# Patient Record
Sex: MALE | Race: WHITE | NOT HISPANIC OR LATINO | Employment: FULL TIME | URBAN - METROPOLITAN AREA
[De-identification: names, ages, dates, MRNs, and addresses within clinical notes are randomized per-mention and may not be internally consistent; named-entity substitution may affect disease eponyms.]

---

## 2017-03-08 ENCOUNTER — ALLSCRIPTS OFFICE VISIT (OUTPATIENT)
Dept: OTHER | Facility: OTHER | Age: 57
End: 2017-03-08

## 2017-03-08 DIAGNOSIS — E78.5 HYPERLIPIDEMIA: ICD-10-CM

## 2017-07-18 LAB
A/G RATIO (HISTORICAL): 2.1 (CALC) (ref 1–2.5)
ALBUMIN SERPL BCP-MCNC: 4.4 G/DL (ref 3.6–5.1)
ALP SERPL-CCNC: 85 U/L (ref 40–115)
ALT SERPL W P-5'-P-CCNC: 17 U/L (ref 9–46)
AST SERPL W P-5'-P-CCNC: 20 U/L (ref 10–35)
BILIRUB SERPL-MCNC: 0.7 MG/DL (ref 0.2–1.2)
BILIRUBIN DIRECT (HISTORICAL): 0.1 MG/DL
CHOLEST SERPL-MCNC: 143 MG/DL (ref 125–200)
CHOLEST/HDLC SERPL: 4.5 (CALC)
GAMMA GLOBULIN (HISTORICAL): 2.1 G/DL (CALC) (ref 1.9–3.7)
HDLC SERPL-MCNC: 32 MG/DL
INDIRECT BILIRUBIN (HISTORICAL): 0.6 MG/DL (CALC) (ref 0.2–1.2)
LDL CHOLESTEROL (HISTORICAL): 69 MG/DL (CALC)
NON-HDL-CHOL (CHOL-HDL) (HISTORICAL): 111 MG/DL (CALC)
TOTAL PROTEIN (HISTORICAL): 6.5 G/DL (ref 6.1–8.1)
TRIGL SERPL-MCNC: 212 MG/DL

## 2017-09-06 ENCOUNTER — ALLSCRIPTS OFFICE VISIT (OUTPATIENT)
Dept: OTHER | Facility: OTHER | Age: 57
End: 2017-09-06

## 2018-01-14 VITALS
HEART RATE: 56 BPM | BODY MASS INDEX: 24.91 KG/M2 | HEIGHT: 75 IN | SYSTOLIC BLOOD PRESSURE: 130 MMHG | DIASTOLIC BLOOD PRESSURE: 82 MMHG | WEIGHT: 200.38 LBS

## 2018-01-14 VITALS
HEART RATE: 52 BPM | SYSTOLIC BLOOD PRESSURE: 110 MMHG | BODY MASS INDEX: 24.62 KG/M2 | HEIGHT: 75 IN | WEIGHT: 198 LBS | RESPIRATION RATE: 16 BRPM | DIASTOLIC BLOOD PRESSURE: 68 MMHG

## 2018-03-21 ENCOUNTER — OFFICE VISIT (OUTPATIENT)
Dept: CARDIOLOGY CLINIC | Facility: CLINIC | Age: 58
End: 2018-03-21
Payer: COMMERCIAL

## 2018-03-21 VITALS
BODY MASS INDEX: 24.69 KG/M2 | HEART RATE: 56 BPM | WEIGHT: 198.6 LBS | DIASTOLIC BLOOD PRESSURE: 78 MMHG | SYSTOLIC BLOOD PRESSURE: 124 MMHG | RESPIRATION RATE: 16 BRPM | HEIGHT: 75 IN

## 2018-03-21 DIAGNOSIS — I25.10 CORONARY ARTERY DISEASE INVOLVING NATIVE CORONARY ARTERY OF NATIVE HEART WITHOUT ANGINA PECTORIS: ICD-10-CM

## 2018-03-21 DIAGNOSIS — I25.10 CORONARY ARTERY DISEASE INVOLVING NATIVE HEART WITHOUT ANGINA PECTORIS, UNSPECIFIED VESSEL OR LESION TYPE: Primary | ICD-10-CM

## 2018-03-21 DIAGNOSIS — I10 ESSENTIAL HYPERTENSION: ICD-10-CM

## 2018-03-21 DIAGNOSIS — E78.2 MIXED HYPERLIPIDEMIA: ICD-10-CM

## 2018-03-21 PROCEDURE — 99214 OFFICE O/P EST MOD 30 MIN: CPT | Performed by: INTERNAL MEDICINE

## 2018-03-21 PROCEDURE — 93000 ELECTROCARDIOGRAM COMPLETE: CPT | Performed by: INTERNAL MEDICINE

## 2018-03-21 RX ORDER — ASPIRIN 81 MG/1
81 TABLET ORAL
COMMUNITY

## 2018-03-21 NOTE — PROGRESS NOTES
Cardiology Follow Jona España  1960  [id number]  [facility]    1  Coronary artery disease involving native heart without angina pectoris, unspecified vessel or lesion type  POCT ECG    NM myocardial perfusion spect (rx stress and/or rest)   2  Coronary artery disease involving native coronary artery of native heart without angina pectoris     3  Mixed hyperlipidemia     4  Essential hypertension         Interval History: An episode on [place] Day during which she felt briefly lightheaded associated with anxiety cleared quickly  Since then he has had no issues  He denies chest pain shortness of breath orthopnea paroxysmal nocturnal dyspnea or syncope  There is no problem list on file for this patient  History reviewed  No pertinent past medical history  Social History     Social History    Marital status: /Civil Union     Spouse name: N/A    Number of children: N/A    Years of education: N/A     Occupational History    Not on file  Social History Main Topics    Smoking status: Former Smoker     Quit date: 2012    Smokeless tobacco: Never Used    Alcohol use Not on file    Drug use: Unknown    Sexual activity: Not on file     Other Topics Concern    Not on file     Social History Narrative    No narrative on file      History reviewed  No pertinent family history  History reviewed  No pertinent surgical history      Current Outpatient Prescriptions:     aspirin (ECOTRIN LOW STRENGTH) 81 mg EC tablet, Take 81 mg by mouth, Disp: , Rfl:     atorvastatin (LIPITOR) 40 mg tablet, Take 1 tablet by mouth, Disp: , Rfl:     famotidine (PEPCID) 20 mg tablet, Take 1 tablet by mouth daily, Disp: , Rfl:     metoprolol tartrate (LOPRESSOR) 25 mg tablet, 25 mg every 12 (twelve) hours  , Disp: , Rfl: 0    clindamycin (CLEOCIN) 300 MG capsule, take 1 capsule four times a day until finished, Disp: , Rfl: 0    clopidogrel (PLAVIX) 75 mg tablet, Take 1 tablet by mouth daily, Disp: , Rfl:     diclofenac sodium (VOLTAREN) 1 %, Place on the skin, Disp: , Rfl:     HYDROcodone-acetaminophen (NORCO) 5-325 mg per tablet, take 1 tablet every 4 hours if needed, Disp: , Rfl: 0  Allergies   Allergen Reactions    Penicillins Rash       Labs:  No visits with results within 6 Month(s) from this visit  Latest known visit with results is:   [employer] Office Visit on 03/08/2017   Component Date Value    Total Protein 07/18/2017 6 5     Albumin 07/18/2017 4 4     GAMMA GLOBULIN 07/18/2017 2 1     A/G RATIO 07/18/2017 2 1     Total Bilirubin 07/18/2017 0 7     BILIRUBIN DIRECT 07/18/2017 0 1     INDIRECT BILIRUBIN 07/18/2017 0 6     Alkaline Phosphatase 07/18/2017 85     AST 07/18/2017 20     ALT 07/18/2017 17     Cholesterol 07/18/2017 143     HDL 07/18/2017 32*    Triglycerides 07/18/2017 212*    LDL CHOLESTEROL 07/18/2017 69     Chol/HDL Ratio 07/18/2017 4 5     NON-HDL-CHOL (CHOL-HDL) 07/18/2017 111      Imaging: No results found  Review of Systems:  Review of Systems   Constitutional: Negative for fatigue  HENT: Negative for nosebleeds  Eyes: Negative for redness  Respiratory: Negative for chest tightness and shortness of breath  Cardiovascular: Negative for chest pain, palpitations and leg swelling  Gastrointestinal: Negative for abdominal pain  Endocrine: Negative for polyuria  Genitourinary: Negative for urgency  Musculoskeletal: Negative for arthralgias  Skin: Negative for rash  Neurological: Negative for dizziness and syncope  Psychiatric/Behavioral: Negative for confusion and sleep disturbance  The patient is not nervous/anxious  Physical Exam:  Physical Exam   Constitutional: He is oriented to person, place, and time  He appears well-developed and well-nourished  HENT:   Head: Normocephalic and atraumatic     Nose: Nose normal  Mouth/Throat: Oropharynx is clear and moist    Eyes: Pupils are equal, round, and reactive to light  Neck: Neck supple  Cardiovascular: Normal rate, regular rhythm, normal heart sounds and intact distal pulses  Pulmonary/Chest: Effort normal and breath sounds normal    Abdominal: Soft  Bowel sounds are normal    Musculoskeletal: Normal range of motion  Neurological: He is alert and oriented to person, place, and time  Skin: Skin is warm and dry  Psychiatric: He has a normal mood and affect  His behavior is normal  Judgment and thought content normal        Discussion/Summary:  Episode from [place] Day sounds like he may have had a bit of a panic attack which he has had the past   No further symptoms  We will do nuclear stress test to screen him for ischemia has he has had not had 1 since his coronary stent  Continues on high-dose statin with an LDL of less than 70  He does have a low HDL  Continues on anti-platelet medications  Blood pressure is controlled    I will call with the results of his stress test and if the stress test is not abnormal will see him again in 6 month

## 2018-04-23 ENCOUNTER — HOSPITAL ENCOUNTER (OUTPATIENT)
Dept: NON INVASIVE DIAGNOSTICS | Facility: HOSPITAL | Age: 58
Discharge: HOME | End: 2018-04-23
Attending: INTERNAL MEDICINE

## 2018-04-23 ENCOUNTER — HOSPITAL ENCOUNTER (OUTPATIENT)
Dept: NUCLEAR MEDICINE | Facility: HOSPITAL | Age: 58
Discharge: HOME | End: 2018-04-23
Attending: INTERNAL MEDICINE
Payer: COMMERCIAL

## 2018-04-23 DIAGNOSIS — I25.10 CORONARY ARTERY DISEASE INVOLVING NATIVE HEART WITHOUT ANGINA PECTORIS, UNSPECIFIED VESSEL OR LESION TYPE: ICD-10-CM

## 2018-04-23 PROCEDURE — 93017 CV STRESS TEST TRACING ONLY: CPT

## 2018-04-23 PROCEDURE — A9502 TC99M TETROFOSMIN: HCPCS

## 2018-04-23 PROCEDURE — 78452 HT MUSCLE IMAGE SPECT MULT: CPT

## 2018-04-24 LAB
CHEST PAIN STATEMENT: NORMAL
MAX DIASTOLIC BP: 135 MMHG
MAX HEART RATE: 139 BPM
MAX PREDICTED HEART RATE: 162 BPM
MAX. SYSTOLIC BP: 221 MMHG
PROTOCOL NAME: NORMAL
TARGET HR FORMULA: NORMAL
TIME IN EXERCISE PHASE: NORMAL

## 2018-06-21 ENCOUNTER — OFFICE VISIT (OUTPATIENT)
Dept: INTERNAL MEDICINE CLINIC | Facility: CLINIC | Age: 58
End: 2018-06-21
Payer: COMMERCIAL

## 2018-06-21 VITALS
TEMPERATURE: 98.7 F | BODY MASS INDEX: 23.13 KG/M2 | HEART RATE: 56 BPM | DIASTOLIC BLOOD PRESSURE: 80 MMHG | HEIGHT: 75 IN | RESPIRATION RATE: 12 BRPM | WEIGHT: 186 LBS | SYSTOLIC BLOOD PRESSURE: 122 MMHG

## 2018-06-21 DIAGNOSIS — K21.9 GASTROESOPHAGEAL REFLUX DISEASE WITHOUT ESOPHAGITIS: ICD-10-CM

## 2018-06-21 DIAGNOSIS — I10 ESSENTIAL HYPERTENSION: Primary | ICD-10-CM

## 2018-06-21 DIAGNOSIS — E78.5 HYPERLIPIDEMIA, UNSPECIFIED HYPERLIPIDEMIA TYPE: ICD-10-CM

## 2018-06-21 DIAGNOSIS — Z11.59 NEED FOR HEPATITIS C SCREENING TEST: ICD-10-CM

## 2018-06-21 DIAGNOSIS — I25.10 CORONARY ARTERY DISEASE INVOLVING NATIVE CORONARY ARTERY OF NATIVE HEART WITHOUT ANGINA PECTORIS: ICD-10-CM

## 2018-06-21 DIAGNOSIS — J30.9 ALLERGIC RHINITIS, UNSPECIFIED SEASONALITY, UNSPECIFIED TRIGGER: ICD-10-CM

## 2018-06-21 PROCEDURE — 99204 OFFICE O/P NEW MOD 45 MIN: CPT | Performed by: INTERNAL MEDICINE

## 2018-06-21 PROCEDURE — 1036F TOBACCO NON-USER: CPT | Performed by: INTERNAL MEDICINE

## 2018-06-21 PROCEDURE — 3079F DIAST BP 80-89 MM HG: CPT | Performed by: INTERNAL MEDICINE

## 2018-06-21 PROCEDURE — 3008F BODY MASS INDEX DOCD: CPT | Performed by: INTERNAL MEDICINE

## 2018-06-21 PROCEDURE — 3074F SYST BP LT 130 MM HG: CPT | Performed by: INTERNAL MEDICINE

## 2018-06-22 NOTE — PROGRESS NOTES
[facility] [place]      NAME: Coral Calix  AGE: 61 y o  SEX: male  : 1960   MRN: [id number]    DATE: 2018  TIME: 10:59 AM    Assessment and Plan   1  Essential hypertension  Well controlled  - CBC  - Comprehensive metabolic panel    2  Coronary artery disease involving native coronary artery of native heart without angina pectoris  No angina  On antiplatelet therapy and appropriate risk factor modification  3  Hyperlipidemia, unspecified hyperlipidemia type  On atorvastatin  - Lipid panel    4  Allergic rhinitis, unspecified seasonality, unspecified trigger  Continue over-the-counter antihistamines  5  Gastroesophageal reflux disease without esophagitis  Stable on famotidine  6  Need for hepatitis C screening test  - Hepatitis C Qualitative by PCR    The patient appears to be doing pretty well  He is tolerating his medications  He is maintaining a healthy lifestyle  He sees Dr Elia Cardoso every 6 months for follow-up of his coronary disease  In light of this, I suggested that he return here yearly or on an as-needed basis  Updated lab work was requested  The patient will be screened for hepatitis-C  He had a colonoscopy recently  Return to office in:  One year  Chief Complaint     Chief Complaint   Patient presents with   [address]     colonoscopy 2014       History of Present Illness     The patient is a 53-year-old man who came to the office to establish a new primary care relationship  Of late, he has been feeling generally well  Over the past several weeks he has had a lot of head congestion related to allergies  He has been using some over-the-counter antihistamines with some improvement  Otherwise, he has been feeling well with no chest pain, shortness of breath, palpitations, or dizziness  The patient's past medical history is positive for hypertension  He has a history of coronary artery disease and had a stent placed in     Things have been going well since then  He has no history of congestive heart failure, cardiac arrhythmia, stroke, COPD, type 2 diabetes, etc     The patient's only surgery was an appendectomy  Family history is positive for heart disease in both his father and mother  Social history reveals the patient is  and lives with his wife  He does not smoke nor has he ever smoked  He drinks an occasional glass of beer  He uses no illicit drugs  Review of Systems   Review of Systems   Constitutional: Negative  HENT: Positive for rhinorrhea and sinus pressure  Negative for congestion, ear pain, postnasal drip, sore throat and trouble swallowing  Eyes: Negative for pain, discharge, redness and visual disturbance  Respiratory: Negative for cough, shortness of breath and wheezing  Cardiovascular: Negative  Gastrointestinal: Negative  Endocrine: Negative  Genitourinary: Negative for difficulty urinating, dysuria, frequency, hematuria and urgency  Musculoskeletal: Negative for arthralgias, gait problem, joint swelling and myalgias  Skin: Negative for rash  Neurological: Negative for dizziness, speech difficulty, weakness, light-headedness, numbness and headaches  Hematological: Negative  Psychiatric/Behavioral: Negative for confusion, decreased concentration, dysphoric mood and sleep disturbance  The patient is not nervous/anxious  Active Problem List     Patient Active Problem List   Diagnosis    CAD (coronary artery disease), native coronary artery    Hyperlipidemia    Essential hypertension    Esophageal reflux    Osteoarthritis    Allergic rhinitis       Objective   /80 (BP Location: Left arm, Patient Position: Sitting, Cuff Size: Standard)   Pulse 56   Temp 98 7 °F (37 1 °C) (Tympanic)   Resp 12   Ht 6' 3" (1 905 m)   Wt 84 4 kg (186 lb)   BMI 23 25 kg/m²     Physical Exam   Constitutional: He is oriented to person, place, and time   He appears well-developed and well-nourished  No distress  HENT:   Head: Normocephalic and atraumatic  Right Ear: External ear normal    Left Ear: External ear normal    Mouth/Throat: Oropharynx is clear and moist    The nasal septum was deviated toward the right  Neck: Neck supple  No JVD present  No tracheal deviation present  No thyromegaly present  Cardiovascular: Normal rate, regular rhythm, normal heart sounds and intact distal pulses  Exam reveals no gallop and no friction rub  No murmur heard  Pulmonary/Chest: Effort normal and breath sounds normal  He has no wheezes  He has no rales  He exhibits no tenderness  Abdominal: Soft  Bowel sounds are normal  He exhibits no distension and no mass  There is no tenderness  There is no rebound  Musculoskeletal: Normal range of motion  He exhibits no edema or tenderness  Lymphadenopathy:     He has no cervical adenopathy  Neurological: He is alert and oriented to person, place, and time  He displays normal reflexes  No cranial nerve deficit  He exhibits normal muscle tone  Coordination normal    Skin: Skin is warm and dry  Psychiatric: He has a normal mood and affect  His behavior is normal  Judgment and thought content normal        Pertinent Laboratory/Diagnostic Studies:  Hospital Outpatient Visit on 04/23/2018   Component Date Value Ref Range Status    Protocol Name 04/23/2018 [facility]   Final    Time In Exercise Phase 04/23/2018 00:12:00   Final    MAX   SYSTOLIC BP 06/38/4475 415  mmHg Final    Max Diastolic Bp 24/82/4880 271  mmHg Final    Max Heart Rate 04/23/2018 139  BPM Final    Max Predicted Heart Rate 04/23/2018 162  BPM Final    Reason for Termination 04/23/2018    Final                    Value:Fatigue  Dyspnea  Target Heart Rate Achieved      Test Indication 04/23/2018    Final                    Value:Lightheadedness  Screening for CAD      Target Hr Formular 04/23/2018 (220 - Age)*85%   Final    Arrhy During Ex 04/23/2018    Final Value:atrial premature beats  ventricular premature beats-isolated      Chest Pain Statement 04/23/2018 none   Final           Current Medications     Current Outpatient Prescriptions:     aspirin (ECOTRIN LOW STRENGTH) 81 mg EC tablet, Take 81 mg by mouth, Disp: , Rfl:     atorvastatin (LIPITOR) 40 mg tablet, Take 1 tablet by mouth, Disp: , Rfl:     metoprolol tartrate (LOPRESSOR) 25 mg tablet, 25 mg every 12 (twelve) hours  , Disp: , Rfl: 0    famotidine (PEPCID) 20 mg tablet, Take 1 tablet by mouth daily, Disp: , Rfl:       Marcello Garay MD

## 2018-09-29 DIAGNOSIS — E78.00 HYPERCHOLESTEREMIA: Primary | ICD-10-CM

## 2018-10-01 RX ORDER — ATORVASTATIN CALCIUM 40 MG/1
TABLET, FILM COATED ORAL
Qty: 90 TABLET | Refills: 3 | Status: SHIPPED | OUTPATIENT
Start: 2018-10-01 | End: 2019-12-18 | Stop reason: SDUPTHER

## 2018-12-29 DIAGNOSIS — I10 HTN (HYPERTENSION), BENIGN: Primary | ICD-10-CM

## 2018-12-29 DIAGNOSIS — I25.10 CORONARY ARTERY DISEASE INVOLVING NATIVE CORONARY ARTERY OF NATIVE HEART WITHOUT ANGINA PECTORIS: ICD-10-CM

## 2018-12-29 NOTE — PROGRESS NOTES
Patient's wife called, requesting refill for metoprolol  Patient's wife states patient has not been taking for 1-2 weeks as he ran out of refills and has not been able to get in touch with anyone in the office  Patient feeling well without symptoms  She is wondering if he needs this long-term  I have advised she discuss this with their primary cardiologist   She is agreeable

## 2018-12-31 NOTE — PROGRESS NOTES
Phone call to patient Monday morning asking patient what phone number he was calling to get a refill on his Metoprolol  He states he never called our office, that Jose sheldon was supposed to, and didn't  He states" it was [employer] at fault " I explained we have a prescription line that gets checked multiple times daily, and that he should use that line for any refills he needs, instead of depending on the drug store to take care of everything  I think they probably used the [phone] new fax and it got lost in the big black hole that everything else does, and we never received the fax yet  Patient agreed to use our prescription line in the future

## 2019-04-11 DIAGNOSIS — I10 HTN (HYPERTENSION), BENIGN: ICD-10-CM

## 2019-04-11 DIAGNOSIS — I25.10 CORONARY ARTERY DISEASE INVOLVING NATIVE CORONARY ARTERY OF NATIVE HEART WITHOUT ANGINA PECTORIS: ICD-10-CM

## 2019-10-28 DIAGNOSIS — E78.2 MIXED HYPERLIPIDEMIA: ICD-10-CM

## 2019-10-28 DIAGNOSIS — I25.10 CORONARY ARTERY DISEASE INVOLVING NATIVE HEART WITHOUT ANGINA PECTORIS, UNSPECIFIED VESSEL OR LESION TYPE: Primary | ICD-10-CM

## 2019-12-18 ENCOUNTER — OFFICE VISIT (OUTPATIENT)
Dept: CARDIOLOGY CLINIC | Facility: CLINIC | Age: 59
End: 2019-12-18
Payer: COMMERCIAL

## 2019-12-18 VITALS
SYSTOLIC BLOOD PRESSURE: 124 MMHG | DIASTOLIC BLOOD PRESSURE: 72 MMHG | HEART RATE: 50 BPM | HEIGHT: 75 IN | WEIGHT: 190.4 LBS | BODY MASS INDEX: 23.67 KG/M2

## 2019-12-18 DIAGNOSIS — I10 HTN (HYPERTENSION), BENIGN: ICD-10-CM

## 2019-12-18 DIAGNOSIS — I25.10 CORONARY ARTERY DISEASE INVOLVING NATIVE HEART, ANGINA PRESENCE UNSPECIFIED, UNSPECIFIED VESSEL OR LESION TYPE: Primary | ICD-10-CM

## 2019-12-18 DIAGNOSIS — I10 ESSENTIAL HYPERTENSION: ICD-10-CM

## 2019-12-18 DIAGNOSIS — E78.2 MIXED HYPERLIPIDEMIA: ICD-10-CM

## 2019-12-18 DIAGNOSIS — I25.10 CORONARY ARTERY DISEASE INVOLVING NATIVE CORONARY ARTERY OF NATIVE HEART WITHOUT ANGINA PECTORIS: ICD-10-CM

## 2019-12-18 DIAGNOSIS — E78.00 HYPERCHOLESTEREMIA: ICD-10-CM

## 2019-12-18 PROCEDURE — 99214 OFFICE O/P EST MOD 30 MIN: CPT | Performed by: INTERNAL MEDICINE

## 2019-12-18 PROCEDURE — 3078F DIAST BP <80 MM HG: CPT | Performed by: INTERNAL MEDICINE

## 2019-12-18 PROCEDURE — 3074F SYST BP LT 130 MM HG: CPT | Performed by: INTERNAL MEDICINE

## 2019-12-18 PROCEDURE — 93000 ELECTROCARDIOGRAM COMPLETE: CPT | Performed by: INTERNAL MEDICINE

## 2019-12-18 RX ORDER — ATORVASTATIN CALCIUM 40 MG/1
40 TABLET, FILM COATED ORAL
Qty: 90 TABLET | Refills: 3 | Status: SHIPPED | OUTPATIENT
Start: 2019-12-18 | End: 2021-06-17 | Stop reason: SDUPTHER

## 2019-12-18 NOTE — PROGRESS NOTES
Cardiology Follow Up    Zina Mullins  1960  [id number]  [address] [facility] [id number] [phone]    1  Coronary artery disease involving native heart, angina presence unspecified, unspecified vessel or lesion type  POCT ECG    Basic metabolic panel    Hepatic function panel    Lipid panel    CBC and differential   2  Coronary artery disease involving native coronary artery of native heart without angina pectoris     3  Essential hypertension     4  Mixed hyperlipidemia         Interval History:  No complaints  Tolerates all activity  Denies chest pain shortness of breath orthopnea paroxysmal nocturnal dyspnea or syncope  Patient Active Problem List   Diagnosis    CAD (coronary artery disease), native coronary artery    Hyperlipidemia    Essential hypertension    Esophageal reflux    Osteoarthritis    Allergic rhinitis     History reviewed  No pertinent past medical history    Social History     Socioeconomic History    Marital status: /Civil Union     Spouse name: Not on file    Number of children: Not on file    Years of education: Not on file    Highest education level: Not on file   Occupational History    Not on file   Social Needs    Financial resource strain: Not on file    Food insecurity:     Worry: Not on file     Inability: Not on file    Transportation needs:     Medical: Not on file     Non-medical: Not on file   Tobacco Use    Smoking status: Former Smoker     Last attempt to quit: 2012     Years since quittin 9    Smokeless tobacco: Never Used   Substance and Sexual Activity    Alcohol use: Not on file    Drug use: Not on file    Sexual activity: Not on file   Lifestyle    Physical activity:     Days per week: Not on file     Minutes per session: Not on file    Stress: Not on file   Relationships    Social connections:     Talks on phone: Not on file     Gets together: Not on file     Attends [employer] service: Not on file     Active member of club or organization: Not on file     Attends meetings of clubs or organizations: Not on file     Relationship status: Not on file    Intimate partner violence:     Fear of current or ex partner: Not on file     Emotionally abused: Not on file     Physically abused: Not on file     Forced sexual activity: Not on file   Other Topics Concern    Not on file   Social History Narrative    Not on file      Family History   Problem Relation Age of Onset    Heart disease Mother     Heart disease Father      Past Surgical History:   Procedure Laterality Date    APPENDECTOMY      CORONARY ANGIOPLASTY WITH STENT PLACEMENT      HERNIA REPAIR         Current Outpatient Medications:     aspirin (ECOTRIN LOW STRENGTH) 81 mg EC tablet, Take 81 mg by mouth, Disp: , Rfl:     atorvastatin (LIPITOR) 40 mg tablet, TAKE 1 TABLET AT BEDTIME , Disp: 90 tablet, Rfl: 3    metoprolol tartrate (LOPRESSOR) 25 mg tablet, Take 1 tablet (25 mg total) by mouth every 12 (twelve) hours, Disp: 60 tablet, Rfl: 5    famotidine (PEPCID) 20 mg tablet, Take 1 tablet by mouth daily, Disp: , Rfl:   Allergies   Allergen Reactions    Penicillins Rash       Labs:  No visits with results within 2 Month(s) from this visit  Latest known visit with results is:   Hospital Outpatient Visit on 04/23/2018   Component Date Value    Protocol Name 04/23/2018 Trini Diego Time In Exercise Phase 04/23/2018 00:12:00     MAX   SYSTOLIC BP 63/58/3466 004     Max Diastolic Bp 49/39/3922 743     Max Heart Rate 04/23/2018 139     Max Predicted Heart Rate 04/23/2018 162     Reason for Termination 04/23/2018                      Value:Fatigue  Dyspnea  Target Heart Rate Achieved      Test Indication 04/23/2018                      Value:Lightheadedness  Screening for CAD      Target Hr Formular 04/23/2018 (220 - Age)*85%     Arrhy During Ex 04/23/2018 Value:atrial premature beats  ventricular premature beats-isolated      Chest Pain Statement 04/23/2018 none      Imaging: No results found  Review of Systems:  Review of Systems   Constitutional: Negative for fatigue  HENT: Negative for nosebleeds  Eyes: Negative for redness  Respiratory: Negative for chest tightness and shortness of breath  Cardiovascular: Negative for chest pain, palpitations and leg swelling  Gastrointestinal: Negative for abdominal pain  Endocrine: Negative for polyuria  Genitourinary: Negative for urgency  Musculoskeletal: Negative for arthralgias  Skin: Negative for rash  Neurological: Negative for dizziness and syncope  Psychiatric/Behavioral: Negative for confusion and sleep disturbance  The patient is not nervous/anxious  Physical Exam:  Physical Exam   Constitutional: He is oriented to person, place, and time  He appears well-developed and well-nourished  HENT:   Head: Normocephalic and atraumatic  Nose: Nose normal    Mouth/Throat: Oropharynx is clear and moist    Eyes: Pupils are equal, round, and reactive to light  Neck: Neck supple  Cardiovascular: Normal rate, regular rhythm, normal heart sounds and intact distal pulses  Pulmonary/Chest: Effort normal and breath sounds normal    Abdominal: Soft  Bowel sounds are normal    Musculoskeletal: Normal range of motion  Neurological: He is alert and oriented to person, place, and time  Skin: Skin is warm and dry  Psychiatric: He has a normal mood and affect  His behavior is normal  Judgment and thought content normal        Discussion/Summary:  History of a coronary stent in 2012  He quit smoking at that time  No cardiovascular issues since  I have not seen him since March of 2018  In April of 2018 he had a negative stress test   He is currently asymptomatic in functional class 1  EKG shows sinus bradycardia 50 otherwise unremarkable    Blood pressure is normal   He has not had any recent lab work and I will order that specifically to assess his cholesterol  I will renew his medications  I will see him again in 6 months

## 2020-01-13 PROCEDURE — 88305 TISSUE EXAM BY PATHOLOGIST: CPT | Performed by: PATHOLOGY

## 2020-01-14 ENCOUNTER — LAB REQUISITION (OUTPATIENT)
Dept: LAB | Facility: HOSPITAL | Age: 60
End: 2020-01-14
Payer: COMMERCIAL

## 2020-01-14 DIAGNOSIS — K63.5 POLYP OF COLON: ICD-10-CM

## 2020-01-14 DIAGNOSIS — Z12.11 ENCOUNTER FOR SCREENING FOR MALIGNANT NEOPLASM OF COLON: ICD-10-CM

## 2020-05-21 ENCOUNTER — APPOINTMENT (OUTPATIENT)
Dept: LAB | Facility: CLINIC | Age: 60
End: 2020-05-21
Payer: COMMERCIAL

## 2020-05-21 LAB
ALBUMIN SERPL BCP-MCNC: 3.9 G/DL (ref 3.5–5)
ALP SERPL-CCNC: 73 U/L (ref 46–116)
ALT SERPL W P-5'-P-CCNC: 32 U/L (ref 12–78)
ANION GAP SERPL CALCULATED.3IONS-SCNC: 4 MMOL/L (ref 4–13)
AST SERPL W P-5'-P-CCNC: 18 U/L (ref 5–45)
BASOPHILS # BLD AUTO: 0.04 THOUSANDS/ΜL (ref 0–0.1)
BASOPHILS NFR BLD AUTO: 1 % (ref 0–1)
BILIRUB DIRECT SERPL-MCNC: 0.12 MG/DL (ref 0–0.2)
BILIRUB SERPL-MCNC: 0.44 MG/DL (ref 0.2–1)
BUN SERPL-MCNC: 13 MG/DL (ref 5–25)
CALCIUM SERPL-MCNC: 9.6 MG/DL (ref 8.3–10.1)
CHLORIDE SERPL-SCNC: 109 MMOL/L (ref 100–108)
CHOLEST SERPL-MCNC: 119 MG/DL (ref 50–200)
CO2 SERPL-SCNC: 28 MMOL/L (ref 21–32)
CREAT SERPL-MCNC: 1.02 MG/DL (ref 0.6–1.3)
EOSINOPHIL # BLD AUTO: 0.15 THOUSAND/ΜL (ref 0–0.61)
EOSINOPHIL NFR BLD AUTO: 3 % (ref 0–6)
ERYTHROCYTE [DISTWIDTH] IN BLOOD BY AUTOMATED COUNT: 12.4 % (ref 11.6–15.1)
GFR SERPL CREATININE-BSD FRML MDRD: 80 ML/MIN/1.73SQ M
GLUCOSE P FAST SERPL-MCNC: 84 MG/DL (ref 65–99)
HCT VFR BLD AUTO: 43.1 % (ref 36.5–49.3)
HDLC SERPL-MCNC: 33 MG/DL
HGB BLD-MCNC: 15 G/DL (ref 12–17)
IMM GRANULOCYTES # BLD AUTO: 0.02 THOUSAND/UL (ref 0–0.2)
IMM GRANULOCYTES NFR BLD AUTO: 0 % (ref 0–2)
LDLC SERPL CALC-MCNC: 65 MG/DL (ref 0–100)
LYMPHOCYTES # BLD AUTO: 1.91 THOUSANDS/ΜL (ref 0.6–4.47)
LYMPHOCYTES NFR BLD AUTO: 31 % (ref 14–44)
MCH RBC QN AUTO: 32.3 PG (ref 26.8–34.3)
MCHC RBC AUTO-ENTMCNC: 34.8 G/DL (ref 31.4–37.4)
MCV RBC AUTO: 93 FL (ref 82–98)
MONOCYTES # BLD AUTO: 0.61 THOUSAND/ΜL (ref 0.17–1.22)
MONOCYTES NFR BLD AUTO: 10 % (ref 4–12)
NEUTROPHILS # BLD AUTO: 3.37 THOUSANDS/ΜL (ref 1.85–7.62)
NEUTS SEG NFR BLD AUTO: 55 % (ref 43–75)
NONHDLC SERPL-MCNC: 86 MG/DL
NRBC BLD AUTO-RTO: 0 /100 WBCS
PLATELET # BLD AUTO: 261 THOUSANDS/UL (ref 149–390)
PMV BLD AUTO: 10.5 FL (ref 8.9–12.7)
POTASSIUM SERPL-SCNC: 4 MMOL/L (ref 3.5–5.3)
PROT SERPL-MCNC: 7.1 G/DL (ref 6.4–8.2)
RBC # BLD AUTO: 4.65 MILLION/UL (ref 3.88–5.62)
SODIUM SERPL-SCNC: 141 MMOL/L (ref 136–145)
TRIGL SERPL-MCNC: 105 MG/DL
WBC # BLD AUTO: 6.1 THOUSAND/UL (ref 4.31–10.16)

## 2020-05-21 PROCEDURE — 36415 COLL VENOUS BLD VENIPUNCTURE: CPT | Performed by: INTERNAL MEDICINE

## 2020-05-21 PROCEDURE — 80053 COMPREHEN METABOLIC PANEL: CPT | Performed by: INTERNAL MEDICINE

## 2020-05-21 PROCEDURE — 85025 COMPLETE CBC W/AUTO DIFF WBC: CPT

## 2020-05-21 PROCEDURE — 80061 LIPID PANEL: CPT | Performed by: INTERNAL MEDICINE

## 2020-05-21 PROCEDURE — 82248 BILIRUBIN DIRECT: CPT | Performed by: INTERNAL MEDICINE

## 2021-03-30 DIAGNOSIS — Z23 ENCOUNTER FOR IMMUNIZATION: ICD-10-CM

## 2021-04-15 ENCOUNTER — IMMUNIZATIONS (OUTPATIENT)
Dept: FAMILY MEDICINE CLINIC | Facility: HOSPITAL | Age: 61
End: 2021-04-15

## 2021-04-15 DIAGNOSIS — Z23 ENCOUNTER FOR IMMUNIZATION: Primary | ICD-10-CM

## 2021-04-15 PROCEDURE — 91301 SARS-COV-2 / COVID-19 MRNA VACCINE (MODERNA) 100 MCG: CPT

## 2021-04-15 PROCEDURE — 0011A SARS-COV-2 / COVID-19 MRNA VACCINE (MODERNA) 100 MCG: CPT

## 2021-05-19 ENCOUNTER — IMMUNIZATIONS (OUTPATIENT)
Dept: FAMILY MEDICINE CLINIC | Facility: HOSPITAL | Age: 61
End: 2021-05-19

## 2021-05-19 DIAGNOSIS — Z23 ENCOUNTER FOR IMMUNIZATION: Primary | ICD-10-CM

## 2021-05-19 PROCEDURE — 0012A SARS-COV-2 / COVID-19 MRNA VACCINE (MODERNA) 100 MCG: CPT

## 2021-05-19 PROCEDURE — 91301 SARS-COV-2 / COVID-19 MRNA VACCINE (MODERNA) 100 MCG: CPT

## 2021-06-02 ENCOUNTER — TELEPHONE (OUTPATIENT)
Dept: CARDIOLOGY CLINIC | Facility: CLINIC | Age: 61
End: 2021-06-02

## 2021-06-02 NOTE — TELEPHONE ENCOUNTER
Wife called upset found out that patient has been out of medication since December called for refills and were refused because has not been here since 12/2019 / his appt has been rescheduled due to MD schedule  delaying the rx refill, pt was last seen 12/2019  pt is seeing Dr Feliz Cedeno sooner than Dr Angela Gray now rescheduled to July with Dr Angela Gray  Advised wife that if Dr Feliz Cedeno examines and feels patient should be on medications he can prescribe the same  Will put message into Dr Angela Gray so he is aware of situation

## 2021-06-17 ENCOUNTER — OFFICE VISIT (OUTPATIENT)
Dept: INTERNAL MEDICINE CLINIC | Facility: CLINIC | Age: 61
End: 2021-06-17
Payer: COMMERCIAL

## 2021-06-17 VITALS
RESPIRATION RATE: 14 BRPM | SYSTOLIC BLOOD PRESSURE: 130 MMHG | WEIGHT: 173.8 LBS | TEMPERATURE: 97.4 F | DIASTOLIC BLOOD PRESSURE: 80 MMHG | HEIGHT: 75 IN | BODY MASS INDEX: 21.61 KG/M2 | HEART RATE: 70 BPM

## 2021-06-17 DIAGNOSIS — Z12.5 SCREENING FOR PROSTATE CANCER: ICD-10-CM

## 2021-06-17 DIAGNOSIS — E78.00 HYPERCHOLESTEREMIA: ICD-10-CM

## 2021-06-17 DIAGNOSIS — E78.2 MIXED HYPERLIPIDEMIA: ICD-10-CM

## 2021-06-17 DIAGNOSIS — K21.9 GASTROESOPHAGEAL REFLUX DISEASE WITHOUT ESOPHAGITIS: ICD-10-CM

## 2021-06-17 DIAGNOSIS — I10 ESSENTIAL HYPERTENSION: ICD-10-CM

## 2021-06-17 DIAGNOSIS — I25.10 CORONARY ARTERY DISEASE INVOLVING NATIVE CORONARY ARTERY OF NATIVE HEART WITHOUT ANGINA PECTORIS: Primary | ICD-10-CM

## 2021-06-17 DIAGNOSIS — Z11.59 NEED FOR HEPATITIS C SCREENING TEST: ICD-10-CM

## 2021-06-17 DIAGNOSIS — Z11.4 SCREENING FOR HIV (HUMAN IMMUNODEFICIENCY VIRUS): ICD-10-CM

## 2021-06-17 PROCEDURE — 3725F SCREEN DEPRESSION PERFORMED: CPT | Performed by: INTERNAL MEDICINE

## 2021-06-17 PROCEDURE — 99396 PREV VISIT EST AGE 40-64: CPT | Performed by: INTERNAL MEDICINE

## 2021-06-17 PROCEDURE — 1036F TOBACCO NON-USER: CPT | Performed by: INTERNAL MEDICINE

## 2021-06-17 PROCEDURE — 3008F BODY MASS INDEX DOCD: CPT | Performed by: INTERNAL MEDICINE

## 2021-06-17 RX ORDER — ATORVASTATIN CALCIUM 40 MG/1
40 TABLET, FILM COATED ORAL
Qty: 90 TABLET | Refills: 3 | Status: SHIPPED | OUTPATIENT
Start: 2021-06-17

## 2021-06-17 NOTE — PROGRESS NOTES
[facility] Internal Medicine [place]      NAME: Adina Fields  AGE: 63 y o  SEX: male  : 1960   MRN: [id number]    DATE: 2021  TIME: 5:04 PM    Assessment and Plan   1  Coronary artery disease involving native coronary artery of native heart without angina pectoris    No angina  On aspirin  I asked the patient to resume atorvastatin  2  Essential hypertension    Adequately controlled without medication   - Comprehensive metabolic panel  - CBC and differential    3  Gastroesophageal reflux disease without esophagitis    This has responded to dietary manipulation  The patient is not on medication at present  4  Hypercholesteremia    Resume  atorvastatin  - atorvastatin (LIPITOR) 40 mg tablet; Take 1 tablet (40 mg total) by mouth daily at bedtime  Dispense: 90 tablet; Refill: 3  - Lipid panel    5  Need for hepatitis C screening test  - Hepatitis C antibody; Future    6  Screening for HIV (human immunodeficiency virus)  - HIV 1/2 Antigen/Antibody (4th Generation) w Reflex [facility]; Future    7  Screening for prostate cancer  - PSA, Total Screen; Future     the patient is doing generally well  He is maintaining a healthy lifestyle  He does not smoke, drink, use illicit drugs  He exercises regularly  He watches his diet and he is at his ideal body weight  He is up-to-date with influenza and COVID vaccination  I discussed tetanus booster and herpes zoster vaccination with him  He is up-to-date with his colonoscopy  I have ordered studies to screen for hepatitis-C, HIV, and prostate cancer  The patient will be re-evaluated by Dr Humberto Saini of Cardiology in the near future        Return to office in:   One year    Chief Complaint     Chief Complaint   Patient presents with    Annual Exam       History of Present Illness      the patient returned to the office for yearly re-evaluation and for reassessment of his medical problems which include coronary artery disease, hypertension, and hypercholesterolemia  Since his last visit he has been feeling quite well  He has had no chest pain, shortness of breath, palpitations, or dizziness  He says that he has been working from home  He has an office on the 2nd floor on lab in his basement  As result he says he walks up and down steps many times per day  This causes him no untoward symptoms  The patient is currently on aspirin but has run out of his other medications  He says that when he was on metoprolol he felt quite sluggish after he took his morning dose and usually would not take it in the afternoon because of the severity of this side effect  The following portions of the patient's history were reviewed and updated as appropriate: allergies, current medications, past family history, past medical history, past social history, past surgical history and problem list     Review of Systems   Review of Systems   Constitutional: Negative  HENT: Negative for congestion, ear pain, postnasal drip, rhinorrhea, sore throat and trouble swallowing  Eyes: Negative for pain, discharge, redness and visual disturbance  Respiratory: Negative for cough, shortness of breath and wheezing  Cardiovascular: Negative  Gastrointestinal: Negative  Endocrine: Negative  Genitourinary: Negative for difficulty urinating, dysuria, frequency, hematuria and urgency  Musculoskeletal: Negative for arthralgias, gait problem, joint swelling and myalgias  Skin: Negative for rash  Neurological: Negative for dizziness, speech difficulty, weakness, light-headedness, numbness and headaches  Hematological: Negative  Psychiatric/Behavioral: Negative for confusion, decreased concentration, dysphoric mood and sleep disturbance  The patient is not nervous/anxious          Active Problem List     Patient Active Problem List   Diagnosis    CAD (coronary artery disease), native coronary artery    Hyperlipidemia    Essential hypertension    Esophageal reflux    Osteoarthritis    Allergic rhinitis       Objective   /80 (BP Location: Left arm, Patient Position: Sitting, Cuff Size: Standard)   Pulse 70   Temp (!) 97 4 °F (36 3 °C)   Resp 14   Ht 6' 3" (1 905 m)   Wt 78 8 kg (173 lb 12 8 oz)   BMI 21 72 kg/m²     Physical Exam  Constitutional:       General: He is not in acute distress  Appearance: Normal appearance  He is well-developed  HENT:      Head: Normocephalic and atraumatic  Right Ear: Tympanic membrane, ear canal and external ear normal       Left Ear: Tympanic membrane, ear canal and external ear normal       Nose: Nose normal       Mouth/Throat:      Mouth: Mucous membranes are moist       Pharynx: Oropharynx is clear  Eyes:      Extraocular Movements: Extraocular movements intact  Conjunctiva/sclera: Conjunctivae normal       Pupils: Pupils are equal, round, and reactive to light  Neck:      Thyroid: No thyromegaly  Vascular: No JVD  Trachea: No tracheal deviation  Cardiovascular:      Rate and Rhythm: Normal rate and regular rhythm  Heart sounds: Normal heart sounds  No murmur heard  No friction rub  No gallop  Pulmonary:      Effort: Pulmonary effort is normal       Breath sounds: Normal breath sounds  No wheezing or rales  Chest:      Chest wall: No tenderness  Abdominal:      General: Bowel sounds are normal  There is no distension  Palpations: Abdomen is soft  There is no mass  Tenderness: There is no abdominal tenderness  There is no rebound  Musculoskeletal:         General: No tenderness  Normal range of motion  Cervical back: Neck supple  Lymphadenopathy:      Cervical: No cervical adenopathy  Skin:     General: Skin is warm and dry  Neurological:      Mental Status: He is alert and oriented to person, place, and time  Psychiatric:         Mood and Affect: Mood normal          Behavior: Behavior normal          Thought Content:  Thought content normal  Judgment: Judgment normal                Current Medications     Current Outpatient Medications:     aspirin (ECOTRIN LOW STRENGTH) 81 mg EC tablet, Take 81 mg by mouth, Disp: , Rfl:     atorvastatin (LIPITOR) 40 mg tablet, Take 1 tablet (40 mg total) by mouth daily at bedtime, Disp: 90 tablet, Rfl: 3    metoprolol tartrate (LOPRESSOR) 25 mg tablet, Take 1 tablet (25 mg total) by mouth every 12 (twelve) hours (Patient not taking: Reported on 6/17/2021), Disp: 60 tablet, Rfl: 5      Humberto Delvalle MD

## 2021-07-28 ENCOUNTER — OFFICE VISIT (OUTPATIENT)
Dept: CARDIOLOGY CLINIC | Facility: CLINIC | Age: 61
End: 2021-07-28
Payer: COMMERCIAL

## 2021-07-28 VITALS
WEIGHT: 179.2 LBS | BODY MASS INDEX: 22.4 KG/M2 | DIASTOLIC BLOOD PRESSURE: 80 MMHG | SYSTOLIC BLOOD PRESSURE: 162 MMHG | HEART RATE: 56 BPM

## 2021-07-28 DIAGNOSIS — I10 ESSENTIAL HYPERTENSION: ICD-10-CM

## 2021-07-28 DIAGNOSIS — I10 HYPERTENSION, UNSPECIFIED TYPE: Primary | ICD-10-CM

## 2021-07-28 DIAGNOSIS — I25.10 CORONARY ARTERY DISEASE INVOLVING NATIVE CORONARY ARTERY OF NATIVE HEART WITHOUT ANGINA PECTORIS: ICD-10-CM

## 2021-07-28 DIAGNOSIS — E78.2 MIXED HYPERLIPIDEMIA: ICD-10-CM

## 2021-07-28 PROCEDURE — 3077F SYST BP >= 140 MM HG: CPT | Performed by: INTERNAL MEDICINE

## 2021-07-28 PROCEDURE — 1036F TOBACCO NON-USER: CPT | Performed by: INTERNAL MEDICINE

## 2021-07-28 PROCEDURE — 3079F DIAST BP 80-89 MM HG: CPT | Performed by: INTERNAL MEDICINE

## 2021-07-28 PROCEDURE — 93000 ELECTROCARDIOGRAM COMPLETE: CPT | Performed by: INTERNAL MEDICINE

## 2021-07-28 PROCEDURE — 99214 OFFICE O/P EST MOD 30 MIN: CPT | Performed by: INTERNAL MEDICINE

## 2021-07-28 RX ORDER — METOPROLOL SUCCINATE 25 MG/1
25 TABLET, EXTENDED RELEASE ORAL DAILY
Qty: 90 TABLET | Refills: 3 | Status: SHIPPED | OUTPATIENT
Start: 2021-07-28

## 2021-07-28 NOTE — PROGRESS NOTES
Cardiology Follow Up    Becka Gore  1960  [id number]  [address] [facility] [address] [phone]    1  Hypertension, unspecified type  POCT ECG    metoprolol succinate (TOPROL-XL) 25 mg 24 hr tablet   2  Essential hypertension     3  Coronary artery disease involving native coronary artery of native heart without angina pectoris     4  Mixed hyperlipidemia         Interval History:  No complaints  Denies chest pain or shortness of breath with activity  Denies orthopnea paroxysmal nocturnal dyspnea palpitations or syncope    Patient Active Problem List   Diagnosis    CAD (coronary artery disease), native coronary artery    Hyperlipidemia    Essential hypertension    Esophageal reflux    Osteoarthritis    Allergic rhinitis     No past medical history on file  Social History     Socioeconomic History    Marital status: /Civil Union     Spouse name: Not on file    Number of children: Not on file    Years of education: Not on file    Highest education level: Not on file   Occupational History    Not on file   Tobacco Use    Smoking status: Former Smoker     Quit date:      Years since quittin 5    Smokeless tobacco: Never Used   Substance and Sexual Activity    Alcohol use: Not on file    Drug use: Not on file    Sexual activity: Not on file   Other Topics Concern    Not on file   Social History Narrative    Not on file     Social Determinants of Health     Financial Resource Strain:     Difficulty of Paying Living Expenses:    Food Insecurity:     Worried About [facility] in the Last Year:     [facility] in the Last Year:    Transportation Needs:     Lack of Transportation (Medical):      Lack of Transportation (Non-Medical):    Physical Activity:     Days of Exercise per Week:     Minutes of Exercise per Session:    Stress:     Feeling of Stress : Social Connections:     Frequency of Communication with Friends and Family:     Frequency of Social Gatherings with Friends and Family:     Attends [employer] Services:     Active Member of Clubs or Organizations:     Attends Club or Organization Meetings:     Marital Status:    Intimate Partner Violence:     Fear of Current or Ex-Partner:     Emotionally Abused:     Physically Abused:     Sexually Abused:       Family History   Problem Relation Age of Onset    Heart disease Mother     Heart disease Father      Past Surgical History:   Procedure Laterality Date    APPENDECTOMY      CORONARY ANGIOPLASTY WITH STENT PLACEMENT      HERNIA REPAIR         Current Outpatient Medications:     aspirin (ECOTRIN LOW STRENGTH) 81 mg EC tablet, Take 81 mg by mouth, Disp: , Rfl:     atorvastatin (LIPITOR) 40 mg tablet, Take 1 tablet (40 mg total) by mouth daily at bedtime, Disp: 90 tablet, Rfl: 3    metoprolol succinate (TOPROL-XL) 25 mg 24 hr tablet, Take 1 tablet (25 mg total) by mouth daily, Disp: 90 tablet, Rfl: 3  Allergies   Allergen Reactions    Penicillins Rash       Labs:  No visits with results within 2 Month(s) from this visit     Latest known visit with results is:   Lab Requisition on 01/13/2020   Component Date Value    Case Report 01/13/2020                      Value:Surgical Pathology Report                         Case: [id number]                                   Authorizing Provider:  Yasmin Tan MD        Collected:           01/13/2020                   Ordering Location:     [address] [facility]      Received:            01/14/2020 [address] [facility]                                                                                  Laboratory                                                                   Pathologist:           Georgi Colindres MD                                                            Specimen:    Colon, sigmoid  Final Diagnosis 01/13/2020                      Value: This result contains rich text formatting which cannot be displayed here   Additional Information 01/13/2020                      Value: This result contains rich text formatting which cannot be displayed here  [employer] Description 01/13/2020                      Value: This result contains rich text formatting which cannot be displayed here  Imaging: No results found  Review of Systems:  Review of Systems   Constitutional: Negative for fatigue  HENT: Negative for nosebleeds  Eyes: Negative for redness  Respiratory: Negative for chest tightness and shortness of breath  Cardiovascular: Negative for chest pain, palpitations and leg swelling  Gastrointestinal: Negative for abdominal pain  Endocrine: Negative for polyuria  Genitourinary: Negative for urgency  Musculoskeletal: Negative for arthralgias  Skin: Negative for rash  Neurological: Negative for dizziness and syncope  Psychiatric/Behavioral: Negative for confusion and sleep disturbance  The patient is not nervous/anxious  Physical Exam:  Physical Exam  Constitutional:       Appearance: He is well-developed  HENT:      Head: Normocephalic and atraumatic  Nose: Nose normal    Eyes:      Pupils: Pupils are equal, round, and reactive to light  Cardiovascular:      Rate and Rhythm: Normal rate and regular rhythm  Heart sounds: Normal heart sounds  Pulmonary:      Effort: Pulmonary effort is normal       Breath sounds: Normal breath sounds  Abdominal:      General: Bowel sounds are normal       Palpations: Abdomen is soft  Musculoskeletal:         General: Normal range of motion  Cervical back: Neck supple  Skin:     General: Skin is warm and dry  Neurological:      Mental Status: He is alert and oriented to person, place, and time     Psychiatric:         Behavior: Behavior normal  Thought Content: Thought content normal          Judgment: Judgment normal          Discussion/Summary:  Asymptomatic in functional class 1  Remote history of stent placement  He is on aspirin and high-dose statin  Most recent LDL was 65  He has a slip to have a fasting lipid profile done  A blood pressure elevated in the office today  He told me it typically runs normal at home  He had been maintained on metoprolol 25 mg twice daily although he was only taking it once daily  I have given him a 25 mg tablet to take and we will reassess his blood pressure  He will notify it is it is over 140/90  I will see him again in 6 months

## 2021-09-17 DIAGNOSIS — Z20.822 CLOSE EXPOSURE TO COVID-19 VIRUS: Primary | ICD-10-CM

## 2021-09-17 PROCEDURE — U0003 INFECTIOUS AGENT DETECTION BY NUCLEIC ACID (DNA OR RNA); SEVERE ACUTE RESPIRATORY SYNDROME CORONAVIRUS 2 (SARS-COV-2) (CORONAVIRUS DISEASE [COVID-19]), AMPLIFIED PROBE TECHNIQUE, MAKING USE OF HIGH THROUGHPUT TECHNOLOGIES AS DESCRIBED BY CMS-2020-01-R: HCPCS | Performed by: INTERNAL MEDICINE

## 2021-09-17 PROCEDURE — U0005 INFEC AGEN DETEC AMPLI PROBE: HCPCS | Performed by: INTERNAL MEDICINE

## 2022-05-20 ENCOUNTER — APPOINTMENT (OUTPATIENT)
Dept: LAB | Facility: CLINIC | Age: 62
End: 2022-05-20
Payer: COMMERCIAL

## 2022-05-20 DIAGNOSIS — Z12.5 SCREENING FOR PROSTATE CANCER: ICD-10-CM

## 2022-05-20 DIAGNOSIS — Z11.4 SCREENING FOR HIV (HUMAN IMMUNODEFICIENCY VIRUS): ICD-10-CM

## 2022-05-20 DIAGNOSIS — Z11.59 NEED FOR HEPATITIS C SCREENING TEST: ICD-10-CM

## 2022-05-20 LAB
ALBUMIN SERPL BCP-MCNC: 4.1 G/DL (ref 3.5–5)
ALP SERPL-CCNC: 87 U/L (ref 46–116)
ALT SERPL W P-5'-P-CCNC: 27 U/L (ref 12–78)
ANION GAP SERPL CALCULATED.3IONS-SCNC: 4 MMOL/L (ref 4–13)
AST SERPL W P-5'-P-CCNC: 23 U/L (ref 5–45)
BASOPHILS # BLD AUTO: 0.03 THOUSANDS/ΜL (ref 0–0.1)
BASOPHILS NFR BLD AUTO: 0 % (ref 0–1)
BILIRUB SERPL-MCNC: 0.71 MG/DL (ref 0.2–1)
BUN SERPL-MCNC: 17 MG/DL (ref 5–25)
CALCIUM SERPL-MCNC: 10 MG/DL (ref 8.3–10.1)
CHLORIDE SERPL-SCNC: 109 MMOL/L (ref 100–108)
CHOLEST SERPL-MCNC: 165 MG/DL
CO2 SERPL-SCNC: 29 MMOL/L (ref 21–32)
CREAT SERPL-MCNC: 1.03 MG/DL (ref 0.6–1.3)
EOSINOPHIL # BLD AUTO: 0.12 THOUSAND/ΜL (ref 0–0.61)
EOSINOPHIL NFR BLD AUTO: 2 % (ref 0–6)
ERYTHROCYTE [DISTWIDTH] IN BLOOD BY AUTOMATED COUNT: 12.3 % (ref 11.6–15.1)
GFR SERPL CREATININE-BSD FRML MDRD: 77 ML/MIN/1.73SQ M
GLUCOSE P FAST SERPL-MCNC: 91 MG/DL (ref 65–99)
HCT VFR BLD AUTO: 46.5 % (ref 36.5–49.3)
HCV AB SER QL: NORMAL
HDLC SERPL-MCNC: 41 MG/DL
HGB BLD-MCNC: 15.5 G/DL (ref 12–17)
IMM GRANULOCYTES # BLD AUTO: 0.02 THOUSAND/UL (ref 0–0.2)
IMM GRANULOCYTES NFR BLD AUTO: 0 % (ref 0–2)
LDLC SERPL CALC-MCNC: 100 MG/DL (ref 0–100)
LYMPHOCYTES # BLD AUTO: 2.03 THOUSANDS/ΜL (ref 0.6–4.47)
LYMPHOCYTES NFR BLD AUTO: 30 % (ref 14–44)
MCH RBC QN AUTO: 31.8 PG (ref 26.8–34.3)
MCHC RBC AUTO-ENTMCNC: 33.3 G/DL (ref 31.4–37.4)
MCV RBC AUTO: 96 FL (ref 82–98)
MONOCYTES # BLD AUTO: 0.5 THOUSAND/ΜL (ref 0.17–1.22)
MONOCYTES NFR BLD AUTO: 7 % (ref 4–12)
NEUTROPHILS # BLD AUTO: 4.04 THOUSANDS/ΜL (ref 1.85–7.62)
NEUTS SEG NFR BLD AUTO: 61 % (ref 43–75)
NONHDLC SERPL-MCNC: 124 MG/DL
NRBC BLD AUTO-RTO: 0 /100 WBCS
PLATELET # BLD AUTO: 293 THOUSANDS/UL (ref 149–390)
PMV BLD AUTO: 9.8 FL (ref 8.9–12.7)
POTASSIUM SERPL-SCNC: 4.5 MMOL/L (ref 3.5–5.3)
PROT SERPL-MCNC: 7.3 G/DL (ref 6.4–8.2)
PSA SERPL-MCNC: 1.2 NG/ML (ref 0–4)
RBC # BLD AUTO: 4.87 MILLION/UL (ref 3.88–5.62)
SODIUM SERPL-SCNC: 142 MMOL/L (ref 136–145)
TRIGL SERPL-MCNC: 119 MG/DL
WBC # BLD AUTO: 6.74 THOUSAND/UL (ref 4.31–10.16)

## 2022-05-20 PROCEDURE — 85025 COMPLETE CBC W/AUTO DIFF WBC: CPT | Performed by: INTERNAL MEDICINE

## 2022-05-20 PROCEDURE — G0103 PSA SCREENING: HCPCS

## 2022-05-20 PROCEDURE — 36415 COLL VENOUS BLD VENIPUNCTURE: CPT | Performed by: INTERNAL MEDICINE

## 2022-05-20 PROCEDURE — 86803 HEPATITIS C AB TEST: CPT

## 2022-05-20 PROCEDURE — 80061 LIPID PANEL: CPT | Performed by: INTERNAL MEDICINE

## 2022-05-20 PROCEDURE — 80053 COMPREHEN METABOLIC PANEL: CPT | Performed by: INTERNAL MEDICINE

## 2022-05-20 PROCEDURE — 87389 HIV-1 AG W/HIV-1&-2 AB AG IA: CPT

## 2022-05-22 LAB — HIV 1+2 AB+HIV1 P24 AG SERPL QL IA: NORMAL

## 2023-05-08 ENCOUNTER — OFFICE VISIT (OUTPATIENT)
Dept: INTERNAL MEDICINE CLINIC | Facility: CLINIC | Age: 63
End: 2023-05-08

## 2023-05-08 VITALS
BODY MASS INDEX: 24.12 KG/M2 | WEIGHT: 194 LBS | TEMPERATURE: 97.6 F | HEART RATE: 80 BPM | RESPIRATION RATE: 13 BRPM | SYSTOLIC BLOOD PRESSURE: 144 MMHG | HEIGHT: 75 IN | DIASTOLIC BLOOD PRESSURE: 92 MMHG

## 2023-05-08 DIAGNOSIS — I10 HYPERTENSION, UNSPECIFIED TYPE: ICD-10-CM

## 2023-05-08 DIAGNOSIS — M19.071 ARTHRITIS OF RIGHT ANKLE: ICD-10-CM

## 2023-05-08 DIAGNOSIS — E78.00 PURE HYPERCHOLESTEROLEMIA: ICD-10-CM

## 2023-05-08 DIAGNOSIS — I10 ESSENTIAL HYPERTENSION: Primary | ICD-10-CM

## 2023-05-08 DIAGNOSIS — E78.00 HYPERCHOLESTEREMIA: ICD-10-CM

## 2023-05-08 DIAGNOSIS — I25.10 CORONARY ARTERY DISEASE INVOLVING NATIVE CORONARY ARTERY OF NATIVE HEART WITHOUT ANGINA PECTORIS: ICD-10-CM

## 2023-05-08 DIAGNOSIS — Z12.5 SCREENING FOR PROSTATE CANCER: ICD-10-CM

## 2023-05-08 PROBLEM — Z86.010 HISTORY OF COLON POLYPS: Status: ACTIVE | Noted: 2023-05-08

## 2023-05-08 PROBLEM — Z86.0100 HISTORY OF COLON POLYPS: Status: ACTIVE | Noted: 2023-05-08

## 2023-05-08 RX ORDER — METOPROLOL SUCCINATE 25 MG/1
25 TABLET, EXTENDED RELEASE ORAL DAILY
Qty: 90 TABLET | Refills: 3 | Status: SHIPPED | OUTPATIENT
Start: 2023-05-08

## 2023-05-08 RX ORDER — ATORVASTATIN CALCIUM 40 MG/1
40 TABLET, FILM COATED ORAL
Qty: 90 TABLET | Refills: 3 | Status: SHIPPED | OUTPATIENT
Start: 2023-05-08

## 2023-05-08 NOTE — PROGRESS NOTES
INTERNAL MEDICINE OFFICE VISIT  [facility] Internal Medicine- [place]    NAME: Merari Sawant  AGE: 60 y o  SEX: male    DATE OF ENCOUNTER: 5/8/2023    Assessment and Plan/History of Present Illness     Here today for follow-up  Medical history of CAD status post PCI, arthritis of the right ankle    No significant concerns today from a health standpoint  No recent angina or exercise intolerance  Has some bothersome arthritis particularly affecting the right ankle      1  Essential hypertension  Assessment & Plan:  Elevated in the office, he checks periodically at home and reports BP is better controlled with systolic readings in the 656W to 130s  Continue Toprol-XL    Orders:  -     CBC and differential; Future  -     Comprehensive metabolic panel; Future    2  Coronary artery disease involving native coronary artery of native heart without angina pectoris  Assessment & Plan:  History of coronary stent placement in 2012  Continue aspirin, high intensity statin, Toprol-XL  Goal LDL less than 70  Most recent LDL of 100 above goal last year  Recheck lipid panel  Goal BP less than 130/80      3  Pure hypercholesterolemia  Assessment & Plan:  See plan for CAD       4  Hypercholesteremia  -     atorvastatin (LIPITOR) 40 mg tablet; Take 1 tablet (40 mg total) by mouth daily at bedtime  -     Lipid panel; Future    5  Hypertension, unspecified type  -     metoprolol succinate (TOPROL-XL) 25 mg 24 hr tablet; Take 1 tablet (25 mg total) by mouth daily    6  Screening for prostate cancer  -     PSA, Total Screen; Future    7  Arthritis of right ankle  Assessment & Plan:  Patient reports longstanding history of arthritis of the right ankle  He injured the ankle a number of years ago    Saw orthopedics 20+ years ago and was told that fusion of the ankle is an option  -He has been managing conservatively with topical medications, heat, etc  If his discomfort is significantly affecting his quality of life we will refer him "back to orthopedics                 Orders Placed This Encounter   Procedures   • CBC and differential   • Comprehensive metabolic panel   • Lipid panel   • PSA, Total Screen       Chief Complaint     Chief Complaint   Patient presents with   • Follow-up     Overdue - last seen 6 17 2021  • Ankle Pain     Right ankle pain from previous arthritis       Review of Systems     10 point ROS negative except per HPI    The following portions of the patient's history were reviewed and updated as appropriate: allergies, current medications, past family history, past medical history, past social history, past surgical history and problem list     Active Problem List     Patient Active Problem List   Diagnosis   • Coronary artery disease involving native coronary artery of native heart without angina pectoris   • Pure hypercholesterolemia   • Essential hypertension   • Esophageal reflux   • Allergic rhinitis   • History of colon polyps   • Arthritis of right ankle       Objective     /92 (BP Location: Left arm, Patient Position: Sitting, Cuff Size: Standard)   Pulse 80   Temp 97 6 °F (36 4 °C)   Resp 13   Ht 6' 3\" (1 905 m)   Wt 88 kg (194 lb)   BMI 24 25 kg/m²     Physical Exam  Constitutional:       Appearance: Normal appearance  He is not ill-appearing  HENT:      Head: Normocephalic and atraumatic  Eyes:      General: No scleral icterus  Right eye: No discharge  Left eye: No discharge  Cardiovascular:      Rate and Rhythm: Normal rate and regular rhythm  Heart sounds: No murmur heard  No friction rub  Pulmonary:      Effort: Pulmonary effort is normal       Breath sounds: Normal breath sounds  No wheezing or rales  Musculoskeletal:         General: No swelling or tenderness  Skin:     Findings: No erythema or rash  Neurological:      Mental Status: He is alert  Mental status is at baseline        Gait: Gait normal    Psychiatric:         Mood and Affect: Mood normal         " Behavior: Behavior normal          Pertinent Laboratory/Diagnostic Studies:  No results found  Images and diagnostics reviewed     Current Medications     Current Outpatient Medications:   •  atorvastatin (LIPITOR) 40 mg tablet, Take 1 tablet (40 mg total) by mouth daily at bedtime, Disp: 90 tablet, Rfl: 3  •  metoprolol succinate (TOPROL-XL) 25 mg 24 hr tablet, Take 1 tablet (25 mg total) by mouth daily, Disp: 90 tablet, Rfl: 3  •  aspirin (ECOTRIN LOW STRENGTH) 81 mg EC tablet, Take 81 mg by mouth, Disp: , Rfl:     Health Maintenance     Health Maintenance   Topic Date Due   • DTaP,Tdap,and Td Vaccines (1 - Tdap) Never done   • COVID-19 Vaccine (3 - Booster for Moderna series) 07/14/2021   • Annual Physical  06/17/2022   • Influenza Vaccine (Season Ended) 09/01/2023   • Depression Screening  05/08/2024   • BMI: Adult  05/08/2024   • Colorectal Cancer Screening  01/13/2025   • HIV Screening  Completed   • Hepatitis C Screening  Completed   • Pneumococcal Vaccine: Pediatrics (0 to 5 Years) and At-Risk Patients (6 to 59 Years)  Aged Out   • HIB Vaccine  Aged Out   • IPV Vaccine  Aged Out   • Hepatitis A Vaccine  Aged Out   • Meningococcal ACWY Vaccine  Aged Out   • HPV Vaccine  Aged [employer] History   Administered Date(s) Administered   • COVID-19 MODERNA VACC 0 5 ML IM 04/15/2021, 05/19/2021       DULCE Scott  Internal Medicine [address], [facility] [address]  [facility], [address] [facility]  Office: [phone]  Fax: [phone]

## 2023-05-08 NOTE — ASSESSMENT & PLAN NOTE
History of coronary stent placement in 2012  Continue aspirin, high intensity statin, Toprol-XL  Goal LDL less than 70  Most recent LDL of 100 above goal last year    Recheck lipid panel  Goal BP less than 130/80

## 2023-05-08 NOTE — ASSESSMENT & PLAN NOTE
Patient reports longstanding history of arthritis of the right ankle  He injured the ankle a number of years ago    Saw orthopedics 20+ years ago and was told that fusion of the ankle is an option  -He has been managing conservatively with topical medications, heat, etc  If his discomfort is significantly affecting his quality of life we will refer him back to orthopedics

## 2023-05-08 NOTE — ASSESSMENT & PLAN NOTE
Elevated in the office, he checks periodically at home and reports BP is better controlled with systolic readings in the 017R to 130s  Continue Toprol-XL

## 2023-05-08 NOTE — ASSESSMENT & PLAN NOTE
Colonoscopy completed January 2020   3 mm polyp removed from the sigmoid colon, pathology consistent with hyperplastic polyp

## 2023-06-26 ENCOUNTER — APPOINTMENT (OUTPATIENT)
Dept: LAB | Facility: CLINIC | Age: 63
End: 2023-06-26
Payer: COMMERCIAL

## 2023-06-26 DIAGNOSIS — E78.00 HYPERCHOLESTEREMIA: ICD-10-CM

## 2023-06-26 DIAGNOSIS — Z12.5 SCREENING FOR PROSTATE CANCER: ICD-10-CM

## 2023-06-26 DIAGNOSIS — I10 ESSENTIAL HYPERTENSION: ICD-10-CM

## 2023-06-26 LAB
ALBUMIN SERPL BCP-MCNC: 3.8 G/DL (ref 3.5–5)
ALP SERPL-CCNC: 94 U/L (ref 46–116)
ALT SERPL W P-5'-P-CCNC: 22 U/L (ref 12–78)
ANION GAP SERPL CALCULATED.3IONS-SCNC: 2 MMOL/L
AST SERPL W P-5'-P-CCNC: 18 U/L (ref 5–45)
BASOPHILS # BLD AUTO: 0.03 THOUSANDS/ÂΜL (ref 0–0.1)
BASOPHILS NFR BLD AUTO: 1 % (ref 0–1)
BILIRUB SERPL-MCNC: 0.5 MG/DL (ref 0.2–1)
BUN SERPL-MCNC: 13 MG/DL (ref 5–25)
CALCIUM SERPL-MCNC: 9.6 MG/DL (ref 8.3–10.1)
CHLORIDE SERPL-SCNC: 108 MMOL/L (ref 96–108)
CHOLEST SERPL-MCNC: 194 MG/DL
CO2 SERPL-SCNC: 27 MMOL/L (ref 21–32)
CREAT SERPL-MCNC: 0.98 MG/DL (ref 0.6–1.3)
EOSINOPHIL # BLD AUTO: 0.11 THOUSAND/ÂΜL (ref 0–0.61)
EOSINOPHIL NFR BLD AUTO: 2 % (ref 0–6)
ERYTHROCYTE [DISTWIDTH] IN BLOOD BY AUTOMATED COUNT: 12.8 % (ref 11.6–15.1)
GFR SERPL CREATININE-BSD FRML MDRD: 81 ML/MIN/1.73SQ M
GLUCOSE P FAST SERPL-MCNC: 102 MG/DL (ref 65–99)
HCT VFR BLD AUTO: 47.7 % (ref 36.5–49.3)
HDLC SERPL-MCNC: 34 MG/DL
HGB BLD-MCNC: 15.5 G/DL (ref 12–17)
IMM GRANULOCYTES # BLD AUTO: 0.02 THOUSAND/UL (ref 0–0.2)
IMM GRANULOCYTES NFR BLD AUTO: 0 % (ref 0–2)
LDLC SERPL CALC-MCNC: 114 MG/DL (ref 0–100)
LYMPHOCYTES # BLD AUTO: 1.87 THOUSANDS/ÂΜL (ref 0.6–4.47)
LYMPHOCYTES NFR BLD AUTO: 32 % (ref 14–44)
MCH RBC QN AUTO: 31.1 PG (ref 26.8–34.3)
MCHC RBC AUTO-ENTMCNC: 32.5 G/DL (ref 31.4–37.4)
MCV RBC AUTO: 96 FL (ref 82–98)
MONOCYTES # BLD AUTO: 0.51 THOUSAND/ÂΜL (ref 0.17–1.22)
MONOCYTES NFR BLD AUTO: 9 % (ref 4–12)
NEUTROPHILS # BLD AUTO: 3.38 THOUSANDS/ÂΜL (ref 1.85–7.62)
NEUTS SEG NFR BLD AUTO: 56 % (ref 43–75)
NONHDLC SERPL-MCNC: 160 MG/DL
NRBC BLD AUTO-RTO: 0 /100 WBCS
PLATELET # BLD AUTO: 291 THOUSANDS/UL (ref 149–390)
PMV BLD AUTO: 9.7 FL (ref 8.9–12.7)
POTASSIUM SERPL-SCNC: 4.8 MMOL/L (ref 3.5–5.3)
PROT SERPL-MCNC: 7.5 G/DL (ref 6.4–8.4)
PSA SERPL-MCNC: 1.57 NG/ML (ref 0–4)
RBC # BLD AUTO: 4.99 MILLION/UL (ref 3.88–5.62)
SODIUM SERPL-SCNC: 137 MMOL/L (ref 135–147)
TRIGL SERPL-MCNC: 232 MG/DL
WBC # BLD AUTO: 5.92 THOUSAND/UL (ref 4.31–10.16)

## 2023-06-26 PROCEDURE — 80053 COMPREHEN METABOLIC PANEL: CPT

## 2023-06-26 PROCEDURE — 36415 COLL VENOUS BLD VENIPUNCTURE: CPT

## 2023-06-26 PROCEDURE — 80061 LIPID PANEL: CPT

## 2023-06-26 PROCEDURE — G0103 PSA SCREENING: HCPCS

## 2023-06-26 PROCEDURE — 85025 COMPLETE CBC W/AUTO DIFF WBC: CPT

## 2023-07-07 ENCOUNTER — TELEPHONE (OUTPATIENT)
Dept: INTERNAL MEDICINE CLINIC | Facility: CLINIC | Age: 63
End: 2023-07-07

## 2023-07-07 NOTE — TELEPHONE ENCOUNTER
----- Message from [address] , DO sent at 6/30/2023 12:56 PM EDT -----  Reviewed labs. Overall look fine. LDL (bad cholesterol) is a bit high at 114. Recommend heart healthy Mediterranean diet, regular exercise. I would also suggest increase in atorvastatin dose to 80 mg daily to reduce cholesterol and risk of cardiovascular disease. If Mat Leggett agreeable, I can send updated prescription to pharmacy.   Thanks

## 2023-08-02 ENCOUNTER — OFFICE VISIT (OUTPATIENT)
Dept: CARDIOLOGY CLINIC | Facility: CLINIC | Age: 63
End: 2023-08-02
Payer: COMMERCIAL

## 2023-08-02 VITALS
SYSTOLIC BLOOD PRESSURE: 132 MMHG | DIASTOLIC BLOOD PRESSURE: 78 MMHG | HEART RATE: 59 BPM | BODY MASS INDEX: 23 KG/M2 | WEIGHT: 185 LBS | HEIGHT: 75 IN | OXYGEN SATURATION: 96 %

## 2023-08-02 DIAGNOSIS — E78.00 PURE HYPERCHOLESTEROLEMIA: ICD-10-CM

## 2023-08-02 DIAGNOSIS — I10 ESSENTIAL HYPERTENSION: ICD-10-CM

## 2023-08-02 DIAGNOSIS — I25.10 CORONARY ARTERY DISEASE INVOLVING NATIVE CORONARY ARTERY OF NATIVE HEART WITHOUT ANGINA PECTORIS: Primary | ICD-10-CM

## 2023-08-02 PROCEDURE — 99214 OFFICE O/P EST MOD 30 MIN: CPT | Performed by: INTERNAL MEDICINE

## 2023-08-02 NOTE — PROGRESS NOTES
Cardiology Follow Up    Lacie Az  1960  [id number]  [address] [facility] [id number] [phone]    1. Coronary artery disease involving native coronary artery of native heart without angina pectoris        2. Essential hypertension        3. Pure hypercholesterolemia            Interval History: No complaints. Denies chest pain shortness of breath orthopnea paroxysmal nocturnal dyspnea or syncope. Patient Active Problem List   Diagnosis   • Coronary artery disease involving native coronary artery of native heart without angina pectoris   • Pure hypercholesterolemia   • Essential hypertension   • Esophageal reflux   • Allergic rhinitis   • History of colon polyps   • Arthritis of right ankle     History reviewed. No pertinent past medical history.   Social History     Socioeconomic History   • Marital status: /Civil Union     Spouse name: Not on file   • Number of children: Not on file   • Years of education: Not on file   • Highest education level: Not on file   Occupational History   • Not on file   Tobacco Use   • Smoking status: Former     Types: Cigarettes     Quit date:      Years since quittin.5   • Smokeless tobacco: Never   Substance and Sexual Activity   • Alcohol use: Not on file   • Drug use: Not on file   • Sexual activity: Not on file   Other Topics Concern   • Not on file   Social History Narrative   • Not on file     Social Determinants of Health     Financial Resource Strain: Not on file   Food Insecurity: Not on file   Transportation Needs: Not on file   Physical Activity: Not on file   Stress: Not on file   Social Connections: Not on file   Intimate Partner Violence: Not on file   Housing Stability: Not on file      Family History   Problem Relation Age of Onset   • Heart disease Mother    • Heart disease Father      Past Surgical History:   Procedure Laterality Date   • APPENDECTOMY     • CORONARY ANGIOPLASTY WITH STENT PLACEMENT     • HERNIA REPAIR         Current Outpatient Medications:   •  aspirin (ECOTRIN LOW STRENGTH) 81 mg EC tablet, Take 81 mg by mouth, Disp: , Rfl:   •  atorvastatin (LIPITOR) 40 mg tablet, Take 1 tablet (40 mg total) by mouth daily at bedtime, Disp: 90 tablet, Rfl: 3  •  metoprolol succinate (TOPROL-XL) 25 mg 24 hr tablet, Take 1 tablet (25 mg total) by mouth daily, Disp: 90 tablet, Rfl: 3  Allergies   Allergen Reactions   • Penicillins Rash       Labs:  Appointment on 06/26/2023   Component Date Value   • WBC 06/26/2023 5.92    • RBC 06/26/2023 4.99    • Hemoglobin 06/26/2023 15.5    • Hematocrit 06/26/2023 47.7    • MCV 06/26/2023 96    • MCH 06/26/2023 31.1    • MCHC 06/26/2023 32.5    • RDW 06/26/2023 12.8    • MPV 06/26/2023 9.7    • Platelets 89/99/5098 291    • nRBC 06/26/2023 0    • Neutrophils Relative 06/26/2023 56    • Immat GRANS % 06/26/2023 0    • Lymphocytes Relative 06/26/2023 32    • Monocytes Relative 06/26/2023 9    • Eosinophils Relative 06/26/2023 2    • Basophils Relative 06/26/2023 1    • Neutrophils Absolute 06/26/2023 3.38    • Immature Grans Absolute 06/26/2023 0.02    • Lymphocytes Absolute 06/26/2023 1.87    • Monocytes Absolute 06/26/2023 0.51    • Eosinophils Absolute 06/26/2023 0.11    • Basophils Absolute 06/26/2023 0.03    • Sodium 06/26/2023 137    • Potassium 06/26/2023 4.8    • Chloride 06/26/2023 108    • CO2 06/26/2023 27    • ANION GAP 06/26/2023 2    • BUN 06/26/2023 13    • Creatinine 06/26/2023 0.98    • Glucose, Fasting 06/26/2023 102 (H)    • Calcium 06/26/2023 9.6    • AST 06/26/2023 18    • ALT 06/26/2023 22    • Alkaline Phosphatase 06/26/2023 94    • Total Protein 06/26/2023 7.5    • Albumin 06/26/2023 3.8    • Total Bilirubin 06/26/2023 0.50    • eGFR 06/26/2023 81    • Cholesterol 06/26/2023 194    • Triglycerides 06/26/2023 232 (H)    • HDL, Direct 06/26/2023 34 (L)    • LDL Calculated 06/26/2023 114 (H)    • Non-HDL-Chol (CHOL-HDL) 06/26/2023 160    • PSA 06/26/2023 1.57      Imaging: No results found. Review of Systems:  Review of Systems   Constitutional: Negative for chills and fever. HENT: Negative for ear pain and sore throat. Eyes: Negative for pain and visual disturbance. Respiratory: Negative for cough and shortness of breath. Cardiovascular: Negative for chest pain and palpitations. Gastrointestinal: Negative for abdominal pain and vomiting. Genitourinary: Negative for dysuria and hematuria. Musculoskeletal: Negative for arthralgias and back pain. Skin: Negative for color change and rash. Neurological: Negative for seizures and syncope. All other systems reviewed and are negative. Physical Exam:  Physical Exam  Vitals and nursing note reviewed. Constitutional:       Appearance: Normal appearance. HENT:      Head: Normocephalic and atraumatic. Nose: Nose normal.      Mouth/Throat:      Mouth: Mucous membranes are moist.   Eyes:      Conjunctiva/sclera: Conjunctivae normal.   Cardiovascular:      Rate and Rhythm: Normal rate and regular rhythm. Pulmonary:      Effort: Pulmonary effort is normal.      Breath sounds: Normal breath sounds. Abdominal:      Palpations: Abdomen is soft. Musculoskeletal:         General: Normal range of motion. Cervical back: Normal range of motion. Skin:     General: Skin is warm and dry. Neurological:      General: No focal deficit present. Mental Status: He is alert and oriented to person, place, and time. Psychiatric:         Mood and Affect: Mood normal.         Discussion/Summary: Remote history of coronary stent in the setting of myocardial infarction. Stress test in in 2018 showed no myocardial ischemia. We discussed stress testing today and he would like to defer. Blood pressure controlled on metoprolol. He is on high-dose statin. Most recent LDL was 114.   He noted that he had not been taking his atorvastatin for period of time when he had that blood work done and that he is back on it now. We will check a fasting lipid profile in 6 months. He continues on Ecotrin. I have made no changes I will see him again in   1 year.

## 2024-10-12 ENCOUNTER — HOSPITAL ENCOUNTER (INPATIENT)
Facility: HOSPITAL | Age: 64
LOS: 1 days | Discharge: HOME | DRG: 322 | End: 2024-10-14
Attending: EMERGENCY MEDICINE | Admitting: INTERNAL MEDICINE
Payer: COMMERCIAL

## 2024-10-12 ENCOUNTER — APPOINTMENT (EMERGENCY)
Dept: RADIOLOGY | Facility: HOSPITAL | Age: 64
DRG: 322 | End: 2024-10-12
Payer: COMMERCIAL

## 2024-10-12 DIAGNOSIS — I21.3 STEMI (ST ELEVATION MYOCARDIAL INFARCTION) (HCC): Primary | ICD-10-CM

## 2024-10-12 DIAGNOSIS — I25.10 CAD (CORONARY ARTERY DISEASE): ICD-10-CM

## 2024-10-12 LAB
2HR DELTA HS TROPONIN: 9257 NG/L
ANION GAP SERPL CALCULATED.3IONS-SCNC: 9 MMOL/L (ref 4–13)
APTT PPP: 27 SECONDS (ref 23–34)
BASOPHILS # BLD AUTO: 0.06 THOUSANDS/ΜL (ref 0–0.1)
BASOPHILS NFR BLD AUTO: 1 % (ref 0–1)
BUN SERPL-MCNC: 14 MG/DL (ref 5–25)
CALCIUM SERPL-MCNC: 9.9 MG/DL (ref 8.4–10.2)
CARDIAC TROPONIN I PNL SERPL HS: 104 NG/L
CARDIAC TROPONIN I PNL SERPL HS: 9361 NG/L
CHLORIDE SERPL-SCNC: 102 MMOL/L (ref 96–108)
CHOLEST SERPL-MCNC: 203 MG/DL
CO2 SERPL-SCNC: 29 MMOL/L (ref 21–32)
CREAT SERPL-MCNC: 1.21 MG/DL (ref 0.6–1.3)
EOSINOPHIL # BLD AUTO: 0.13 THOUSAND/ΜL (ref 0–0.61)
EOSINOPHIL NFR BLD AUTO: 2 % (ref 0–6)
ERYTHROCYTE [DISTWIDTH] IN BLOOD BY AUTOMATED COUNT: 11.9 % (ref 11.6–15.1)
GFR SERPL CREATININE-BSD FRML MDRD: 62 ML/MIN/1.73SQ M
GLUCOSE SERPL-MCNC: 112 MG/DL (ref 65–140)
HCT VFR BLD AUTO: 43.8 % (ref 36.5–49.3)
HDLC SERPL-MCNC: 36 MG/DL
HGB BLD-MCNC: 15.5 G/DL (ref 12–17)
IMM GRANULOCYTES # BLD AUTO: 0.02 THOUSAND/UL (ref 0–0.2)
IMM GRANULOCYTES NFR BLD AUTO: 0 % (ref 0–2)
INR PPP: 1.02 (ref 0.85–1.19)
KCT BLD-ACNC: 204 SEC (ref 89–137)
LDLC SERPL CALC-MCNC: 113 MG/DL (ref 0–100)
LYMPHOCYTES # BLD AUTO: 3.5 THOUSANDS/ΜL (ref 0.6–4.47)
LYMPHOCYTES NFR BLD AUTO: 43 % (ref 14–44)
MAGNESIUM SERPL-MCNC: 2.2 MG/DL (ref 1.9–2.7)
MCH RBC QN AUTO: 31.6 PG (ref 26.8–34.3)
MCHC RBC AUTO-ENTMCNC: 35.4 G/DL (ref 31.4–37.4)
MCV RBC AUTO: 89 FL (ref 82–98)
MONOCYTES # BLD AUTO: 0.65 THOUSAND/ΜL (ref 0.17–1.22)
MONOCYTES NFR BLD AUTO: 8 % (ref 4–12)
NEUTROPHILS # BLD AUTO: 3.88 THOUSANDS/ΜL (ref 1.85–7.62)
NEUTS SEG NFR BLD AUTO: 46 % (ref 43–75)
NONHDLC SERPL-MCNC: 167 MG/DL
NRBC BLD AUTO-RTO: 0 /100 WBCS
PLATELET # BLD AUTO: 312 THOUSANDS/UL (ref 149–390)
PMV BLD AUTO: 9.2 FL (ref 8.9–12.7)
POTASSIUM SERPL-SCNC: 3.7 MMOL/L (ref 3.5–5.3)
PROTHROMBIN TIME: 13.6 SECONDS (ref 12.3–15)
RBC # BLD AUTO: 4.91 MILLION/UL (ref 3.88–5.62)
SODIUM SERPL-SCNC: 140 MMOL/L (ref 135–147)
SPECIMEN SOURCE: ABNORMAL
TRIGL SERPL-MCNC: 271 MG/DL
WBC # BLD AUTO: 8.24 THOUSAND/UL (ref 4.31–10.16)

## 2024-10-12 PROCEDURE — 83735 ASSAY OF MAGNESIUM: CPT | Performed by: EMERGENCY MEDICINE

## 2024-10-12 PROCEDURE — C1887 CATHETER, GUIDING: HCPCS | Performed by: INTERNAL MEDICINE

## 2024-10-12 PROCEDURE — 99152 MOD SED SAME PHYS/QHP 5/>YRS: CPT | Performed by: INTERNAL MEDICINE

## 2024-10-12 PROCEDURE — 027034Z DILATION OF CORONARY ARTERY, ONE ARTERY WITH DRUG-ELUTING INTRALUMINAL DEVICE, PERCUTANEOUS APPROACH: ICD-10-PCS | Performed by: INTERNAL MEDICINE

## 2024-10-12 PROCEDURE — B2111ZZ FLUOROSCOPY OF MULTIPLE CORONARY ARTERIES USING LOW OSMOLAR CONTRAST: ICD-10-PCS | Performed by: INTERNAL MEDICINE

## 2024-10-12 PROCEDURE — 4A023N7 MEASUREMENT OF CARDIAC SAMPLING AND PRESSURE, LEFT HEART, PERCUTANEOUS APPROACH: ICD-10-PCS | Performed by: INTERNAL MEDICINE

## 2024-10-12 PROCEDURE — 80061 LIPID PANEL: CPT | Performed by: EMERGENCY MEDICINE

## 2024-10-12 PROCEDURE — 93458 L HRT ARTERY/VENTRICLE ANGIO: CPT | Performed by: INTERNAL MEDICINE

## 2024-10-12 PROCEDURE — 85730 THROMBOPLASTIN TIME PARTIAL: CPT | Performed by: EMERGENCY MEDICINE

## 2024-10-12 PROCEDURE — C1874 STENT, COATED/COV W/DEL SYS: HCPCS | Performed by: INTERNAL MEDICINE

## 2024-10-12 PROCEDURE — C1725 CATH, TRANSLUMIN NON-LASER: HCPCS | Performed by: INTERNAL MEDICINE

## 2024-10-12 PROCEDURE — C1894 INTRO/SHEATH, NON-LASER: HCPCS | Performed by: INTERNAL MEDICINE

## 2024-10-12 PROCEDURE — C9606 PERC D-E COR REVASC W AMI S: HCPCS | Performed by: INTERNAL MEDICINE

## 2024-10-12 PROCEDURE — 99153 MOD SED SAME PHYS/QHP EA: CPT | Performed by: INTERNAL MEDICINE

## 2024-10-12 PROCEDURE — 99291 CRITICAL CARE FIRST HOUR: CPT | Performed by: EMERGENCY MEDICINE

## 2024-10-12 PROCEDURE — 84484 ASSAY OF TROPONIN QUANT: CPT | Performed by: EMERGENCY MEDICINE

## 2024-10-12 PROCEDURE — C1769 GUIDE WIRE: HCPCS | Performed by: INTERNAL MEDICINE

## 2024-10-12 PROCEDURE — 85347 COAGULATION TIME ACTIVATED: CPT

## 2024-10-12 PROCEDURE — 96365 THER/PROPH/DIAG IV INF INIT: CPT

## 2024-10-12 PROCEDURE — 93005 ELECTROCARDIOGRAM TRACING: CPT

## 2024-10-12 PROCEDURE — NC001 PR NO CHARGE: Performed by: INTERNAL MEDICINE

## 2024-10-12 PROCEDURE — 99285 EMERGENCY DEPT VISIT HI MDM: CPT

## 2024-10-12 PROCEDURE — 85610 PROTHROMBIN TIME: CPT | Performed by: EMERGENCY MEDICINE

## 2024-10-12 PROCEDURE — 80048 BASIC METABOLIC PNL TOTAL CA: CPT | Performed by: EMERGENCY MEDICINE

## 2024-10-12 PROCEDURE — 36415 COLL VENOUS BLD VENIPUNCTURE: CPT | Performed by: EMERGENCY MEDICINE

## 2024-10-12 PROCEDURE — 99232 SBSQ HOSP IP/OBS MODERATE 35: CPT | Performed by: NURSE PRACTITIONER

## 2024-10-12 PROCEDURE — 92941 PRQ TRLML REVSC TOT OCCL AMI: CPT | Performed by: INTERNAL MEDICINE

## 2024-10-12 PROCEDURE — 96374 THER/PROPH/DIAG INJ IV PUSH: CPT

## 2024-10-12 PROCEDURE — 85025 COMPLETE CBC W/AUTO DIFF WBC: CPT | Performed by: EMERGENCY MEDICINE

## 2024-10-12 PROCEDURE — 96375 TX/PRO/DX INJ NEW DRUG ADDON: CPT

## 2024-10-12 DEVICE — STENT ONYXNG25022UX ONYX 2.50X22RX
Type: IMPLANTABLE DEVICE | Status: FUNCTIONAL
Brand: ONYX FRONTIER™

## 2024-10-12 RX ORDER — FENTANYL CITRATE 50 UG/ML
50 INJECTION, SOLUTION INTRAMUSCULAR; INTRAVENOUS ONCE
Status: COMPLETED | OUTPATIENT
Start: 2024-10-12 | End: 2024-10-12

## 2024-10-12 RX ORDER — FENTANYL CITRATE 50 UG/ML
INJECTION, SOLUTION INTRAMUSCULAR; INTRAVENOUS CODE/TRAUMA/SEDATION MEDICATION
Status: DISCONTINUED | OUTPATIENT
Start: 2024-10-12 | End: 2024-10-12 | Stop reason: HOSPADM

## 2024-10-12 RX ORDER — HEPARIN SODIUM 1000 [USP'U]/ML
2000 INJECTION, SOLUTION INTRAVENOUS; SUBCUTANEOUS EVERY 6 HOURS PRN
Status: DISCONTINUED | OUTPATIENT
Start: 2024-10-12 | End: 2024-10-13

## 2024-10-12 RX ORDER — ATROPINE SULFATE 0.1 MG/ML
INJECTION INTRAVENOUS
Status: DISPENSED
Start: 2024-10-12 | End: 2024-10-13

## 2024-10-12 RX ORDER — ATORVASTATIN CALCIUM 80 MG/1
80 TABLET, FILM COATED ORAL
Status: DISCONTINUED | OUTPATIENT
Start: 2024-10-13 | End: 2024-10-14 | Stop reason: HOSPADM

## 2024-10-12 RX ORDER — HEPARIN SODIUM 1000 [USP'U]/ML
INJECTION, SOLUTION INTRAVENOUS; SUBCUTANEOUS CODE/TRAUMA/SEDATION MEDICATION
Status: DISCONTINUED | OUTPATIENT
Start: 2024-10-12 | End: 2024-10-12 | Stop reason: HOSPADM

## 2024-10-12 RX ORDER — HEPARIN SODIUM 1000 [USP'U]/ML
4000 INJECTION, SOLUTION INTRAVENOUS; SUBCUTANEOUS ONCE
Status: COMPLETED | OUTPATIENT
Start: 2024-10-12 | End: 2024-10-12

## 2024-10-12 RX ORDER — HEPARIN SODIUM 1000 [USP'U]/ML
4000 INJECTION, SOLUTION INTRAVENOUS; SUBCUTANEOUS EVERY 6 HOURS PRN
Status: DISCONTINUED | OUTPATIENT
Start: 2024-10-12 | End: 2024-10-13

## 2024-10-12 RX ORDER — NITROGLYCERIN 20 MG/100ML
INJECTION INTRAVENOUS CODE/TRAUMA/SEDATION MEDICATION
Status: DISCONTINUED | OUTPATIENT
Start: 2024-10-12 | End: 2024-10-12 | Stop reason: HOSPADM

## 2024-10-12 RX ORDER — SODIUM CHLORIDE 9 MG/ML
75 INJECTION, SOLUTION INTRAVENOUS CONTINUOUS
Status: DISCONTINUED | OUTPATIENT
Start: 2024-10-12 | End: 2024-10-14 | Stop reason: HOSPADM

## 2024-10-12 RX ORDER — MIDAZOLAM HYDROCHLORIDE 2 MG/2ML
INJECTION, SOLUTION INTRAMUSCULAR; INTRAVENOUS CODE/TRAUMA/SEDATION MEDICATION
Status: DISCONTINUED | OUTPATIENT
Start: 2024-10-12 | End: 2024-10-12 | Stop reason: HOSPADM

## 2024-10-12 RX ORDER — ONDANSETRON 2 MG/ML
4 INJECTION INTRAMUSCULAR; INTRAVENOUS ONCE
Status: COMPLETED | OUTPATIENT
Start: 2024-10-12 | End: 2024-10-12

## 2024-10-12 RX ORDER — SODIUM CHLORIDE 9 MG/ML
3 INJECTION INTRAVENOUS
Status: DISCONTINUED | OUTPATIENT
Start: 2024-10-12 | End: 2024-10-14 | Stop reason: HOSPADM

## 2024-10-12 RX ORDER — HEPARIN SODIUM 10000 [USP'U]/100ML
3-20 INJECTION, SOLUTION INTRAVENOUS
Status: DISCONTINUED | OUTPATIENT
Start: 2024-10-12 | End: 2024-10-13

## 2024-10-12 RX ORDER — SODIUM CHLORIDE 9 MG/ML
100 INJECTION, SOLUTION INTRAVENOUS CONTINUOUS
Status: DISPENSED | OUTPATIENT
Start: 2024-10-12 | End: 2024-10-13

## 2024-10-12 RX ORDER — ONDANSETRON 2 MG/ML
4 INJECTION INTRAMUSCULAR; INTRAVENOUS ONCE AS NEEDED
Status: COMPLETED | OUTPATIENT
Start: 2024-10-12 | End: 2024-10-13

## 2024-10-12 RX ORDER — LIDOCAINE HYDROCHLORIDE 10 MG/ML
INJECTION, SOLUTION EPIDURAL; INFILTRATION; INTRACAUDAL; PERINEURAL CODE/TRAUMA/SEDATION MEDICATION
Status: DISCONTINUED | OUTPATIENT
Start: 2024-10-12 | End: 2024-10-12 | Stop reason: HOSPADM

## 2024-10-12 RX ORDER — NITROGLYCERIN 0.4 MG/1
0.4 TABLET SUBLINGUAL ONCE AS NEEDED
Status: DISCONTINUED | OUTPATIENT
Start: 2024-10-12 | End: 2024-10-14 | Stop reason: HOSPADM

## 2024-10-12 RX ORDER — ACETAMINOPHEN 325 MG/1
975 TABLET ORAL ONCE AS NEEDED
Status: COMPLETED | OUTPATIENT
Start: 2024-10-12 | End: 2024-10-13

## 2024-10-12 RX ORDER — VERAPAMIL HYDROCHLORIDE 2.5 MG/ML
INJECTION, SOLUTION INTRAVENOUS CODE/TRAUMA/SEDATION MEDICATION
Status: DISCONTINUED | OUTPATIENT
Start: 2024-10-12 | End: 2024-10-12 | Stop reason: HOSPADM

## 2024-10-12 RX ORDER — HEPARIN SODIUM 1000 [USP'U]/ML
4000 INJECTION, SOLUTION INTRAVENOUS; SUBCUTANEOUS ONCE
Status: DISCONTINUED | OUTPATIENT
Start: 2024-10-12 | End: 2024-10-12 | Stop reason: SDUPTHER

## 2024-10-12 RX ADMIN — HEPARIN SODIUM 12 UNITS/KG/HR: 10000 INJECTION, SOLUTION INTRAVENOUS at 21:13

## 2024-10-12 RX ADMIN — FENTANYL CITRATE 50 MCG: 50 INJECTION, SOLUTION INTRAMUSCULAR; INTRAVENOUS at 21:15

## 2024-10-12 RX ADMIN — ONDANSETRON 4 MG: 2 INJECTION INTRAMUSCULAR; INTRAVENOUS at 21:16

## 2024-10-12 RX ADMIN — TICAGRELOR 180 MG: 90 TABLET ORAL at 21:09

## 2024-10-12 RX ADMIN — HEPARIN SODIUM 4000 UNITS: 1000 INJECTION INTRAVENOUS; SUBCUTANEOUS at 21:10

## 2024-10-12 RX ADMIN — SODIUM CHLORIDE 100 ML/HR: 0.9 INJECTION, SOLUTION INTRAVENOUS at 23:20

## 2024-10-13 ENCOUNTER — APPOINTMENT (OUTPATIENT)
Dept: NON INVASIVE DIAGNOSTICS | Facility: HOSPITAL | Age: 64
DRG: 322 | End: 2024-10-13
Payer: COMMERCIAL

## 2024-10-13 PROBLEM — Z72.0 TOBACCO ABUSE: Chronic | Status: ACTIVE | Noted: 2024-10-13

## 2024-10-13 LAB
4HR DELTA HS TROPONIN: 4274 NG/L
ANION GAP SERPL CALCULATED.3IONS-SCNC: 5 MMOL/L (ref 4–13)
AORTIC ROOT: 3.4 CM
AORTIC VALVE MEAN VELOCITY: 7.3 M/S
APICAL FOUR CHAMBER EJECTION FRACTION: 50 %
ASCENDING AORTA: 3.6 CM
ATRIAL RATE: 46 BPM
ATRIAL RATE: 47 BPM
ATRIAL RATE: 49 BPM
ATRIAL RATE: 51 BPM
AV AREA BY CONTINUOUS VTI: 2.9 CM2
AV AREA PEAK VELOCITY: 3.1 CM2
AV LVOT MEAN GRADIENT: 1 MMHG
AV LVOT PEAK GRADIENT: 3 MMHG
AV MEAN GRADIENT: 2 MMHG
AV PEAK GRADIENT: 5 MMHG
AV VALVE AREA: 2.91 CM2
AV VELOCITY RATIO: 0.74
BASOPHILS # BLD AUTO: 0.04 THOUSANDS/ΜL (ref 0–0.1)
BASOPHILS NFR BLD AUTO: 1 % (ref 0–1)
BSA FOR ECHO PROCEDURE: 2.1 M2
BUN SERPL-MCNC: 13 MG/DL (ref 5–25)
CALCIUM SERPL-MCNC: 9 MG/DL (ref 8.4–10.2)
CARDIAC TROPONIN I PNL SERPL HS: 4378 NG/L
CHLORIDE SERPL-SCNC: 107 MMOL/L (ref 96–108)
CHOLEST SERPL-MCNC: 180 MG/DL
CO2 SERPL-SCNC: 27 MMOL/L (ref 21–32)
CREAT SERPL-MCNC: 0.9 MG/DL (ref 0.6–1.3)
DOP CALC AO PEAK VEL: 1.13 M/S
DOP CALC AO VTI: 27 CM
DOP CALC LVOT AREA: 4.15 CM2
DOP CALC LVOT CARDIAC INDEX: 1.79 L/MIN/M2
DOP CALC LVOT CARDIAC OUTPUT: 3.78 L/MIN
DOP CALC LVOT DIAMETER: 2.3 CM
DOP CALC LVOT PEAK VEL VTI: 18.91 CM
DOP CALC LVOT PEAK VEL: 0.84 M/S
DOP CALC LVOT STROKE INDEX: 35.5 ML/M2
DOP CALC LVOT STROKE VOLUME: 78.53
E WAVE DECELERATION TIME: 216 MS
E/A RATIO: 0.85
EOSINOPHIL # BLD AUTO: 0.05 THOUSAND/ΜL (ref 0–0.61)
EOSINOPHIL NFR BLD AUTO: 1 % (ref 0–6)
ERYTHROCYTE [DISTWIDTH] IN BLOOD BY AUTOMATED COUNT: 11.9 % (ref 11.6–15.1)
EST. AVERAGE GLUCOSE BLD GHB EST-MCNC: 117 MG/DL
FRACTIONAL SHORTENING: 22 (ref 28–44)
GFR SERPL CREATININE-BSD FRML MDRD: 89 ML/MIN/1.73SQ M
GLUCOSE SERPL-MCNC: 103 MG/DL (ref 65–140)
HBA1C MFR BLD: 5.7 %
HCT VFR BLD AUTO: 42.5 % (ref 36.5–49.3)
HDLC SERPL-MCNC: 32 MG/DL
HGB BLD-MCNC: 15.1 G/DL (ref 12–17)
IMM GRANULOCYTES # BLD AUTO: 0.03 THOUSAND/UL (ref 0–0.2)
IMM GRANULOCYTES NFR BLD AUTO: 0 % (ref 0–2)
INTERVENTRICULAR SEPTUM IN DIASTOLE (PARASTERNAL SHORT AXIS VIEW): 1.2 CM
INTERVENTRICULAR SEPTUM: 1.2 CM (ref 0.6–1.1)
LAAS-AP2: 25.1 CM2
LAAS-AP4: 26.1 CM2
LDLC SERPL CALC-MCNC: 81 MG/DL (ref 0–100)
LEFT ATRIUM SIZE: 3.7 CM
LEFT ATRIUM VOLUME (MOD BIPLANE): 87 ML
LEFT ATRIUM VOLUME INDEX (MOD BIPLANE): 41.2 ML/M2
LEFT INTERNAL DIMENSION IN SYSTOLE: 4.2 CM (ref 2.1–4)
LEFT VENTRICLE DIASTOLIC VOLUME (MOD BIPLANE): 143 ML
LEFT VENTRICLE DIASTOLIC VOLUME INDEX (MOD BIPLANE): 68.1 ML/M2
LEFT VENTRICLE SYSTOLIC VOLUME (MOD BIPLANE): 71 ML
LEFT VENTRICLE SYSTOLIC VOLUME INDEX (MOD BIPLANE): 33.8 ML/M2
LEFT VENTRICULAR INTERNAL DIMENSION IN DIASTOLE: 5.4 CM (ref 3.5–6)
LEFT VENTRICULAR POSTERIOR WALL IN END DIASTOLE: 1.2 CM
LEFT VENTRICULAR STROKE VOLUME: 59 ML
LV EF: 51 %
LVSV (TEICH): 59 ML
LYMPHOCYTES # BLD AUTO: 1.96 THOUSANDS/ΜL (ref 0.6–4.47)
LYMPHOCYTES NFR BLD AUTO: 23 % (ref 14–44)
MCH RBC QN AUTO: 32.1 PG (ref 26.8–34.3)
MCHC RBC AUTO-ENTMCNC: 35.5 G/DL (ref 31.4–37.4)
MCV RBC AUTO: 90 FL (ref 82–98)
MONOCYTES # BLD AUTO: 0.69 THOUSAND/ΜL (ref 0.17–1.22)
MONOCYTES NFR BLD AUTO: 8 % (ref 4–12)
MV E'TISSUE VEL-LAT: 11 CM/S
MV E'TISSUE VEL-SEP: 6 CM/S
MV PEAK A VEL: 0.55 M/S
MV PEAK E VEL: 47 CM/S
NEUTROPHILS # BLD AUTO: 5.94 THOUSANDS/ΜL (ref 1.85–7.62)
NEUTS SEG NFR BLD AUTO: 67 % (ref 43–75)
NONHDLC SERPL-MCNC: 148 MG/DL
NRBC BLD AUTO-RTO: 0 /100 WBCS
P AXIS: 57 DEGREES
P AXIS: 66 DEGREES
P AXIS: 66 DEGREES
P AXIS: 75 DEGREES
PLATELET # BLD AUTO: 266 THOUSANDS/UL (ref 149–390)
PMV BLD AUTO: 9.1 FL (ref 8.9–12.7)
POTASSIUM SERPL-SCNC: 4.1 MMOL/L (ref 3.5–5.3)
PR INTERVAL: 158 MS
PR INTERVAL: 164 MS
PR INTERVAL: 168 MS
QRS AXIS: -3 DEGREES
QRS AXIS: 49 DEGREES
QRS AXIS: 54 DEGREES
QRS AXIS: 54 DEGREES
QRSD INTERVAL: 100 MS
QRSD INTERVAL: 106 MS
QRSD INTERVAL: 106 MS
QRSD INTERVAL: 98 MS
QT INTERVAL: 438 MS
QT INTERVAL: 442 MS
QT INTERVAL: 470 MS
QT INTERVAL: 478 MS
QTC INTERVAL: 386 MS
QTC INTERVAL: 403 MS
QTC INTERVAL: 423 MS
QTC INTERVAL: 424 MS
RA PRESSURE ESTIMATED: 5 MMHG
RBC # BLD AUTO: 4.71 MILLION/UL (ref 3.88–5.62)
RIGHT ATRIUM AREA SYSTOLE A4C: 18.7 CM2
RIGHT VENTRICLE ID DIMENSION: 4.7 CM
RV PSP: 33 MMHG
SINOTUBULAR JUNCTION: 2.9 CM
SL CV LEFT ATRIUM LENGTH A2C: 6.1 CM
SL CV LV EF: 48
SL CV PED ECHO LEFT VENTRICLE DIASTOLIC VOLUME (MOD BIPLANE) 2D: 138 ML
SL CV PED ECHO LEFT VENTRICLE SYSTOLIC VOLUME (MOD BIPLANE) 2D: 79 ML
SL CV SINUS OF VALSALVA 2D: 3.4 CM
SODIUM SERPL-SCNC: 139 MMOL/L (ref 135–147)
STJ: 2.9 CM
T WAVE AXIS: 77 DEGREES
T WAVE AXIS: 78 DEGREES
T WAVE AXIS: 79 DEGREES
T WAVE AXIS: 81 DEGREES
TR MAX PG: 28 MMHG
TR PEAK VELOCITY: 2.6 M/S
TRICUSPID ANNULAR PLANE SYSTOLIC EXCURSION: 2.2 CM
TRICUSPID VALVE PEAK REGURGITATION VELOCITY: 2.64 M/S
TRIGL SERPL-MCNC: 334 MG/DL
VENTRICULAR RATE: 46 BPM
VENTRICULAR RATE: 47 BPM
VENTRICULAR RATE: 49 BPM
VENTRICULAR RATE: 51 BPM
WBC # BLD AUTO: 8.71 THOUSAND/UL (ref 4.31–10.16)

## 2024-10-13 PROCEDURE — 80048 BASIC METABOLIC PNL TOTAL CA: CPT | Performed by: NURSE PRACTITIONER

## 2024-10-13 PROCEDURE — 93306 TTE W/DOPPLER COMPLETE: CPT

## 2024-10-13 PROCEDURE — 99232 SBSQ HOSP IP/OBS MODERATE 35: CPT | Performed by: STUDENT IN AN ORGANIZED HEALTH CARE EDUCATION/TRAINING PROGRAM

## 2024-10-13 PROCEDURE — 85025 COMPLETE CBC W/AUTO DIFF WBC: CPT | Performed by: NURSE PRACTITIONER

## 2024-10-13 PROCEDURE — 80061 LIPID PANEL: CPT | Performed by: NURSE PRACTITIONER

## 2024-10-13 PROCEDURE — 99232 SBSQ HOSP IP/OBS MODERATE 35: CPT | Performed by: INTERNAL MEDICINE

## 2024-10-13 PROCEDURE — 93010 ELECTROCARDIOGRAM REPORT: CPT | Performed by: INTERNAL MEDICINE

## 2024-10-13 PROCEDURE — NC001 PR NO CHARGE: Performed by: STUDENT IN AN ORGANIZED HEALTH CARE EDUCATION/TRAINING PROGRAM

## 2024-10-13 PROCEDURE — 83036 HEMOGLOBIN GLYCOSYLATED A1C: CPT | Performed by: NURSE PRACTITIONER

## 2024-10-13 PROCEDURE — 84484 ASSAY OF TROPONIN QUANT: CPT | Performed by: EMERGENCY MEDICINE

## 2024-10-13 PROCEDURE — 93306 TTE W/DOPPLER COMPLETE: CPT | Performed by: STUDENT IN AN ORGANIZED HEALTH CARE EDUCATION/TRAINING PROGRAM

## 2024-10-13 RX ORDER — ACETAMINOPHEN 325 MG/1
975 TABLET ORAL EVERY 6 HOURS PRN
Status: DISCONTINUED | OUTPATIENT
Start: 2024-10-13 | End: 2024-10-14 | Stop reason: HOSPADM

## 2024-10-13 RX ORDER — LISINOPRIL 5 MG/1
5 TABLET ORAL DAILY
Status: DISCONTINUED | OUTPATIENT
Start: 2024-10-13 | End: 2024-10-14 | Stop reason: HOSPADM

## 2024-10-13 RX ADMIN — TICAGRELOR 90 MG: 90 TABLET ORAL at 08:33

## 2024-10-13 RX ADMIN — LISINOPRIL 5 MG: 5 TABLET ORAL at 13:06

## 2024-10-13 RX ADMIN — ASPIRIN 81 MG: 81 TABLET, COATED ORAL at 08:33

## 2024-10-13 RX ADMIN — ACETAMINOPHEN 975 MG: 325 TABLET, FILM COATED ORAL at 13:06

## 2024-10-13 RX ADMIN — ATORVASTATIN CALCIUM 80 MG: 80 TABLET, FILM COATED ORAL at 16:21

## 2024-10-13 RX ADMIN — ACETAMINOPHEN 975 MG: 325 TABLET, FILM COATED ORAL at 05:17

## 2024-10-13 RX ADMIN — ACETAMINOPHEN 975 MG: 325 TABLET, FILM COATED ORAL at 19:50

## 2024-10-13 RX ADMIN — ONDANSETRON 4 MG: 2 INJECTION INTRAMUSCULAR; INTRAVENOUS at 11:15

## 2024-10-13 RX ADMIN — TICAGRELOR 90 MG: 90 TABLET ORAL at 19:50

## 2024-10-13 NOTE — PLAN OF CARE
Problem: PAIN - ADULT  Goal: Verbalizes/displays adequate comfort level or baseline comfort level  Description: Interventions:  - Encourage patient to monitor pain and request assistance  - Assess pain using appropriate pain scale  - Administer analgesics based on type and severity of pain and evaluate response  - Implement non-pharmacological measures as appropriate and evaluate response  - Consider cultural and social influences on pain and pain management  - Notify physician/advanced practitioner if interventions unsuccessful or patient reports new pain  Outcome: Progressing     Problem: INFECTION - ADULT  Goal: Absence or prevention of progression during hospitalization  Description: INTERVENTIONS:  - Assess and monitor for signs and symptoms of infection  - Monitor lab/diagnostic results  - Monitor all insertion sites, i.e. indwelling lines, tubes, and drains  - Monitor endotracheal if appropriate and nasal secretions for changes in amount and color  - [place] appropriate cooling/warming therapies per order  - Administer medications as ordered  - Instruct and encourage patient and family to use good hand hygiene technique  - Identify and instruct in appropriate isolation precautions for identified infection/condition  Outcome: Progressing  Goal: Absence of fever/infection during neutropenic period  Description: INTERVENTIONS:  - Monitor WBC    Outcome: Progressing     Problem: SAFETY ADULT  Goal: Patient will remain free of falls  Description: INTERVENTIONS:  - Educate patient/family on patient safety including physical limitations  - Instruct patient to call for assistance with activity   - Consult OT/PT to assist with strengthening/mobility   - Keep Call bell within reach  - Keep bed low and locked with side rails adjusted as appropriate  - Keep care items and personal belongings within reach  - Initiate and maintain comfort rounds  - Make Fall Risk Sign visible to staff  - Offer Toileting   - Apply  yellow socks and bracelet for high fall risk patients  - Consider moving patient to room near nurses station  Outcome: Progressing  Goal: Maintain or return to baseline ADL function  Description: INTERVENTIONS:  -  Assess patient's ability to carry out ADLs; assess patient's baseline for ADL function and identify physical deficits which impact ability to perform ADLs (bathing, care of mouth/teeth, toileting, grooming, dressing, etc.)  - Assess/evaluate cause of self-care deficits   - Assess range of motion  - Assess patient's mobility; develop plan if impaired  - Assess patient's need for assistive devices and provide as appropriate  - Encourage maximum independence but intervene and supervise when necessary  - Involve family in performance of ADLs  - Assess for home care needs following discharge   - Consider OT consult to assist with ADL evaluation and planning for discharge  - Provide patient education as appropriate  Outcome: Progressing  Goal: Maintains/Returns to pre admission functional level  Description: INTERVENTIONS:  - Perform AM-PAC 6 Click Basic Mobility/ Daily Activity assessment daily.  - Set and communicate daily mobility goal to care team and patient/family/caregiver.   - Collaborate with rehabilitation services on mobility goals if consulted  - Perform Range of Motion   - Out of bed for toileting  - Record patient progress and toleration of activity level   Outcome: Progressing     Problem: DISCHARGE PLANNING  Goal: Discharge to home or other facility with appropriate resources  Description: INTERVENTIONS:  - Identify barriers to discharge w/patient and caregiver  - Arrange for needed discharge resources and transportation as appropriate  - Identify discharge learning needs (meds, wound care, etc.)  - Arrange for interpretive services to assist at discharge as needed  - Refer to Case Management Department for coordinating discharge planning if the patient needs post-hospital services based on  physician/advanced practitioner order or complex needs related to functional status, cognitive ability, or social support system  Outcome: Progressing     Problem: Knowledge Deficit  Goal: Patient/family/caregiver demonstrates understanding of disease process, treatment plan, medications, and discharge instructions  Description: Complete learning assessment and assess knowledge base.  Interventions:  - Provide teaching at level of understanding  - Provide teaching via preferred learning methods  Outcome: Progressing     Problem: Nutrition/Hydration-ADULT  Goal: Nutrient/Hydration intake appropriate for improving, restoring or maintaining nutritional needs  Description: Monitor and assess patient's nutrition/hydration status for malnutrition. Collaborate with interdisciplinary team and initiate plan and interventions as ordered.  Monitor patient's weight and dietary intake as ordered or per policy. Utilize nutrition screening tool and intervene as necessary. Determine patient's food preferences and provide high-protein, high-caloric foods as appropriate.     INTERVENTIONS:  - Monitor oral intake, urinary output, labs, and treatment plans  - Assess nutrition and hydration status and recommend course of action  - Evaluate amount of meals eaten  - Assist patient with eating if necessary   - Allow adequate time for meals  - Recommend/ encourage appropriate diets, oral nutritional supplements, and vitamin/mineral supplements  - Order, calculate, and assess calorie counts as needed  - Recommend, monitor, and adjust tube feedings and TPN/PPN based on assessed needs  - Assess need for intravenous fluids  - Provide specific nutrition/hydration education as appropriate  - Include patient/family/caregiver in decisions related to nutrition  Outcome: Progressing     Problem: COPING  Goal: Pt/Family able to verbalize concerns and demonstrate effective coping strategies  Description: INTERVENTIONS:  - Assist patient/family to  identify coping skills, available support systems and cultural and spiritual values  - Provide emotional support, including active listening and acknowledgement of concerns of patient and caregivers  - Reduce environmental stimuli, as able  - Provide patient education  - Assess for spiritual pain/suffering and initiate spiritual care, including notification of Pastoral Care or [place] based community as needed  - Assess effectiveness of coping strategies  Outcome: Progressing  Goal: Will report anxiety at manageable levels  Description: INTERVENTIONS:  - Administer medication as ordered  - Teach and encourage coping skills  - Provide emotional support  - Assess patient/family for anxiety and ability to cope  Outcome: Progressing

## 2024-10-13 NOTE — ASSESSMENT & PLAN NOTE
Obstetric HPI:  Patient reports Frequency: Every 5-7 minutes contractions, active fetal movement, No vaginal bleeding , No loss of fluid     OB History    Para Term  AB Living   2 1 1 0 0 1   SAB IAB Ectopic Multiple Live Births   0 0 0 0 1      # Outcome Date GA Lbr Pablo/2nd Weight Sex Delivery Anes PTL Lv   2 Current            1 Term 18 40w1d  2.61 kg (5 lb 12.1 oz) M Vag-Spont EPI N BRICE      Name: LAVELLE VALENCIA      Apgar1: 8  Apgar5: 9     Past Medical History:   Diagnosis Date    Ovarian cyst 10/01/2020     History reviewed. No pertinent surgical history.    PTA Medications   Medication Sig    prenatal vit no.124-iron-folic 27 mg iron- 800 mcg Tab Take 1 tablet by mouth once daily.       Review of patient's allergies indicates:   Allergen Reactions    Novocain [procaine] Swelling        Family History       Problem Relation (Age of Onset)    Diabetes Mother    Hypertension Father          Tobacco Use    Smoking status: Every Day     Packs/day: 0.25     Types: Cigarettes    Smokeless tobacco: Never   Substance and Sexual Activity    Alcohol use: Yes     Comment: socially    Drug use: Yes     Types: Marijuana     Comment: daily    Sexual activity: Yes     Partners: Male     Review of Systems   Constitutional:  Negative for chills and fever.   Eyes:  Negative for visual disturbance.   Respiratory:  Negative for cough and wheezing.    Cardiovascular:  Negative for chest pain and palpitations.   Gastrointestinal:  Negative for abdominal pain, nausea and vomiting.   Genitourinary:  Negative for dysuria, frequency, hematuria, pelvic pain, vaginal bleeding, vaginal discharge and vaginal pain.   Neurological:  Negative for headaches.   Psychiatric/Behavioral:  Negative for depression.     Objective:     Vital Signs (Most Recent):  Temp: 98.1 °F (36.7 °C) (22 0736)  Pulse: (!) 138 (22 1555)  Resp: 16 (22)  BP: (!) 146/79 (22 1555)  SpO2: 100 % (22 155)   Vital  Presented to the ED with chest pain, found to have a posterior STEMI, underwent cardiac catherization with balloon angioplasty and SUKHI placement to the Ramus  Admitted to the ICU post procedure for monitoring    Plan:  Continuous cardiac monitoring  DAPT per cardiology recommendations  GDMT as able with consideration for BP  Continue statin   Signs (24h Range):  Temp:  [98.1 °F (36.7 °C)] 98.1 °F (36.7 °C)  Pulse:  [] 138  Resp:  [16] 16  SpO2:  [93 %-100 %] 100 %  BP: (123-172)/() 146/79     Weight: 98.1 kg (216 lb 4.3 oz)  Body mass index is 39.56 kg/m².    FHT: Cat 1 (reassuring) 145 bpm, moderate BTBV, +accelerations  TOCO:  Q 5-7 minutes    Physical Exam:   Constitutional: She is oriented to person, place, and time. She appears well-developed and well-nourished.       Cardiovascular:  Normal rate.             Pulmonary/Chest: Effort normal. No respiratory distress.        Abdominal: Soft. She exhibits no distension and no abdominal incision.                 Neurological: She is alert and oriented to person, place, and time.    Skin: Skin is warm and dry.    Psychiatric: She has a normal mood and affect.     Cervix:  Dilation:  4  Effacement:  50%  Station: -3  Presentation: Vertex     Significant Labs:  Lab Results   Component Value Date    GROUPTRH O POS 09/09/2022    HEPBSAG Negative 01/24/2022    STREPBCULT (A) 08/11/2022     STREPTOCOCCUS AGALACTIAE (GROUP B)  In case of Penicillin allergy, call lab for further testing.  Beta-hemolytic streptococci are routinely susceptible to   penicillins,cephalosporins and carbapenems.         CBC:   Recent Labs   Lab 09/09/22  0613   WBC 11.89   RBC 4.55   HGB 12.2   HCT 35.4*      MCV 78*   MCH 26.8*   MCHC 34.5     I have personallly reviewed all pertinent lab results from the last 24 hours.

## 2024-10-13 NOTE — PROGRESS NOTES
Progress Note - Cardiology   Name: Richardson Paez 64 y.o. male I MRN: [id number]  Unit/Bed#: ICU 05 I Date of Admission: 10/12/2024   Date of Service: 10/13/2024 I Hospital Day: 0     Assessment & Plan  STEMI (ST elevation myocardial infarction) (HCC)  - Initial EKG showed ST elevations in the inferior leads  - [facility] 10/12/2024 showed 99% ramus occlusion, status post PCI with SUKHI x 1, OM1 50%, moderate residual disease in the LCx and RCA  - Continue with aspirin 81 mg daily, Brilinta 90 mg twice daily and atorvastatin 80 mg daily  - Pending TTE  Coronary artery disease involving native coronary artery of native heart without angina pectoris  - History of CAD with prior stenting  -Patient reports having a heart attack with stents placed at [facility] on 4/12/2012, unknown vessel  Pure hypercholesterolemia  - Outpatient Rx atorvastatin 40 mg daily  - Continue with atorvastatin 80 mg daily  - Lipid profile 10/13/2024 showed total cholesterol 180, triglycerides 334, HDL 32, LDL 81  Essential hypertension  - Outpatient Rx Toprol-XL 25 mg daily, currently holding due to bradycardia  Tobacco abuse  -Former smoker, quit smoking after first MI in April 2012    Summary:  - Status post PCI with SUKHI x 1 to the ramus yesterday  - Continue with aspirin 81 mg daily, Brilinta 90 mg twice daily and atorvastatin 80 mg daily  - Pending TTE  - Price check Brilinta  - Start lisinopril 5 mg daily  - Holding metoprolol due to bradycardia  - Cardiac rehab referral on discharge  - If no significant events overnight, he should be stable from a cardiac standpoint for discharge home tomorrow      Subjective:   Patient presented with a STEMI yesterday afternoon, status post PCI with SUKHI x 1 to the ramus.  He reports feeling well this morning and has no complaints.  Remains chest pain-free.  Does acknowledge little bit of nausea this morning after eating breakfast.  Denies shortness of breath, orthopnea, abdominal pain, vomiting, fever,  "chills, headache, dizziness or palpitations.    Objective:     Vitals: Blood pressure 148/90, pulse (!) 53, temperature 98.1 °F (36.7 °C), temperature source Oral, resp. rate 17, height 6' 3\" (1.905 m), weight 82.1 kg (181 lb), SpO2 97%., Body mass index is 22.62 kg/m².,   Orthostatic Blood Pressures      Flowsheet Row Most Recent Value   Blood Pressure 148/90 filed at 10/13/2024 1100   Patient Position - Orthostatic VS Lying filed at 10/13/2024 0600              Intake/Output Summary (Last 24 hours) at 10/13/2024 1104  Last data filed at 10/13/2024 0600  Gross per 24 hour   Intake 367.67 ml   Output 350 ml   Net 17.67 ml           Physical Exam:    GEN: Richardson Paez appears well, alert and oriented x 3, pleasant and cooperative   HEENT: Mucous membranes moist, no scleral icterus, no conjunctival pallor  NECK: No elevated JVD  HEART: Regular rate and rhythm, normal S1 and S2, no murmurs or rubs   LUNGS: clear to auscultation bilaterally; no wheezes, rales, or rhonchi   ABDOMEN: normal bowel sounds, soft, no tenderness, no distention  EXTREMITIES: peripheral pulses normal; no lower extremity edema   NEURO: no focal findings   SKIN: No lesions or rashes on exposed skin        Current Facility-Administered Medications:     aspirin (ECOTRIN LOW STRENGTH) EC tablet 81 mg, 81 mg, Oral, Daily, Rayne Miller DO, 81 mg at 10/13/24 0833    atorvastatin (LIPITOR) tablet 80 mg, 80 mg, Oral, Daily With Dinner, Rayne Miller DO    nitroglycerin (NITROSTAT) SL tablet 0.4 mg, 0.4 mg, Sublingual, Once PRN, Rayne Miller DO    ondansetron (ZOFRAN) injection 4 mg, 4 mg, Intravenous, Once PRN, Rayne Miller DO    Insert peripheral IV, , , Once **AND** [Transfer Hold] sodium chloride (PF) 0.9 % injection 3 mL, 3 mL, Intravenous, Q1H PRN, Regina Johnson DO    sodium chloride 0.9 % infusion, 75 mL/hr, Intravenous, Continuous, Regina Johnson DO    [COMPLETED] ticagrelor (BRILINTA) tablet 180 mg, 180 mg, Oral, " "Once, 180 mg at 10/12/24 2109 **AND** [Transfer Hold] ticagrelor (BRILINTA) tablet 90 mg, 90 mg, Oral, Q12H GEOFF, Regina Johnson DO    ticagrelor (BRILINTA) tablet 90 mg, 90 mg, Oral, Q12H [facility], Rayne Miller DO, 90 mg at 10/13/24 0833    Labs & Results:    No results found for: \"CKTOTAL\", \"CKMB\", \"CKMBINDEX\", \"TROPONINI\"    Lab Results   Component Value Date    GLUCOSE 93 12/03/2014    CALCIUM 9.0 10/13/2024     12/03/2014    K 4.1 10/13/2024    CO2 27 10/13/2024     10/13/2024    BUN 13 10/13/2024    CREATININE 0.90 10/13/2024       Lab Results   Component Value Date    WBC 8.71 10/13/2024    HGB 15.1 10/13/2024    HCT 42.5 10/13/2024    MCV 90 10/13/2024     10/13/2024     Results from last 7 days   Lab Units 10/12/24  2112   INR  1.02       Lab Results   Component Value Date    CHOL 143 07/18/2017    CHOL 122 (L) 10/11/2013     Lab Results   Component Value Date    HDL 32 (L) 10/13/2024    HDL 36 (L) 10/12/2024     Lab Results   Component Value Date    LDLCALC 81 10/13/2024    LDLCALC 113 (H) 10/12/2024     Lab Results   Component Value Date    TRIG 334 (H) 10/13/2024    TRIG 271 (H) 10/12/2024       Lab Results   Component Value Date    ALT 22 06/26/2023    AST 18 06/26/2023    ALKPHOS 94 06/26/2023         EKG personally reviewed by )Arjun Barton MD. No acute changes   TELE: No significant arrhythmias seen on telemetry review.           "

## 2024-10-13 NOTE — ASSESSMENT & PLAN NOTE
Previous hx of MI with stent placement  Presented with STEMI, s/p cardiac cath and stent placement  See above for further recommendations

## 2024-10-13 NOTE — ASSESSMENT & PLAN NOTE
Presented to the ED with chest pain, found to have a posterior STEMI, underwent cardiac catherization with balloon angioplasty and SUKHI placement to the Ramus  Admitted to the ICU post procedure for monitoring    Plan:  Continuous cardiac monitoring  DAPT per cardiology recommendations  GDMT as able with consideration for BP  Continue statin  ECHO is pending

## 2024-10-13 NOTE — ASSESSMENT & PLAN NOTE
- History of CAD with prior stenting  -Patient reports having a heart attack with stents placed at [facility] on 4/12/2012, unknown vessel

## 2024-10-13 NOTE — ASSESSMENT & PLAN NOTE
- Outpatient Rx atorvastatin 40 mg daily  - Continue with atorvastatin 80 mg daily  - Lipid profile 10/13/2024 showed total cholesterol 180, triglycerides 334, HDL 32, LDL 81

## 2024-10-13 NOTE — ED PROVIDER NOTES
Time reflects when diagnosis was documented in both MDM as applicable and the Disposition within this note       Time User Action Codes Description Comment    10/12/2024  9:05 PM Regina Johnson Add [I21.3] STEMI (ST elevation myocardial infarction) (HCC)           ED Disposition       ED Disposition   Send to Cath Lab    Condition   --    Date/Time   Sat Oct 12, 2024  9:49 PM    Comment   --             Assessment & Plan       Medical Decision Making  A 64-year-old male presents with chest pain and nausea.  Reviewed prehospital EKGs.  Initial EKG's on arrival to the ED consistent with acute inferior and posterior wall STEMI.  Will send initial cardiac labs.  Will discuss with interventional cardiology.    Amount and/or Complexity of Data Reviewed  Labs: ordered.  Radiology: ordered.    Risk  Prescription drug management.  Decision regarding hospitalization.        ED Course as of 10/12/24 2211   Sat Oct 12, 2024   2106 Spoke with Dr. Miller, cardiology, reviewing pt's history, presentation and work up.   Plan to proceed with cardiac cath emergently.  Requests brilinta and heparin loads with heparin gtt.       Medications   sodium chloride (PF) 0.9 % injection 3 mL (has no administration in time range)   sodium chloride 0.9 % infusion (has no administration in time range)   ticagrelor (BRILINTA) tablet 180 mg (180 mg Oral Given 10/12/24 2109)     And   ticagrelor (BRILINTA) tablet 90 mg (has no administration in time range)   heparin (porcine) 25,000 units in 0.45% NaCl 250 mL infusion (premix) (12 Units/kg/hr × 80 kg (Order-Specific) Intravenous New Bag 10/12/24 2113)   heparin (porcine) injection 4,000 Units (has no administration in time range)   heparin (porcine) injection 2,000 Units (has no administration in time range)   atropine 1 mg/10 mL injection **ADS Override Pull** (has no administration in time range)   heparin (porcine) injection 4,000 Units (4,000 Units Intravenous Given 10/12/24 2110)   fentaNYL  injection 50 mcg (50 mcg Intravenous Given 10/12/24 2115)   ondansetron (ZOFRAN) injection 4 mg (4 mg Intravenous Given 10/12/24 2116)       ED Risk Strat Scores                                               History of Present Illness       Chief Complaint   Patient presents with    Chest Pain     Coming in from home, via EMS, 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 No pertinent past medical history.   Past Surgical History:   Procedure Laterality Date    APPENDECTOMY      CORONARY ANGIOPLASTY WITH STENT PLACEMENT      HERNIA REPAIR        Family History   Problem Relation Age of Onset    Heart disease Mother     Heart disease Father       Social History     Tobacco Use    Smoking status: Former     Current packs/day: 0.00     Types: Cigarettes     Quit date:      Years since quittin.    Smokeless tobacco: Never   Substance Use Topics    Alcohol use: Yes     Comment: social      E-Cigarette/Vaping      E-Cigarette/Vaping Substances      I have reviewed and agree with the history as documented.     A 63 yo male with pmhx of CAD s/p remote stent; BIBA for chest pain that began around 6 pm this evening.  Chest pain has been constant since onset.  It is nonradiating.  He denies associated shortness of breath.  He does report having intermittent nausea and an upset stomach since yesterday.  Patient has otherwise not had fever, chills, cough, abdominal pain, vomiting, diarrhea, peripheral edema and rashes.  Patient did receive aspirin and nitro prehospital, reporting some improvement in his symptoms although reports ongoing chest pain.      History provided by:  Patient, medical records and EMS personnel      Review of Systems   Cardiovascular:  Positive for chest pain.   Gastrointestinal:  Positive for nausea.   All other systems reviewed and are  negative.      Objective       ED Triage Vitals [10/12/24 2057]   Temperature Pulse Blood Pressure Respirations SpO2 Patient Position - Orthostatic VS   (!) 97.4 °F (36.3 °C) 56 141/93 20 100 % Sitting      Temp Source Heart Rate Source BP Location FiO2 (%) Pain Score    Oral Monitor Right arm -- 6      Vitals      Date and Time Temp Pulse SpO2 Resp BP Pain Score FACES Pain Rating User   10/12/24 2149 -- -- 100 % -- -- -- -- JS   10/12/24 2134 -- 56 100 % 20 118/92 -- -- EC   10/12/24 2132 -- 69 99 % 20 117/91 -- -- EC   10/12/24 2122 -- 47 100 % 20 144/95 -- -- EC   10/12/24 2117 -- 51 100 % 20 144/92 -- -- EC   10/12/24 2115 -- -- -- -- -- 6 -- EC   10/12/24 2114 -- 48 100 % 20 143/94 -- -- EC   10/12/24 2057 97.4 °F (36.3 °C) 56 100 % 20 141/93 6 -- EC            Physical Exam  General Appearance: alert and oriented, ill-appearing  Skin:  Warm, diaphoretic, intact.  No cyanosis  HEENT: Atraumatic, normocephalic.  No eye drainage.  Normal hearing.  Moist mucous membranes.    Neck: Supple, trachea midline  Cardiac: RRR; no murmurs, rub, gallops.  No pedal edema, 2+ pulses  Pulmonary: lungs CTAB; no wheezes, rales, rhonchi  Gastrointestinal: abdomen soft, nontender, nondistended; no guarding or rebound tenderness; good bowel sounds, no mass or bruits  Extremities:  No deformities.  No calf tenderness, no clubbing  Neuro:  no focal motor or sensory deficits, CN 2-12 grossly intact  Psych:  Normal mood and affect, normal judgement and insight       Results Reviewed       Procedure Component Value Units Date/Time    HS Troponin 0hr (reflex protocol) [[id number]]  (Abnormal) Collected: 10/12/24 2112    Lab Status: Final result Specimen: Blood from Arm, Left Updated: 10/12/24 2157     hs TnI 0hr 104 ng/L     Basic metabolic panel [[id number]] Collected: 10/12/24 2112    Lab Status: Final result Specimen: Blood from Arm, Left Updated: 10/12/24 2151     Sodium 140 mmol/L      Potassium 3.7 mmol/L      Chloride 102 mmol/L       CO2 29 mmol/L      ANION GAP 9 mmol/L      BUN 14 mg/dL      Creatinine 1.21 mg/dL      Glucose 112 mg/dL      Calcium 9.9 mg/dL      eGFR 62 ml/min/1.73sq m     Narrative:      [employer] guidelines for Chronic Kidney Disease (CKD):     Stage 1 with normal or high GFR (GFR > 90 mL/min/1.73 square meters)    Stage 2 Mild CKD (GFR = 60-89 mL/min/1.73 square meters)    Stage 3A Moderate CKD (GFR = 45-59 mL/min/1.73 square meters)    Stage 3B Moderate CKD (GFR = 30-44 mL/min/1.73 square meters)    Stage 4 Severe CKD (GFR = 15-29 mL/min/1.73 square meters)    Stage 5 End Stage CKD (GFR <15 mL/min/1.73 square meters)  Note: GFR calculation is accurate only with a steady state creatinine    Lipid panel [[id number]]  (Abnormal) Collected: 10/12/24 2112    Lab Status: Final result Specimen: Blood from Arm, Left Updated: 10/12/24 2151     Cholesterol 203 mg/dL      Triglycerides 271 mg/dL      HDL, Direct 36 mg/dL      LDL Calculated 113 mg/dL      Non-HDL-Chol (CHOL-HDL) 167 mg/dl     Magnesium [[id number]]  (Normal) Collected: 10/12/24 2112    Lab Status: Final result Specimen: Blood from Arm, Left Updated: 10/12/24 2151     Magnesium 2.2 mg/dL     Protime-INR [[id number]]  (Normal) Collected: 10/12/24 2112    Lab Status: Final result Specimen: Blood from Arm, Left Updated: 10/12/24 2149     Protime 13.6 seconds      INR 1.02    Narrative:      INR Therapeutic Range    Indication                                             INR Range      Atrial Fibrillation                                               2.0-3.0  Hypercoagulable State                                    2.0.2.3  Left Ventricular Asist Device                            2.0-3.0  Mechanical Heart Valve                                  -    Aortic(with afib, MI, embolism, HF, LA enlargement,    and/or coagulopathy)                                     2.0-3.0 (2.5-3.5)     Mitral                                                              2.5-3.5  Prosthetic/Bioprosthetic Heart Valve               2.0-3.0  Venous thromboembolism (VTE: VT, PE        2.0-3.0    APTT [[id number]]  (Normal) Collected: 10/12/24 2112    Lab Status: Final result Specimen: Blood from Arm, Left Updated: 10/12/24 2149     PTT 27 seconds     CBC and differential [[id number]] Collected: 10/12/24 2112    Lab Status: Final result Specimen: Blood from Arm, Left Updated: 10/12/24 2131     WBC 8.24 Thousand/uL      RBC 4.91 Million/uL      Hemoglobin 15.5 g/dL      Hematocrit 43.8 %      MCV 89 fL      MCH 31.6 pg      MCHC 35.4 g/dL      RDW 11.9 %      MPV 9.2 fL      Platelets 312 Thousands/uL      nRBC 0 /100 WBCs      Segmented % 46 %      Immature Grans % 0 %      Lymphocytes % 43 %      Monocytes % 8 %      Eosinophils Relative 2 %      Basophils Relative 1 %      Absolute Neutrophils 3.88 Thousands/µL      Absolute Immature Grans 0.02 Thousand/uL      Absolute Lymphocytes 3.50 Thousands/µL      Absolute Monocytes 0.65 Thousand/µL      Eosinophils Absolute 0.13 Thousand/µL      Basophils Absolute 0.06 Thousands/µL             XR chest 1 view portable    (Results Pending)       CriticalCare Time    Date/Time: 10/12/2024 9:50 PM    Performed by: Regina Johnson DO  Authorized by: Regina Johnson DO    Critical care provider statement:     Critical care time (minutes):  50    Critical care time was exclusive of:  Separately billable procedures and treating other patients and teaching time    Critical care was necessary to treat or prevent imminent or life-threatening deterioration of the following conditions:  Cardiac failure (STEMI)    Critical care was time spent personally by me on the following activities:  Obtaining history from patient or surrogate, development of treatment plan with patient or surrogate, examination of patient, evaluation of patient's response to treatment, re-evaluation of patient's condition, ordering and review of radiographic studies, ordering and  performing treatments and interventions, blood draw for specimens, discussions with consultants, review of old charts and ordering and review of laboratory studies (loading medications for STEMI)    I assumed direction of critical care for this patient from another provider in my specialty: no      ECG 12 Lead Documentation  Date/Time: today/date: 10/12/2024  Performed by: Regina Johnson    ECG reviewed by me, the ED Provider: yes    Patient location:  ED   Previous ECG:  Changes noted   Rate:  47  ECG rate assessment: normal    Rhythm: sinus bradycardia    Ectopy:  none    QRS axis:  Normal  Intervals: normal   Q waves: None   ST segments:  Elevation in II, III and aVF.  Depressions V1 and V2  T waves: normal      Impression: Sinus bradycardia with Acute inferior STEMI with reciprocal depression      ECG 12 Lead Documentation - POSTERIOR  Date/Time: today/date: 10/12/2024  Performed by: Regina Johnson    ECG reviewed by me, the ED Provider: yes    Patient location:  ED   Previous ECG:  Changes noted   Rate:  46  ECG rate assessment: normal    Rhythm: sinus bradycardia    Ectopy:  none    QRS axis:  Normal  Intervals: normal   Q waves: None   ST segments:  Elevation in V7 and V8  T waves: normal      Impression: Sinus bradycardia with acute posterior STEMI      ED Medication and Procedure Management   Prior to Admission Medications   Prescriptions Last Dose Informant Patient Reported? Taking?   aspirin (ECOTRIN LOW STRENGTH) 81 mg EC tablet  Self Yes No   Sig: Take 81 mg by mouth   atorvastatin (LIPITOR) 40 mg tablet  Self No No   Sig: Take 1 tablet (40 mg total) by mouth daily at bedtime   metoprolol succinate (TOPROL-XL) 25 mg 24 hr tablet  Self No No   Sig: Take 1 tablet (25 mg total) by mouth daily      Facility-Administered Medications: None     Current Discharge Medication List        CONTINUE these medications which have NOT CHANGED    Details   aspirin (ECOTRIN LOW STRENGTH) 81 mg EC tablet Take 81 mg by  mouth      atorvastatin (LIPITOR) 40 mg tablet Take 1 tablet (40 mg total) by mouth daily at bedtime  Qty: 90 tablet, Refills: 3    Associated Diagnoses: Hypercholesteremia      metoprolol succinate (TOPROL-XL) 25 mg 24 hr tablet Take 1 tablet (25 mg total) by mouth daily  Qty: 90 tablet, Refills: 3    Associated Diagnoses: Hypertension, unspecified type           No discharge procedures on file.  ED SEPSIS DOCUMENTATION   Time reflects when diagnosis was documented in both MDM as applicable and the Disposition within this note       Time User Action Codes Description Comment    10/12/2024  9:05 PM Regina Johnson Add [I21.3] STEMI (ST elevation myocardial infarction) ([facility])                  Regina Johnson DO  10/12/24 2211

## 2024-10-13 NOTE — PROGRESS NOTES
ICU admit note - Critical Care/ICU   Name: Richardson Paez 64 y.o. male I MRN: [id number]  Unit/Bed#: [facility] CATH LAB ROOM I Date of Admission: 10/12/2024   Date of Service: 10/12/2024 I Hospital Day: 0      Assessment & Plan  STEMI (ST elevation myocardial infarction) (HCC)  Presented to the ED with chest pain, found to have a posterior STEMI, underwent cardiac catherization with balloon angioplasty and SUKHI placement to the Ramus  Admitted to the ICU post procedure for monitoring    Plan:  Continuous cardiac monitoring  DAPT per cardiology recommendations  GDMT as able with consideration for BP  Continue statin  Coronary artery disease involving native coronary artery of native heart without angina pectoris  Previous hx of MI with stent placement  Presented with STEMI, s/p cardiac cath and stent placement  See above for further recommendations  Pure hypercholesterolemia  Continue statin  Essential hypertension  Continue metoprolol with hold parameters- was bradycardic in the ED  Disposition: Critical care    ICU Core Measures     A: Assess, Prevent, and Manage Pain Has pain been assessed? Yes  Need for changes to pain regimen? No   B: Both SAT/SAT  N/A   C: Choice of Sedation RASS Goal: N/A patient not on sedation  Need for changes to sedation or analgesia regimen? No   D: Delirium CAM-ICU: Negative   E: Early Mobility  Plan for early mobility? Yes   F: Family Engagement Plan for family engagement today? Yes         Prophylaxis:  VTE VTE covered by:  heparin (porcine), Intravenous, 12 Units/kg/hr at 10/12/24 2113  [Transfer Hold] heparin (porcine), Intravenous  [Transfer Hold] heparin (porcine), Intravenous       Stress Ulcer  not ordered         24 Hour Events : Presented to the ED with chest pain and an EKG concerning for a posterior STEMI. He underwent emergent left heart catherization with balloon angioplasty and SUKHI placement in the Ramus/ currently, the patient denies CP, SOB or nausea    Subjective   Review of  Systems: Review of Systems   All other systems reviewed and are negative.      Objective :                   Vitals I/O      Most Recent Min/Max in 24hrs   Temp (!) 97.4 °F (36.3 °C) Temp  Min: 97.4 °F (36.3 °C)  Max: 97.4 °F (36.3 °C)   Pulse 56 Pulse  Min: 47  Max: 69   Resp 20 Resp  Min: 20  Max: 20   /92 BP  Min: 117/91  Max: 144/95   O2 Sat 100 % SpO2  Min: 99 %  Max: 100 %    No intake or output data in the 24 hours ending 10/12/24 2234    No diet orders on file    Invasive Monitoring           Physical Exam   Physical Exam  Eyes:      Pupils: Pupils are equal, round, and reactive to light.   Skin:     General: Skin is warm and dry.   HENT:      Head: Normocephalic.      Mouth/Throat:      Mouth: Mucous membranes are dry.   Cardiovascular:      Rate and Rhythm: Normal rate.   Musculoskeletal:         General: No swelling.   Abdominal:      Palpations: Abdomen is soft.   Constitutional:       Appearance: He is well-nourished.   Pulmonary:      Effort: Pulmonary effort is normal.      Breath sounds: Normal breath sounds.   Neurological:      General: No focal deficit present.      Mental Status: He is alert and oriented to person, place and time.      Motor: gross motor function is at baseline for patient. Strength full and intact in all extremities.          Diagnostic Studies      Lab Results: I have reviewed the following results: CBC. BMP, Troponin     Medications:  Scheduled PRN   [START ON 10/13/2024] aspirin, 81 mg, Daily  atropine, ,   [Transfer Hold] ticagrelor, 90 mg, Q12H GEOFF  [START ON 10/13/2024] ticagrelor, 90 mg, Q12H GEOFF      atropine, ,   [Transfer Hold] heparin (porcine), 2,000 Units, Q6H PRN  [Transfer Hold] heparin (porcine), 4,000 Units, Q6H PRN  [Transfer Hold] sodium chloride (PF), 3 mL, Q1H PRN       Continuous    heparin (porcine), 3-20 Units/kg/hr (Order-Specific), Last Rate: 12 Units/kg/hr (10/12/24 2113)  sodium chloride, 75 mL/hr           CBC    Recent Labs     10/12/24  2112    WBC 8.24   HGB 15.5   HCT 43.8        BMP    Recent Labs     10/12/24  2112   SODIUM 140   K 3.7      CO2 29   AGAP 9   BUN 14   CREATININE 1.21   CALCIUM 9.9       Coags    Recent Labs     10/12/24  2112   INR 1.02   PTT 27        Additional Electrolytes  Recent Labs     10/12/24  2112   MG 2.2          Blood Gas    No recent results  No recent results LFTs  No recent results    Infectious  No recent results  Glucose  Recent Labs     10/12/24  2112   GLUC 112

## 2024-10-13 NOTE — PROGRESS NOTES
Progress Note - Critical Care/ICU   Name: Richardson Paez 64 y.o. male I MRN: [id number]  Unit/Bed#: ICU 05 I Date of Admission: 10/12/2024   Date of Service: 10/13/2024 I Hospital Day: 0      Assessment & Plan  STEMI (ST elevation myocardial infarction) (HCC)  Presented to the ED with chest pain, found to have a posterior STEMI, underwent cardiac catherization with balloon angioplasty and SUKHI placement to the Ramus  Admitted to the ICU post procedure for monitoring    Plan:  Continuous cardiac monitoring  DAPT per cardiology recommendations  GDMT as able with consideration for BP  Continue statin  ECHO is pending  Coronary artery disease involving native coronary artery of native heart without angina pectoris  Previous hx of MI with stent placement  Presented with STEMI, s/p cardiac cath and stent placement  See above for further recommendations  Pure hypercholesterolemia  Continue statin  Essential hypertension  Continue metoprolol with hold parameters- was bradycardic in the ED  Disposition: Med Surg with Telemetry    ICU Core Measures     A: Assess, Prevent, and Manage Pain Has pain been assessed? Yes  Need for changes to pain regimen? No   B: Both SAT/SAT  N/A   C: Choice of Sedation RASS Goal: N/A patient not on sedation  Need for changes to sedation or analgesia regimen? NA   D: Delirium CAM-ICU: Negative   E: Early Mobility  Plan for early mobility? Yes   F: Family Engagement Plan for family engagement today? Yes         Prophylaxis:  VTE Contraindicated secondary to: on DAPT   Stress Ulcer  not ordered         24 Hour Events : S/P cardiac catherization. Denies CP. Does complain of some back pain that is muscular in nature  Subjective   Review of Systems: Review of Systems    Objective :                   Vitals I/O      Most Recent Min/Max in 24hrs   Temp 97.8 °F (36.6 °C) Temp  Min: 97.4 °F (36.3 °C)  Max: 97.8 °F (36.6 °C)   Pulse 62 Pulse  Min: 46  Max: 69   Resp (!) 37 Resp  Min: 13  Max: 37   /84  BP  Min: 99/59  Max: 144/95   O2 Sat 97 % SpO2  Min: 96 %  Max: 100 %      Intake/Output Summary (Last 24 hours) at 10/13/2024 0540  Last data filed at 10/13/2024 0400  Gross per 24 hour   Intake 367.67 ml   Output 350 ml   Net 17.67 ml       Diet Cardiovascular; Cardiac    Invasive Monitoring           Physical Exam   Physical Exam  Eyes:      Pupils: Pupils are equal, round, and reactive to light.   Skin:     General: Skin is warm and dry.   HENT:      Head: Normocephalic.      Mouth/Throat:      Mouth: Mucous membranes are dry.   Cardiovascular:      Rate and Rhythm: Normal rate and regular rhythm.   Abdominal:      Palpations: Abdomen is soft.      Tenderness: There is no abdominal tenderness.   Constitutional:       Appearance: He is well-nourished.   Pulmonary:      Effort: Pulmonary effort is normal.      Breath sounds: Normal breath sounds.   Neurological:      General: No focal deficit present.      Mental Status: He is alert.      Motor: gross motor function is at baseline for patient. Strength full and intact in all extremities.          Diagnostic Studies      Lab Results: I have reviewed the following results: CBC, Troponin, BMP     Medications:  Scheduled PRN   aspirin, 81 mg, Daily  atorvastatin, 80 mg, Daily With Dinner  atropine, ,   [Transfer Hold] ticagrelor, 90 mg, Q12H GEOFF  ticagrelor, 90 mg, Q12H GEOFF      atropine, ,   nitroglycerin, 0.4 mg, Once PRN  ondansetron, 4 mg, Once PRN  [Transfer Hold] sodium chloride (PF), 3 mL, Q1H PRN       Continuous    sodium chloride, 75 mL/hr           CBC    Recent Labs     10/12/24  2112 10/13/24  0514   WBC 8.24 8.71   HGB 15.5 15.1   HCT 43.8 42.5    266     BMP    Recent Labs     10/12/24  2112   SODIUM 140   K 3.7      CO2 29   AGAP 9   BUN 14   CREATININE 1.21   CALCIUM 9.9       Coags    Recent Labs     10/12/24  2112   INR 1.02   PTT 27        Additional Electrolytes  Recent Labs     10/12/24  2112   MG 2.2          Blood Gas    No recent  results  No recent results LFTs  No recent results    Infectious  No recent results  Glucose  Recent Labs     10/12/24  2112   GLUC 112

## 2024-10-13 NOTE — ASSESSMENT & PLAN NOTE
- Initial EKG showed ST elevations in the inferior leads  - [facility] 10/12/2024 showed 99% ramus occlusion, status post PCI with SUKHI x 1, OM1 50%, moderate residual disease in the LCx and RCA  - Continue with aspirin 81 mg daily, Brilinta 90 mg twice daily and atorvastatin 80 mg daily  - Pending TTE

## 2024-10-13 NOTE — PLAN OF CARE
Problem: PAIN - ADULT  Goal: Verbalizes/displays adequate comfort level or baseline comfort level  Description: Interventions:  - Encourage patient to monitor pain and request assistance  - Assess pain using appropriate pain scale  - Administer analgesics based on type and severity of pain and evaluate response  - Implement non-pharmacological measures as appropriate and evaluate response  - Consider cultural and social influences on pain and pain management  - Notify physician/advanced practitioner if interventions unsuccessful or patient reports new pain  Outcome: Progressing     Problem: INFECTION - ADULT  Goal: Absence or prevention of progression during hospitalization  Description: INTERVENTIONS:  - Assess and monitor for signs and symptoms of infection  - Monitor lab/diagnostic results  - Monitor all insertion sites, i.e. indwelling lines, tubes, and drains  - Monitor endotracheal if appropriate and nasal secretions for changes in amount and color  - [place] appropriate cooling/warming therapies per order  - Administer medications as ordered  - Instruct and encourage patient and family to use good hand hygiene technique  - Identify and instruct in appropriate isolation precautions for identified infection/condition  Outcome: Progressing  Goal: Absence of fever/infection during neutropenic period  Description: INTERVENTIONS:  - Monitor WBC    Outcome: Progressing     Problem: SAFETY ADULT  Goal: Patient will remain free of falls  Description: INTERVENTIONS:  - Educate patient/family on patient safety including physical limitations  - Instruct patient to call for assistance with activity   - Consult OT/PT to assist with strengthening/mobility   - Keep Call bell within reach  - Keep bed low and locked with side rails adjusted as appropriate  - Keep care items and personal belongings within reach  - Initiate and maintain comfort rounds  - Make Fall Risk Sign visible to staff  - Offer Toileting every 2 Hours,  in advance of need  - Initiate/Maintain bed alarm  - Obtain necessary fall risk management equipment  - Apply yellow socks and bracelet for high fall risk patients  - Consider moving patient to room near nurses station  Outcome: Progressing  Goal: Maintain or return to baseline ADL function  Description: INTERVENTIONS:  -  Assess patient's ability to carry out ADLs; assess patient's baseline for ADL function and identify physical deficits which impact ability to perform ADLs (bathing, care of mouth/teeth, toileting, grooming, dressing, etc.)  - Assess/evaluate cause of self-care deficits   - Assess range of motion  - Assess patient's mobility; develop plan if impaired  - Assess patient's need for assistive devices and provide as appropriate  - Encourage maximum independence but intervene and supervise when necessary  - Involve family in performance of ADLs  - Assess for home care needs following discharge   - Consider OT consult to assist with ADL evaluation and planning for discharge  - Provide patient education as appropriate  Outcome: Progressing  Goal: Maintains/Returns to pre admission functional level  Description: INTERVENTIONS:  - Perform AM-PAC 6 Click Basic Mobility/ Daily Activity assessment daily.  - Set and communicate daily mobility goal to care team and patient/family/caregiver.   - Collaborate with rehabilitation services on mobility goals if consulted  - Perform Range of Motion 3 times a day.  - Reposition patient every 2 hours.  - Dangle patient 2 times a day  - Stand patient 2 times a day  - Ambulate patient 2 times a day  - Out of bed to chair 2 times a day   - Out of bed for meals 2 times a day  - Out of bed for toileting  - Record patient progress and toleration of activity level   Outcome: Progressing     Problem: DISCHARGE PLANNING  Goal: Discharge to home or other facility with appropriate resources  Description: INTERVENTIONS:  - Identify barriers to discharge w/patient and caregiver  -  Arrange for needed discharge resources and transportation as appropriate  - Identify discharge learning needs (meds, wound care, etc.)  - Arrange for interpretive services to assist at discharge as needed  - Refer to Case Management Department for coordinating discharge planning if the patient needs post-hospital services based on physician/advanced practitioner order or complex needs related to functional status, cognitive ability, or social support system  Outcome: Progressing     Problem: Knowledge Deficit  Goal: Patient/family/caregiver demonstrates understanding of disease process, treatment plan, medications, and discharge instructions  Description: Complete learning assessment and assess knowledge base.  Interventions:  - Provide teaching at level of understanding  - Provide teaching via preferred learning methods  Outcome: Progressing     Problem: Nutrition/Hydration-ADULT  Goal: Nutrient/Hydration intake appropriate for improving, restoring or maintaining nutritional needs  Description: Monitor and assess patient's nutrition/hydration status for malnutrition. Collaborate with interdisciplinary team and initiate plan and interventions as ordered.  Monitor patient's weight and dietary intake as ordered or per policy. Utilize nutrition screening tool and intervene as necessary. Determine patient's food preferences and provide high-protein, high-caloric foods as appropriate.     INTERVENTIONS:  - Monitor oral intake, urinary output, labs, and treatment plans  - Assess nutrition and hydration status and recommend course of action  - Evaluate amount of meals eaten  - Assist patient with eating if necessary   - Allow adequate time for meals  - Recommend/ encourage appropriate diets, oral nutritional supplements, and vitamin/mineral supplements  - Order, calculate, and assess calorie counts as needed  - Recommend, monitor, and adjust tube feedings and TPN/PPN based on assessed needs  - Assess need for intravenous  fluids  - Provide specific nutrition/hydration education as appropriate  - Include patient/family/caregiver in decisions related to nutrition  Outcome: Progressing     Problem: COPING  Goal: Pt/Family able to verbalize concerns and demonstrate effective coping strategies  Description: INTERVENTIONS:  - Assist patient/family to identify coping skills, available support systems and cultural and spiritual values  - Provide emotional support, including active listening and acknowledgement of concerns of patient and caregivers  - Reduce environmental stimuli, as able  - Provide patient education  - Assess for spiritual pain/suffering and initiate spiritual care, including notification of Pastoral Care or [place] based community as needed  - Assess effectiveness of coping strategies  Outcome: Progressing  Goal: Will report anxiety at manageable levels  Description: INTERVENTIONS:  - Administer medication as ordered  - Teach and encourage coping skills  - Provide emotional support  - Assess patient/family for anxiety and ability to cope  Outcome: Progressing

## 2024-10-13 NOTE — H&P
History & Physical - Cardiology   Richardson Paez 64 y.o. male MRN: [id number]  Unit/Bed#: [facility] CATH LAB ROOM Encounter: [id number]     Office Cardiologist:  Shai Hogan MD    PCP:  Faisal Hernandez, DO    Assessment & Plan   ASSESSMENT & PLAN  1. Post STEMI / CP    - Proceed with emergent LHC   - Further recs to follow  2. CAD    - Prior MI with SUKHI to the OM, remote   - Followed outpatient with routine monitoring  3. HTN    - Hold home medications for now  4. HLD    - High intensity statin to follow  5. Tobacco History    - Continue support for smoking cessation       Subjective   HISTORY OF PRESENT ILLNESS  Mr. Richardson Paez is a 64 y.o. male and current smoker with a PMH significant for but not limited to CAD, HTN, HLD.  He presents to the cathlab for emergent LHC.  He was originally seen [facility] ED with CP that began at 1830.  EKG on ED arrival with concern for possible posterior STEMI.  He reports that the pain began at rest was substernal and associated with Nausea.  Ongoing at time of evaluation.    HISTORICAL INFORMATION  Historical Information   History reviewed. No pertinent past medical history.  Past Surgical History:   Procedure Laterality Date    APPENDECTOMY      CORONARY ANGIOPLASTY WITH STENT PLACEMENT      HERNIA REPAIR       Social History   Social History     Substance and Sexual Activity   Alcohol Use Yes    Comment: social     Social History     Substance and Sexual Activity   Drug Use Not on file     Social History     Tobacco Use   Smoking Status Former    Current packs/day: 0.00    Types: Cigarettes    Quit date:     Years since quittin.7   Smokeless Tobacco Never     Family History   Problem Relation Age of Onset    Heart disease Mother     Heart disease Father        MEDICATIONS & ALLERGIES  Meds/Allergies   all medications and allergies reviewed  Allergies   Allergen Reactions    Penicillins Rash       REVIEW OF SYSTEMS  Review of Systems   Constitutional: Positive for  diaphoresis.   Cardiovascular:  Positive for chest pain.   Gastrointestinal:  Positive for abdominal pain and nausea.   All other systems reviewed and are negative.         Objective   PHYSICAL EXAM  /92   Pulse 56   Temp (!) 97.4 °F (36.3 °C) (Oral)   Resp 20   Wt 84.9 kg (187 lb 2.7 oz)   SpO2 100%   BMI 23.39 kg/m²   Physical Exam  Constitutional:       Appearance: He is ill-appearing.   Neck:      Vascular: No carotid bruit.   Cardiovascular:      Rate and Rhythm: Regular rhythm. Bradycardia present.      Pulses: Normal pulses.      Heart sounds: No murmur heard.     No gallop.   Pulmonary:      Effort: Pulmonary effort is normal. No respiratory distress.      Breath sounds: Normal breath sounds. No wheezing or rales.   Musculoskeletal:      Cervical back: Neck supple.      Right lower leg: No edema.      Left lower leg: No edema.   Skin:     General: Skin is warm and dry.      Coloration: Skin is not jaundiced.   Neurological:      Mental Status: He is alert. Mental status is at baseline.         CV DIAGNOSTICS  EKG:  reviewed  ETT: n/a  TTE/VASQUEZ/SE:  n/a  MPI:  reviewed  CMR:  n/a  CCTA/CCS:  n/a  RHC/LHC/PCI:  n/a       Rayne Miller DO 10/12/24

## 2024-10-14 ENCOUNTER — TRANSITIONAL CARE MANAGEMENT (OUTPATIENT)
Dept: INTERNAL MEDICINE CLINIC | Facility: CLINIC | Age: 64
End: 2024-10-14

## 2024-10-14 ENCOUNTER — TELEPHONE (OUTPATIENT)
Age: 64
End: 2024-10-14

## 2024-10-14 VITALS
BODY MASS INDEX: 22.5 KG/M2 | OXYGEN SATURATION: 99 % | DIASTOLIC BLOOD PRESSURE: 95 MMHG | WEIGHT: 181 LBS | HEART RATE: 62 BPM | TEMPERATURE: 98 F | SYSTOLIC BLOOD PRESSURE: 142 MMHG | HEIGHT: 75 IN | RESPIRATION RATE: 18 BRPM

## 2024-10-14 DIAGNOSIS — I25.10 CAD (CORONARY ARTERY DISEASE): Primary | ICD-10-CM

## 2024-10-14 PROCEDURE — 99239 HOSP IP/OBS DSCHRG MGMT >30: CPT | Performed by: INTERNAL MEDICINE

## 2024-10-14 PROCEDURE — 99232 SBSQ HOSP IP/OBS MODERATE 35: CPT | Performed by: STUDENT IN AN ORGANIZED HEALTH CARE EDUCATION/TRAINING PROGRAM

## 2024-10-14 RX ORDER — ATORVASTATIN CALCIUM 80 MG/1
80 TABLET, FILM COATED ORAL
Qty: 90 TABLET | Refills: 0 | Status: SHIPPED | OUTPATIENT
Start: 2024-10-14

## 2024-10-14 RX ORDER — LISINOPRIL 5 MG/1
5 TABLET ORAL DAILY
Qty: 90 TABLET | Refills: 0 | Status: SHIPPED | OUTPATIENT
Start: 2024-10-15

## 2024-10-14 RX ADMIN — ASPIRIN 81 MG: 81 TABLET, COATED ORAL at 07:46

## 2024-10-14 RX ADMIN — LISINOPRIL 5 MG: 5 TABLET ORAL at 07:46

## 2024-10-14 RX ADMIN — TICAGRELOR 90 MG: 90 TABLET ORAL at 07:46

## 2024-10-14 NOTE — TELEPHONE ENCOUNTER
Patient's spouse called to schedule TCM for patient.  He was actively discharging from hospital at the time of the call.  Attempted warm transfer to both lines.  She asked that someone please call patient's phone back at [phone] to schedule the TCM.

## 2024-10-14 NOTE — PLAN OF CARE
Problem: PAIN - ADULT  Goal: Verbalizes/displays adequate comfort level or baseline comfort level  Description: Interventions:  - Encourage patient to monitor pain and request assistance  - Assess pain using appropriate pain scale  - Administer analgesics based on type and severity of pain and evaluate response  - Implement non-pharmacological measures as appropriate and evaluate response  - Consider cultural and social influences on pain and pain management  - Notify physician/advanced practitioner if interventions unsuccessful or patient reports new pain  Outcome: Progressing     Problem: INFECTION - ADULT  Goal: Absence or prevention of progression during hospitalization  Description: INTERVENTIONS:  - Assess and monitor for signs and symptoms of infection  - Monitor lab/diagnostic results  - Monitor all insertion sites, i.e. indwelling lines, tubes, and drains  - Monitor endotracheal if appropriate and nasal secretions for changes in amount and color  - [place] appropriate cooling/warming therapies per order  - Administer medications as ordered  - Instruct and encourage patient and family to use good hand hygiene technique  - Identify and instruct in appropriate isolation precautions for identified infection/condition  Outcome: Progressing  Goal: Absence of fever/infection during neutropenic period  Description: INTERVENTIONS:  - Monitor WBC    Outcome: Progressing     Problem: SAFETY ADULT  Goal: Patient will remain free of falls  Description: INTERVENTIONS:  - Educate patient/family on patient safety including physical limitations  - Instruct patient to call for assistance with activity   - Consult OT/PT to assist with strengthening/mobility   - Keep Call bell within reach  - Keep bed low and locked with side rails adjusted as appropriate  - Keep care items and personal belongings within reach  - Initiate and maintain comfort rounds  - Make Fall Risk Sign visible to staff  - Offer Toileting every 2 Hours,  in advance of need  - Initiate/Maintain bed/chair alarm  - Obtain necessary fall risk management equipment: bed/chair alarm  - Apply yellow socks and bracelet for high fall risk patients  - Consider moving patient to room near nurses station  Outcome: Progressing  Goal: Maintain or return to baseline ADL function  Description: INTERVENTIONS:  -  Assess patient's ability to carry out ADLs; assess patient's baseline for ADL function and identify physical deficits which impact ability to perform ADLs (bathing, care of mouth/teeth, toileting, grooming, dressing, etc.)  - Assess/evaluate cause of self-care deficits   - Assess range of motion  - Assess patient's mobility; develop plan if impaired  - Assess patient's need for assistive devices and provide as appropriate  - Encourage maximum independence but intervene and supervise when necessary  - Involve family in performance of ADLs  - Assess for home care needs following discharge   - Consider OT consult to assist with ADL evaluation and planning for discharge  - Provide patient education as appropriate  Outcome: Progressing  Goal: Maintains/Returns to pre admission functional level  Description: INTERVENTIONS:  - Perform AM-PAC 6 Click Basic Mobility/ Daily Activity assessment daily.  - Set and communicate daily mobility goal to care team and patient/family/caregiver.   - Collaborate with rehabilitation services on mobility goals if consulted  - Perform Range of Motion 4 times a day.  - Reposition patient every 2 hours.  - Dangle patient 3 times a day  - Stand patient 3 times a day  - Ambulate patient 3 times a day  - Out of bed to chair 3 times a day   - Out of bed for meals 3 times a day  - Out of bed for toileting  - Record patient progress and toleration of activity level   Outcome: Progressing     Problem: DISCHARGE PLANNING  Goal: Discharge to home or other facility with appropriate resources  Description: INTERVENTIONS:  - Identify barriers to discharge  w/patient and caregiver  - Arrange for needed discharge resources and transportation as appropriate  - Identify discharge learning needs (meds, wound care, etc.)  - Arrange for interpretive services to assist at discharge as needed  - Refer to Case Management Department for coordinating discharge planning if the patient needs post-hospital services based on physician/advanced practitioner order or complex needs related to functional status, cognitive ability, or social support system  Outcome: Progressing     Problem: Knowledge Deficit  Goal: Patient/family/caregiver demonstrates understanding of disease process, treatment plan, medications, and discharge instructions  Description: Complete learning assessment and assess knowledge base.  Interventions:  - Provide teaching at level of understanding  - Provide teaching via preferred learning methods  Outcome: Progressing     Problem: Nutrition/Hydration-ADULT  Goal: Nutrient/Hydration intake appropriate for improving, restoring or maintaining nutritional needs  Description: Monitor and assess patient's nutrition/hydration status for malnutrition. Collaborate with interdisciplinary team and initiate plan and interventions as ordered.  Monitor patient's weight and dietary intake as ordered or per policy. Utilize nutrition screening tool and intervene as necessary. Determine patient's food preferences and provide high-protein, high-caloric foods as appropriate.     INTERVENTIONS:  - Monitor oral intake, urinary output, labs, and treatment plans  - Assess nutrition and hydration status and recommend course of action  - Evaluate amount of meals eaten  - Assist patient with eating if necessary   - Allow adequate time for meals  - Recommend/ encourage appropriate diets, oral nutritional supplements, and vitamin/mineral supplements  - Order, calculate, and assess calorie counts as needed  - Recommend, monitor, and adjust tube feedings and TPN/PPN based on assessed needs  -  Assess need for intravenous fluids  - Provide specific nutrition/hydration education as appropriate  - Include patient/family/caregiver in decisions related to nutrition  Outcome: Progressing     Problem: COPING  Goal: Pt/Family able to verbalize concerns and demonstrate effective coping strategies  Description: INTERVENTIONS:  - Assist patient/family to identify coping skills, available support systems and cultural and spiritual values  - Provide emotional support, including active listening and acknowledgement of concerns of patient and caregivers  - Reduce environmental stimuli, as able  - Provide patient education  - Assess for spiritual pain/suffering and initiate spiritual care, including notification of Pastoral Care or [place] based community as needed  - Assess effectiveness of coping strategies  Outcome: Progressing  Goal: Will report anxiety at manageable levels  Description: INTERVENTIONS:  - Administer medication as ordered  - Teach and encourage coping skills  - Provide emotional support  - Assess patient/family for anxiety and ability to cope  Outcome: Progressing

## 2024-10-14 NOTE — ASSESSMENT & PLAN NOTE
- History of CAD with prior stenting  -Patient reports having a heart attack with stents placed at [facility] on 4/12/2012, unknown vessel

## 2024-10-14 NOTE — ASSESSMENT & PLAN NOTE
- Outpatient Rx Toprol-XL 25 mg daily, currently holding due to bradycardia  - Continue with lisinopril 5 mg daily

## 2024-10-14 NOTE — PROGRESS NOTES
Progress Note - Cardiology   Name: Richardson Paez 64 y.o. male I MRN: [id number]  Unit/Bed#: E4 -01 I Date of Admission: 10/12/2024   Date of Service: 10/14/2024 I Hospital Day: 1     Assessment & Plan  STEMI (ST elevation myocardial infarction) (HCC)  - Initial EKG showed ST elevations in the inferior leads  - LHC 10/12/2024 showed 99% ramus occlusion, status post PCI with SUKHI x 1, OM1 50%, moderate residual disease in the LCx and RCA  - Continue with aspirin 81 mg daily, Brilinta 90 mg twice daily and atorvastatin 80 mg daily  -TTE 10/13/2024 showed EF 48% with hypokinesis of the lateral wall, grade 1 DD, mildly dilated RV with normal function, mild LAE, mild MR, mild to moderate TR  Coronary artery disease involving native coronary artery of native heart without angina pectoris  - History of CAD with prior stenting  -Patient reports having a heart attack with stents placed at [facility] on 4/12/2012, unknown vessel  Pure hypercholesterolemia  - Outpatient Rx atorvastatin 40 mg daily  - Continue with atorvastatin 80 mg daily  - Lipid profile 10/13/2024 showed total cholesterol 180, triglycerides 334, HDL 32, LDL 81  Essential hypertension  - Outpatient Rx Toprol-XL 25 mg daily, currently holding due to bradycardia  - Continue with lisinopril 5 mg daily  Tobacco abuse  -Former smoker, quit smoking after first MI in April 2012    Summary:  - Continue with aspirin 81 mg daily, Brilinta 90 mg twice daily and atorvastatin 80 mg daily  -Per pharmacy, Brilinta cost $50 per month, patient reports it is affordable  -Continue with lisinopril 5 mg daily, discontinue Toprol-XL due to bradycardia  -TTE yesterday showed EF 48% with lateral wall motion abnormality, recommend repeating echo in 3 months  - Cardiac rehab referral on discharge  -Stable from a cardiac standpoint for discharge home today  - Will message office to arrange an outpatient cardiology follow-up appointment      Subjective:   No significant events  "overnight.  He reports not sleeping well, but otherwise is feeling fine.  Denies any chest pain, shortness of breath, orthopnea, abdominal pain, vomiting, fever, chills, headache, dizziness or palpitations.    Objective:     Vitals: Blood pressure 143/90, pulse 63, temperature (!) 97.1 °F (36.2 °C), temperature source Temporal, resp. rate 18, height 6' 3\" (1.905 m), weight 82.1 kg (181 lb), SpO2 99%., Body mass index is 22.62 kg/m².,   Orthostatic Blood Pressures      Flowsheet Row Most Recent Value   Blood Pressure 143/90 filed at 10/14/2024 0743   Patient Position - Orthostatic VS Lying filed at 10/14/2024 0743              Intake/Output Summary (Last 24 hours) at 10/14/2024 1030  Last data filed at 10/14/2024 0854  Gross per 24 hour   Intake 140 ml   Output 700 ml   Net -560 ml           Physical Exam:    GEN: Richardson Paez appears well, alert and oriented x 3, pleasant and cooperative   HEENT: Mucous membranes moist, no scleral icterus, no conjunctival pallor  NECK: No elevated JVD  HEART: Regular rate and rhythm, normal S1 and S2, no murmurs or rubs   LUNGS: clear to auscultation bilaterally; no wheezes, rales, or rhonchi   ABDOMEN: normal bowel sounds, soft, no tenderness, no distention  EXTREMITIES: peripheral pulses normal; no lower extremity edema   NEURO: no focal findings   SKIN: No lesions or rashes on exposed skin        Current Facility-Administered Medications:     acetaminophen (TYLENOL) tablet 975 mg, 975 mg, Oral, Q6H PRN, Kathe Maldonado, DO, 975 mg at 10/13/24 1950    aspirin (ECOTRIN LOW STRENGTH) EC tablet 81 mg, 81 mg, Oral, Daily, Harnishkumar Maldonado, DO, 81 mg at 10/14/24 0746    atorvastatin (LIPITOR) tablet 80 mg, 80 mg, Oral, Daily With Dinner, Harnishkumar Maldonado, DO, 80 mg at 10/13/24 1621    lisinopril (ZESTRIL) tablet 5 mg, 5 mg, Oral, Daily, Michaelnishkdinora Maldonado, DO, 5 mg at 10/14/24 0746    nitroglycerin (NITROSTAT) SL tablet 0.4 mg, 0.4 mg, Sublingual, Once PRN, Kathe Maldonado, " "DO    Insert peripheral IV, , , Once **AND** sodium chloride (PF) 0.9 % injection 3 mL, 3 mL, Intravenous, Q1H PRN, Kathe Maldonado DO    sodium chloride 0.9 % infusion, 75 mL/hr, Intravenous, Continuous, Kathe Maldonado DO, Stopped at 10/13/24 1643    ticagrelor (BRILINTA) tablet 90 mg, 90 mg, Oral, Q12H GEOFF, Kathe Maldonado DO, 90 mg at 10/14/24 0746    Labs & Results:    No results found for: \"CKTOTAL\", \"CKMB\", \"CKMBINDEX\", \"TROPONINI\"    Lab Results   Component Value Date    GLUCOSE 93 12/03/2014    CALCIUM 9.0 10/13/2024     12/03/2014    K 4.1 10/13/2024    CO2 27 10/13/2024     10/13/2024    BUN 13 10/13/2024    CREATININE 0.90 10/13/2024       Lab Results   Component Value Date    WBC 8.71 10/13/2024    HGB 15.1 10/13/2024    HCT 42.5 10/13/2024    MCV 90 10/13/2024     10/13/2024     Results from last 7 days   Lab Units 10/12/24  2112   INR  1.02       Lab Results   Component Value Date    CHOL 143 07/18/2017    CHOL 122 (L) 10/11/2013     Lab Results   Component Value Date    HDL 32 (L) 10/13/2024    HDL 36 (L) 10/12/2024     Lab Results   Component Value Date    LDLCALC 81 10/13/2024    LDLCALC 113 (H) 10/12/2024     Lab Results   Component Value Date    TRIG 334 (H) 10/13/2024    TRIG 271 (H) 10/12/2024       Lab Results   Component Value Date    ALT 22 06/26/2023    AST 18 06/26/2023    ALKPHOS 94 06/26/2023         EKG personally reviewed by )Arjun Barton MD. No acute changes   TELE: No significant arrhythmias seen on telemetry review.           "

## 2024-10-14 NOTE — PLAN OF CARE
Problem: PAIN - ADULT  Goal: Verbalizes/displays adequate comfort level or baseline comfort level  Description: Interventions:  - Encourage patient to monitor pain and request assistance  - Assess pain using appropriate pain scale  - Administer analgesics based on type and severity of pain and evaluate response  - Implement non-pharmacological measures as appropriate and evaluate response  - Consider cultural and social influences on pain and pain management  - Notify physician/advanced practitioner if interventions unsuccessful or patient reports new pain  Outcome: Progressing     Problem: INFECTION - ADULT  Goal: Absence or prevention of progression during hospitalization  Description: INTERVENTIONS:  - Assess and monitor for signs and symptoms of infection  - Monitor lab/diagnostic results  - Monitor all insertion sites, i.e. indwelling lines, tubes, and drains  - Monitor endotracheal if appropriate and nasal secretions for changes in amount and color  - [place] appropriate cooling/warming therapies per order  - Administer medications as ordered  - Instruct and encourage patient and family to use good hand hygiene technique  - Identify and instruct in appropriate isolation precautions for identified infection/condition  Outcome: Progressing  Goal: Absence of fever/infection during neutropenic period  Description: INTERVENTIONS:  - Monitor WBC    Outcome: Progressing     Problem: SAFETY ADULT  Goal: Patient will remain free of falls  Description: INTERVENTIONS:  - Educate patient/family on patient safety including physical limitations  - Instruct patient to call for assistance with activity   - Consult OT/PT to assist with strengthening/mobility   - Keep Call bell within reach  - Keep bed low and locked with side rails adjusted as appropriate  - Keep care items and personal belongings within reach  - Initiate and maintain comfort rounds  - Make Fall Risk Sign visible to staff  - Offer Toileting every 3 Hours,  in advance of need  - Initiate/Maintain bed alarm  - Obtain necessary fall risk management equipment:   - Apply yellow socks and bracelet for high fall risk patients  - Consider moving patient to room near nurses station  Outcome: Progressing  Goal: Maintain or return to baseline ADL function  Description: INTERVENTIONS:  -  Assess patient's ability to carry out ADLs; assess patient's baseline for ADL function and identify physical deficits which impact ability to perform ADLs (bathing, care of mouth/teeth, toileting, grooming, dressing, etc.)  - Assess/evaluate cause of self-care deficits   - Assess range of motion  - Assess patient's mobility; develop plan if impaired  - Assess patient's need for assistive devices and provide as appropriate  - Encourage maximum independence but intervene and supervise when necessary  - Involve family in performance of ADLs  - Assess for home care needs following discharge   - Consider OT consult to assist with ADL evaluation and planning for discharge  - Provide patient education as appropriate  Outcome: Progressing  Goal: Maintains/Returns to pre admission functional level  Description: INTERVENTIONS:  - Perform AM-PAC 6 Click Basic Mobility/ Daily Activity assessment daily.  - Set and communicate daily mobility goal to care team and patient/family/caregiver.   - Collaborate with rehabilitation services on mobility goals if consulted  - Perform Range of Motion 3 times a day.  - Reposition patient every 2 hours.  - Dangle patient 3 times a day  - Stand patient 3 times a day  - Ambulate patient 3 times a day  - Out of bed to chair 3 times a day   - Out of bed for meals 3 times a day  - Out of bed for toileting  - Record patient progress and toleration of activity level   Outcome: Progressing     Problem: DISCHARGE PLANNING  Goal: Discharge to home or other facility with appropriate resources  Description: INTERVENTIONS:  - Identify barriers to discharge w/patient and caregiver  -  Arrange for needed discharge resources and transportation as appropriate  - Identify discharge learning needs (meds, wound care, etc.)  - Arrange for interpretive services to assist at discharge as needed  - Refer to Case Management Department for coordinating discharge planning if the patient needs post-hospital services based on physician/advanced practitioner order or complex needs related to functional status, cognitive ability, or social support system  Outcome: Progressing     Problem: Knowledge Deficit  Goal: Patient/family/caregiver demonstrates understanding of disease process, treatment plan, medications, and discharge instructions  Description: Complete learning assessment and assess knowledge base.  Interventions:  - Provide teaching at level of understanding  - Provide teaching via preferred learning methods  Outcome: Progressing     Problem: Nutrition/Hydration-ADULT  Goal: Nutrient/Hydration intake appropriate for improving, restoring or maintaining nutritional needs  Description: Monitor and assess patient's nutrition/hydration status for malnutrition. Collaborate with interdisciplinary team and initiate plan and interventions as ordered.  Monitor patient's weight and dietary intake as ordered or per policy. Utilize nutrition screening tool and intervene as necessary. Determine patient's food preferences and provide high-protein, high-caloric foods as appropriate.     INTERVENTIONS:  - Monitor oral intake, urinary output, labs, and treatment plans  - Assess nutrition and hydration status and recommend course of action  - Evaluate amount of meals eaten  - Assist patient with eating if necessary   - Allow adequate time for meals  - Recommend/ encourage appropriate diets, oral nutritional supplements, and vitamin/mineral supplements  - Order, calculate, and assess calorie counts as needed  - Recommend, monitor, and adjust tube feedings and TPN/PPN based on assessed needs  - Assess need for intravenous  fluids  - Provide specific nutrition/hydration education as appropriate  - Include patient/family/caregiver in decisions related to nutrition  Outcome: Progressing     Problem: COPING  Goal: Pt/Family able to verbalize concerns and demonstrate effective coping strategies  Description: INTERVENTIONS:  - Assist patient/family to identify coping skills, available support systems and cultural and spiritual values  - Provide emotional support, including active listening and acknowledgement of concerns of patient and caregivers  - Reduce environmental stimuli, as able  - Provide patient education  - Assess for spiritual pain/suffering and initiate spiritual care, including notification of Pastoral Care or [place] based community as needed  - Assess effectiveness of coping strategies  Outcome: Progressing  Goal: Will report anxiety at manageable levels  Description: INTERVENTIONS:  - Administer medication as ordered  - Teach and encourage coping skills  - Provide emotional support  - Assess patient/family for anxiety and ability to cope  Outcome: Progressing

## 2024-10-14 NOTE — PLAN OF CARE
Problem: PAIN - ADULT  Goal: Verbalizes/displays adequate comfort level or baseline comfort level  Description: Interventions:  - Encourage patient to monitor pain and request assistance  - Assess pain using appropriate pain scale  - Administer analgesics based on type and severity of pain and evaluate response  - Implement non-pharmacological measures as appropriate and evaluate response  - Consider cultural and social influences on pain and pain management  - Notify physician/advanced practitioner if interventions unsuccessful or patient reports new pain  10/14/2024 1337 by Rozina Ayon  Outcome: Adequate for Discharge  10/14/2024 1336 by Rozina Ayon  Outcome: Progressing  10/14/2024 0836 by Rozina Ayon  Outcome: Progressing     Problem: INFECTION - ADULT  Goal: Absence or prevention of progression during hospitalization  Description: INTERVENTIONS:  - Assess and monitor for signs and symptoms of infection  - Monitor lab/diagnostic results  - Monitor all insertion sites, i.e. indwelling lines, tubes, and drains  - Monitor endotracheal if appropriate and nasal secretions for changes in amount and color  - [place] appropriate cooling/warming therapies per order  - Administer medications as ordered  - Instruct and encourage patient and family to use good hand hygiene technique  - Identify and instruct in appropriate isolation precautions for identified infection/condition  10/14/2024 1337 by Rozina Ayon  Outcome: Adequate for Discharge  10/14/2024 1336 by Rozina Ayon  Outcome: Progressing  10/14/2024 0836 by Rozina Ayon  Outcome: Progressing  Goal: Absence of fever/infection during neutropenic period  Description: INTERVENTIONS:  - Monitor WBC    10/14/2024 1337 by Rozina Ayon  Outcome: Adequate for Discharge  10/14/2024 1336 by Rozina Ayon  Outcome: Progressing  10/14/2024 0836 by Rozina Ayon  Outcome: Progressing     Problem: SAFETY ADULT  Goal: Patient will remain free of  falls  Description: INTERVENTIONS:  - Educate patient/family on patient safety including physical limitations  - Instruct patient to call for assistance with activity   - Consult OT/PT to assist with strengthening/mobility   - Keep Call bell within reach  - Keep bed low and locked with side rails adjusted as appropriate  - Keep care items and personal belongings within reach  - Initiate and maintain comfort rounds  - Make Fall Risk Sign visible to staff  - Offer Toileting every 3 Hours, in advance of need  - Initiate/Maintain bed alarm  - Obtain necessary fall risk management equipment:   - Apply yellow socks and bracelet for high fall risk patients  - Consider moving patient to room near nurses station  10/14/2024 1337 by Rozina Ayon  Outcome: Adequate for Discharge  10/14/2024 1336 by Rozina Ayon  Outcome: Progressing  10/14/2024 0836 by Rozina Ayon  Outcome: Progressing  Goal: Maintain or return to baseline ADL function  Description: INTERVENTIONS:  -  Assess patient's ability to carry out ADLs; assess patient's baseline for ADL function and identify physical deficits which impact ability to perform ADLs (bathing, care of mouth/teeth, toileting, grooming, dressing, etc.)  - Assess/evaluate cause of self-care deficits   - Assess range of motion  - Assess patient's mobility; develop plan if impaired  - Assess patient's need for assistive devices and provide as appropriate  - Encourage maximum independence but intervene and supervise when necessary  - Involve family in performance of ADLs  - Assess for home care needs following discharge   - Consider OT consult to assist with ADL evaluation and planning for discharge  - Provide patient education as appropriate  10/14/2024 1337 by Rozina Ayon  Outcome: Adequate for Discharge  10/14/2024 1336 by Rozina Ayon  Outcome: Progressing  10/14/2024 0836 by Rozina Ayon  Outcome: Progressing  Goal: Maintains/Returns to pre admission functional  level  Description: INTERVENTIONS:  - Perform AM-PAC 6 Click Basic Mobility/ Daily Activity assessment daily.  - Set and communicate daily mobility goal to care team and patient/family/caregiver.   - Collaborate with rehabilitation services on mobility goals if consulted  - Perform Range of Motion 3 times a day.  - Reposition patient every 2 hours.  - Dangle patient 3 times a day  - Stand patient 3 times a day  - Ambulate patient 3 times a day  - Out of bed to chair 3 times a day   - Out of bed for meals 3 times a day  - Out of bed for toileting  - Record patient progress and toleration of activity level   10/14/2024 1337 by Rozina Ayon  Outcome: Adequate for Discharge  10/14/2024 1336 by Rozina Ayon  Outcome: Progressing  10/14/2024 0836 by Rozina Ayon  Outcome: Progressing     Problem: DISCHARGE PLANNING  Goal: Discharge to home or other facility with appropriate resources  Description: INTERVENTIONS:  - Identify barriers to discharge w/patient and caregiver  - Arrange for needed discharge resources and transportation as appropriate  - Identify discharge learning needs (meds, wound care, etc.)  - Arrange for interpretive services to assist at discharge as needed  - Refer to Case Management Department for coordinating discharge planning if the patient needs post-hospital services based on physician/advanced practitioner order or complex needs related to functional status, cognitive ability, or social support system  10/14/2024 1337 by Rozina Ayon  Outcome: Adequate for Discharge  10/14/2024 1336 by Rozina Ayon  Outcome: Progressing  10/14/2024 0836 by Rozina Ayon  Outcome: Progressing     Problem: Knowledge Deficit  Goal: Patient/family/caregiver demonstrates understanding of disease process, treatment plan, medications, and discharge instructions  Description: Complete learning assessment and assess knowledge base.  Interventions:  - Provide teaching at level of understanding  - Provide  teaching via preferred learning methods  10/14/2024 1337 by Rozina Ayon  Outcome: Adequate for Discharge  10/14/2024 1336 by Rozina Ayon  Outcome: Progressing  10/14/2024 0836 by Rozina Ayon  Outcome: Progressing     Problem: Nutrition/Hydration-ADULT  Goal: Nutrient/Hydration intake appropriate for improving, restoring or maintaining nutritional needs  Description: Monitor and assess patient's nutrition/hydration status for malnutrition. Collaborate with interdisciplinary team and initiate plan and interventions as ordered.  Monitor patient's weight and dietary intake as ordered or per policy. Utilize nutrition screening tool and intervene as necessary. Determine patient's food preferences and provide high-protein, high-caloric foods as appropriate.     INTERVENTIONS:  - Monitor oral intake, urinary output, labs, and treatment plans  - Assess nutrition and hydration status and recommend course of action  - Evaluate amount of meals eaten  - Assist patient with eating if necessary   - Allow adequate time for meals  - Recommend/ encourage appropriate diets, oral nutritional supplements, and vitamin/mineral supplements  - Order, calculate, and assess calorie counts as needed  - Recommend, monitor, and adjust tube feedings and TPN/PPN based on assessed needs  - Assess need for intravenous fluids  - Provide specific nutrition/hydration education as appropriate  - Include patient/family/caregiver in decisions related to nutrition  10/14/2024 1337 by Rozina Ayon  Outcome: Adequate for Discharge  10/14/2024 1336 by Rozina Ayon  Outcome: Progressing  10/14/2024 0836 by Rozina Ayon  Outcome: Progressing     Problem: COPING  Goal: Pt/Family able to verbalize concerns and demonstrate effective coping strategies  Description: INTERVENTIONS:  - Assist patient/family to identify coping skills, available support systems and cultural and spiritual values  - Provide emotional support, including active  listening and acknowledgement of concerns of patient and caregivers  - Reduce environmental stimuli, as able  - Provide patient education  - Assess for spiritual pain/suffering and initiate spiritual care, including notification of Pastoral Care or [place] based community as needed  - Assess effectiveness of coping strategies  10/14/2024 1337 by Rozina Ayon  Outcome: Adequate for Discharge  10/14/2024 1336 by Rozina Ayon  Outcome: Progressing  10/14/2024 0836 by Rozina Ayon  Outcome: Progressing  Goal: Will report anxiety at manageable levels  Description: INTERVENTIONS:  - Administer medication as ordered  - Teach and encourage coping skills  - Provide emotional support  - Assess patient/family for anxiety and ability to cope  10/14/2024 1337 by Rozina Ayon  Outcome: Adequate for Discharge  10/14/2024 1336 by Rozina Ayon  Outcome: Progressing  10/14/2024 0836 by Rozina Ayon  Outcome: Progressing

## 2024-10-14 NOTE — ASSESSMENT & PLAN NOTE
- Initial EKG showed ST elevations in the inferior leads  - [facility] 10/12/2024 showed 99% ramus occlusion, status post PCI with SUKHI x 1, OM1 50%, moderate residual disease in the LCx and RCA  - Continue with aspirin 81 mg daily, Brilinta 90 mg twice daily and atorvastatin 80 mg daily  -TTE 10/13/2024 showed EF 48% with hypokinesis of the lateral wall, grade 1 DD, mildly dilated RV with normal function, mild LAE, mild MR, mild to moderate TR

## 2024-10-14 NOTE — UTILIZATION REVIEW
Initial Clinical Review    Admission: Date/Time/Statement:   Admission Orders (From admission, onward)       Ordered        10/13/24 0816  INPATIENT ADMISSION  Once                          Orders Placed This Encounter   Procedures    INPATIENT ADMISSION     Standing Status:   Standing     Number of Occurrences:   1     Order Specific Question:   Level of Care     Answer:   Critical Care [15]     Order Specific Question:   Estimated length of stay     Answer:   More than 2 Midnights     Order Specific Question:   Certification     Answer:   I certify that inpatient services are medically necessary for this patient for a duration of greater than two midnights. See H&P and MD Progress Notes for additional information about the patient's course of treatment.     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 male presents to the ED via EMS from home with c/o mid chest pain while sitting after a day of manual labor.  PMH: smoker, CAD, HTN, HLD, MI x 12 yr ago, hypercholesterolemia.  In the ED he went for emergent cardiac cath.  Labs - elevated troponins.  Imaging - SB with ST elevation. Treated with Brilinta, Heparin bolus and drip, IV Fentanyl, Zofran.  On exam ill-appearing, bradycardic.  Normal breath sounds.  Admitted to INPATIENT status with STEMI to Critical Care - urgent Cardiac cath with SUKHI x1 - statin, Metoprolol with hold parameters, Heparin drip.  On exam post cath pt was painfree, no  c/o SOB, nausea.    +++++++++++++++++++  10/12 Cardiac Cath   STEMI  Impression:     S/P SUKHI x 1 to the Ramus lesion that was 99% stenosed and was the culprit for his STEMI    LVEDP is mildly elevated without gradient on LV-AO pullback  Recommendation:  Recommendation TTE to assess cardiac structure and function  Cont GDMT optimization for CAD  DAPT for at least 12 months given PCI/ACS presentation  P2Y12i monotherapy thereafter   on med compliance  Aggressive risk factor reduction   on diet/lifestyle modification  Cardiac rehab upon discharge   ++++++++++++++++++++    Date: 10/13   Day 2:   STEMI s/p Cath w/ SUKHI - doing well post cath, hemodynamically stable, c/o some back pain, muscular in nature.  Adequate UO, ambulating, glucose well controlled.  Continue Brilinta, ASA, statin. Echo pending.  Downgraded to Med Surg today. Continue tele, starting Lisionpril today. Echo pending.  Cardiac rehab on d/c. On exam feels well, eating, CP free, some nausea post breakfast, remains bradycardic. Heparin drip and IV fluids d/c today.     Date: 10/14  Day 3: Has surpassed a 2nd midnight with active treatments and services.  STEMI s/p Cath w/ SUKHI - continue Lisinopril, will d/c Toprol XL d/t bradycardia. On exam did not sleep well, no c/o CP, SOB, abd pain, vomiting, no elevated JVD, normal breath and bowel sounds.     ED Treatment-Medication Administration from 10/12/2024 2053 to 10/12/2024 2134         Date/Time Order Dose Route Action     10/12/2024 2109 ticagrelor (BRILINTA) tablet 180 mg 180 mg Oral Given     10/12/2024 2110 heparin (porcine) injection 4,000 Units 4,000 Units Intravenous Given     10/12/2024 2113 heparin (porcine) 25,000 units in 0.45% NaCl 250 mL infusion (premix) 12 Units/kg/hr Intravenous New Bag     10/12/2024 2115 fentaNYL injection 50 mcg 50 mcg Intravenous Given     10/12/2024 2116 ondansetron (ZOFRAN) injection 4 mg 4 mg Intravenous Given            Scheduled Medications:  aspirin,  81 mg, Oral, Daily  atorvastatin, 80 mg, Oral, Daily With Dinner  lisinopril, 5 mg, Oral, Daily  ticagrelor, 90 mg, Oral, Q12H GEOFF      Continuous IV Infusions:  sodium chloride, 75 mL/hr, Intravenous, Continuous      PRN Meds:  acetaminophen, 975 mg, Oral, Q6H PRN - x 2 10/13  nitroglycerin, 0.4 mg, Sublingual, Once PRN  sodium chloride (PF), 3 mL, Intravenous, Q1H PRN      ED Triage Vitals [10/12/24 2057]   Temperature Pulse Respirations Blood Pressure SpO2 Pain Score   (!) 97.4 °F (36.3 °C) 56 20 141/93 100 % 6     Weight (last 2 days)       Date/Time Weight    10/13/24 0845 82.1 (181)    10/12/24 2240 82.4 (181.66)    10/12/24 2057 84.9 (187.17)            Vital Signs (last 3 days)       Date/Time Temp Pulse Resp BP MAP (mmHg) SpO2 Calculated FIO2 (%) - Nasal Cannula Nasal Cannula O2 Flow Rate (L/min) O2 Device Patient Position - Orthostatic VS [place] Coma Scale Score Pain    10/14/24 1122 98 °F (36.7 °C) 62 18 142/95 107 99 % -- -- None (Room air) Lying -- --    10/14/24 0743 97.1 °F (36.2 °C) 63 18 143/90 102 99 % -- -- None (Room air) Lying -- --    10/14/24 0740 -- -- -- -- -- -- -- -- -- -- 15 No Pain    10/14/24 0217 98 °F (36.7 °C) 55 16 121/73 92 98 % -- -- None (Room air) Lying -- --    10/13/24 2237 97.7 °F (36.5 °C) 55 16 109/67 82 98 % -- -- None (Room air) Lying -- --    10/13/24 1950 -- -- -- -- -- -- -- -- -- -- -- 5    10/13/24 1926 97.7 °F (36.5 °C) 55 16 127/79 97 98 % -- -- None (Room air) Lying -- --    10/13/24 1920 -- -- -- -- -- -- -- -- -- -- 15 --    10/13/24 1600 -- 49 15 135/70 99 97 % -- -- -- -- -- --    10/13/24 1500 -- 71 16 133/89 104 97 % -- -- -- -- -- --    10/13/24 1400 -- 49 15 158/82 115 97 % -- -- -- -- -- --    10/13/24 1306 -- -- -- -- -- -- -- -- -- -- -- 5    10/13/24 1300 -- 50 12 157/95 121 96 % -- -- -- -- -- --    10/13/24 1200 -- 51 17 139/88 109 98 % -- -- -- -- 15 --    10/13/24 1100 -- 53 17 148/90 114 97 % -- -- -- -- -- --    10/13/24 1000 -- 49 19 155/86 115  98 % -- -- -- -- -- --    10/13/24 0900 -- 48 17 142/82 106 97 % -- -- -- -- -- --    10/13/24 0845 -- 48 -- 137/84 -- -- -- -- -- -- -- --    10/13/24 0800 98.1 °F (36.7 °C) 54 16 137/84 106 97 % -- -- -- -- 15 No Pain    10/13/24 0700 -- 49 13 129/91 106 98 % -- -- -- -- -- --    10/13/24 0600 -- 48 13 141/91 110 97 % -- -- -- Lying -- --    10/13/24 0517 -- -- -- -- -- -- -- -- -- -- -- 3    10/13/24 0500 -- 50 12 147/89 113 96 % -- -- None (Room air) Lying -- --    10/13/24 0400 97.7 °F (36.5 °C) 47 13 148/94 114 97 % -- -- None (Room air) Lying 15 No Pain    10/13/24 0300 -- 62 37 135/84 103 97 % -- -- None (Room air) Lying -- --    10/13/24 0200 -- 46 14 133/83 104 97 % -- -- None (Room air) Lying -- --    10/13/24 0130 -- 46 13 131/80 101 96 % -- -- -- Lying -- --    10/13/24 0100 -- 47 17 127/80 99 97 % -- -- -- Lying -- --    10/13/24 0045 -- 49 14 128/79 99 98 % -- -- -- Lying -- --    10/13/24 0030 -- 47 15 119/78 93 98 % -- -- -- Lying -- --    10/13/24 0015 -- 47 16 122/76 94 97 % -- -- -- Lying -- --    10/13/24 0000 -- -- -- -- -- -- -- -- -- -- 15 --    10/12/24 2300 -- -- -- -- -- -- -- -- -- -- 15 No Pain    10/12/24 2247 -- 52 13 -- -- 100 % -- -- None (Room air) -- -- --    10/12/24 2240 97.8 °F (36.6 °C) 55 18 99/59 72 97 % 28 2 L/min Nasal cannula -- -- No Pain    10/12/24 21:49:36 -- -- -- -- -- 100 % 28 2 L/min Nasal cannula -- -- --    10/12/24 21:34:56 -- 56 20 118/92 -- 100 % -- -- -- -- -- --    10/12/24 21:32:32 -- 69 20 117/91 101 99 % -- -- None (Room air) Sitting -- --    10/12/24 21:22:36 -- 47 20 144/95 -- 100 % -- -- -- -- -- --    10/12/24 21:17:56 -- 51 20 144/92 -- 100 % -- -- -- -- -- --    10/12/24 2115 -- -- -- -- -- -- -- -- -- -- -- 6    10/12/24 21:14:46 -- 48 20 143/94 -- 100 % -- -- -- -- -- --    10/12/24 2057 97.4 °F (36.3 °C) 56 20 141/93 112 100 % -- -- None (Room air) Sitting -- 6              Pertinent Labs/Diagnostic Test Results:   Radiology:  No orders to  display     Cardiology:  Echo complete w/ contrast if indicated   Final Result by Arjun Barton MD (10/13 6834)        Left Ventricle: Left ventricular cavity size is normal. Wall thickness    is mildly increased. There is concentric remodeling. The left ventricular    ejection fraction is 48%. Systolic function is mildly reduced. Diastolic    function is mildly abnormal, consistent with grade I (abnormal)    relaxation.     The following segments are hypokinetic: basal anterior, basal    inferolateral, basal anterolateral, mid anterior, mid inferolateral and    mid anterolateral.     All other segments are normal.     Right Ventricle: Right ventricular cavity size is mildly dilated.    Systolic function is normal.     Left Atrium: The atrium is mildly dilated.     Mitral Valve: There is mild regurgitation.     Tricuspid Valve: There is mild to moderate regurgitation.         ECG 12 lead   Final Result by West Manrique MD (10/13 1200)   Sinus bradycardia   Otherwise normal ECG   When compared with ECG of 12-OCT-2024 21:01, (unconfirmed)   Criteria for Lateral infarct are no longer Present   Non-specific change in ST segment in Inferior leads   Nonspecific T wave abnormality, improved in Lateral leads   Confirmed by West Manrique ([id number]) on 10/13/2024 12:00:46 PM      Cardiac catheterization   Final Result by Rayne Miller DO (10/12 5087)        S/P SUKHI x 1 to the Ramus lesion that was 99% stenosed and was the    culprit for his STEMI     LVEDP is mildly elevated without gradient on LV-AO pullback         ECG 12 lead   Final Result by West Manrique MD (10/13 3770)   Age and gender specific ECG analysis   Marked sinus bradycardia   Lateral infarct , age undetermined   Abnormal ECG   When compared with ECG of 12-OCT-2024 20:57, (unconfirmed)   Criteria for Septal infarct are no longer Present   Lateral infarct is now Present   Non-specific change in ST segment in Lateral leads   Nonspecific T wave  abnormality now evident in Lateral leads   Confirmed by West Manrique ([id number]) on 10/13/2024 11:59:06 AM      ECG 12 lead   Final Result by West Manrique MD (10/13 1158)   Age and gender specific ECG analysis   Marked sinus bradycardia   Septal infarct , age undetermined   ST elevation, consider early repolarization, pericarditis, or injury   Abnormal ECG   When compared with ECG of 12-OCT-2024 20:56, (unconfirmed)   No significant change was found   Confirmed by West Manrique ([id number]) on 10/13/2024 11:58:37 AM      ECG 12 lead   Final Result by West Manrique MD (10/13 1150)   Age and gender specific ECG analysis    Marked sinus bradycardia   ST elevation, consider early repolarization, pericarditis, or injury   Abnormal ECG   No previous ECGs available   Confirmed by West Manrique ([id number]) on 10/13/2024 11:55:44 AM        GI:  No orders to display           Results from last 7 days   Lab Units 10/13/24  0514 10/12/24  2112   WBC Thousand/uL 8.71 8.24   HEMOGLOBIN g/dL 15.1 15.5   HEMATOCRIT % 42.5 43.8   PLATELETS Thousands/uL 266 312   TOTAL NEUT ABS Thousands/µL 5.94 3.88         Results from last 7 days   Lab Units 10/13/24  0514 10/12/24  2112   SODIUM mmol/L 139 140   POTASSIUM mmol/L 4.1 3.7   CHLORIDE mmol/L 107 102   CO2 mmol/L 27 29   ANION GAP mmol/L 5 9   BUN mg/dL 13 14   CREATININE mg/dL 0.90 1.21   EGFR ml/min/1.73sq m 89 62   CALCIUM mg/dL 9.0 9.9   MAGNESIUM mg/dL  --  2.2             Results from last 7 days   Lab Units 10/13/24  0514 10/12/24  2112   GLUCOSE RANDOM mg/dL 103 112         Results from last 7 days   Lab Units 10/13/24  0514   HEMOGLOBIN A1C % 5.7*   EAG mg/dl 117     Results from last 7 days   Lab Units 10/13/24  0101 10/12/24  2310 10/12/24  2112   HS TNI 0HR ng/L  --   --  104*   HS TNI 2HR ng/L  --  9,361*  --    HSTNI D2 ng/L  --  9,257*  --    HS TNI 4HR ng/L 4,378*  --   --    HSTNI D4 ng/L 4,274*  --   --          Results from last 7 days   Lab Units 10/12/24  2112   PROTIME  seconds 13.6   INR  1.02   PTT seconds 27     Present on Admission:   Coronary artery disease involving native coronary artery of native heart without angina pectoris   Pure hypercholesterolemia   Essential hypertension      Admitting Diagnosis: STEMI (ST elevation myocardial infarction) ([facility]) [I21.3]  Age/Sex: 64 y.o. male    Network Utilization Review Department  ATTENTION: Please call with any questions or concerns to [phone] and carefully listen to the prompts so that you are directed to the right person. All voicemails are confidential.   For Discharge needs, contact Care Management DC Support Team at [phone] opt. [phone]  Send all requests for admission clinical reviews, approved or denied determinations and any other requests to dedicated fax number below belonging to the campus where the patient is receiving treatment. List of dedicated fax numbers for the Facilities:  FACILITY NAME UR FAX NUMBER   ADMISSION DENIALS (Administrative/Medical Necessity) [phone]   DISCHARGE SUPPORT TEAM (NETWORK) [phone]   PARENT CHILD HEALTH (Maternity/NICU/Pediatrics) [phone]   [facility] [phone]   [facility] [phone]   [facility] [phone]   [facility] [phone]   [facility] [phone]   [facility] [phone]   [facility] [phone]   [facility] [phone]   [facility] [phone]   [facility] [phone]   [facility] [phone]   [facility] [phone]

## 2024-10-14 NOTE — CASE MANAGEMENT
Case Management Assessment & Discharge Planning Note    Patient name Richardson Paez  Location [facility] /E4 -* MRN [id number]  : 1960 Date 10/14/2024       Current Admission Date: 10/12/2024  Current Admission Diagnosis:STEMI (ST elevation myocardial infarction) (HCC)   Patient Active Problem List    Diagnosis Date Noted Date Diagnosed    Tobacco abuse 10/13/2024     STEMI (ST elevation myocardial infarction) (HCC) 10/12/2024     History of colon polyps 2023     Arthritis of right ankle 2023     Essential hypertension 2018     Allergic rhinitis 2018     Esophageal reflux 2013     Pure hypercholesterolemia 04/10/2013     Coronary artery disease involving native coronary artery of native heart without angina pectoris 2012       LOS (days): 1  Geometric Mean LOS (GMLOS) (days): 2  Days to GMLOS:0.9     OBJECTIVE:    Risk of Unplanned Readmission Score: 7.29         Current admission status: Inpatient       Preferred Pharmacy:   [id number] - [place], [place] - [address]  [address] [address]  Phone: [phone] Fax: [phone]    [facility] [address]  [facility],  [address] [place] [address],  [facility] [address]  Phone: [phone] Fax: [phone]    Primary Care Provider: Faisal Hernandez DO    Primary Insurance: AET  Secondary Insurance: BLUE CROSS  REP    ASSESSMENT:  Active Health Care Proxies    There are no active Health Care Proxies on file.       Advance Directives  Does patient have a Health Care POA?: No  Was patient offered paperwork?:  (Given 5 wishes)  Does patient currently have a Health Care decision maker?: Yes, please see Health Care Proxy section  Does patient have Advance Directives?: No  Was patient offered paperwork?: Yes (Given 5 wishes)  Primary Contact: Yuval Paezmisty (Spouse)  [phone] (Home Phone)         Readmission Root Cause  30 Day  Readmission: No    Patient Information  Admitted from:: Home  Mental Status: Alert  During Assessment patient was accompanied by: Spouse  Assessment information provided by:: Patient, Spouse  Primary Caregiver: Self  Support Systems: Self, Spouse/significant other  County of Residence: [place]  What city do you live in?: [place]  Type of Current Residence: Other (Comment) (Private residence)  Living Arrangements: Lives w/ Spouse/significant other    Activities of Daily Living Prior to Admission  Completes ADLs independently?: Yes  Ambulates independently?: Yes  Does patient use assisted devices?: No  Does patient currently own DME?: No         Patient Information Continued  Income Source: Pension/MCC  Does patient have prescription coverage?: Yes  Does patient receive dialysis treatments?: No  Does patient have a history of substance abuse?: No  Does patient have a history of Mental Health Diagnosis?: No         Means of Transportation  Means of Transport to Appts:: Drives Self      Social Determinants of Health (SDOH)      Flowsheet Row Most Recent Value   Housing Stability    In the past 12 months, how many times have you moved where you were living? 0   At any time in the past 12 months, were you homeless or living in a shelter (including now)? N   Transportation Needs    In the past 12 months, has lack of transportation kept you from medical appointments or from getting medications? no   In the past 12 months, has lack of transportation kept you from meetings, work, or from getting things needed for daily living? No   Food Insecurity    Within the past 12 months, you worried that your food would run out before you got the money to buy more. Never true   Within the past 12 months, the food you bought just didn't last and you didn't have money to get more. Never true   Utilities    In the past 12 months has the electric, gas, oil, or water company threatened to shut off services in your home? No             DISCHARGE DETAILS:    Discharge planning discussed with:: Pt and wife  Freedom of Choice: Yes  Comments - Freedom of Choice: Pt plans to go home.  CM contacted family/caregiver?: Yes  Were Treatment Team discharge recommendations reviewed with patient/caregiver?: Yes  Did patient/caregiver verbalize understanding of patient care needs?: Yes  Were patient/caregiver advised of the risks associated with not following Treatment Team discharge recommendations?: Yes    Contacts  Patient Contacts: Amelie Paez (Spouse)  [phone] (Home Phone)  Relationship to Patient:: Family  Contact Method: In Person  Reason/Outcome: Continuity of Care, Discharge Planning    Requested Home Health Care         Is the patient interested in HHC at discharge?: No    DME Referral Provided  Referral made for DME?: No         Would you like to participate in our [facility] service program?  : No - Declined    Treatment Team Recommendation: Home  Discharge Destination Plan:: Home  Transport at Discharge : Family                                      Additional Comments: CM met with the pt and wife.  Pt is alert and oriented.   Pt had just retired at the end of September.  He has been independent with all ADLS and still drives.  No DME.  No anticipated needs noted during the assessment.  Pt was aware that Deb would have a $50/mo co-pay and that his [facility] would not have it ready for 24 hours.  Per pt, Cardiology will be able to assist until Brilinta is ready.  Also spoke to pt and wife about his insurance.  He stated that [employer] was his primary, but at this time it should be Blue [employer] . [facility] has [employer] as Primary and his [facility] as secondary but ID number is missing one digit.  A copy of the card was obtained and CM will send message to Insurance verification to update.  Pt and wife agreed with this plan.  Pt anticipates dc later today.

## 2024-10-14 NOTE — DISCHARGE SUMMARY
DIscharge Summary - Hospitalist   Name: Richardson Paez 64 y.o. male I MRN: [id number]  Unit/Bed#: E4 -01 I Date of Admission: 10/12/2024   Date of Service: 10/14/2024 I Hospital Day: 1    Assessment & Plan  STEMI (ST elevation myocardial infarction) (HCC)  History of hypertension hyperlipidemia presented to the hospital for chest pain  Found to have STEMI underwent urgent cardiac catheterization  SUKHI to ramus lesion.    Lab Results   Component Value Date    HSTNI0 104 (H) 10/12/2024    HSTNI2 9,361 (H) 10/12/2024    HSTNI4 4,378 (H) 10/13/2024     Coronary artery disease involving native coronary artery of native heart without angina pectoris  CAD this admission requiring drug-eluting stenting.    Discharging with DAPT and atorvastatin.  No beta-blocker due to bradycardia.  Pure hypercholesterolemia  Discharging with atorvastatin 80 mg daily  Essential hypertension  Unable to use beta-blocker due to bradycardia  Due to CAD discharging with lisinopril 5 mg daily  Tobacco abuse      Medical Problems       Resolved Problems  Date Reviewed: 8/2/2023   None        Discharging Physician / Practitioner: Oracio Sy DO  PCP: Faisal Hernandez DO  Admission Date:   Admission Orders (From admission, onward)       Ordered        10/13/24 0816  INPATIENT ADMISSION  Once                        Discharge Date: 10/14/24    Consultations During Hospital Stay:  IP CONSULT TO CARDIOLOGY  IP CONSULT TO CASE MANAGEMENT     Procedures Performed:   Procedure(s) (LRB):  Cardiac pci (N/A)  Cardiac Left Heart Cath (Left)   Result Date: 10/12/2024  Narrative:   S/P SUKHI x 1 to the Ramus lesion that was 99% stenosed and was the culprit for his STEMI   LVEDP is mildly elevated without gradient on LV-AO pullback      Images:   Echo complete w/ contrast if indicated    Result Date: 10/13/2024  Narrative:   Left Ventricle: Left ventricular cavity size is normal. Wall thickness is mildly increased. There is concentric remodeling. The left  ventricular ejection fraction is 48%. Systolic function is mildly reduced. Diastolic function is mildly abnormal, consistent with grade I (abnormal) relaxation.   The following segments are hypokinetic: basal anterior, basal inferolateral, basal anterolateral, mid anterior, mid inferolateral and mid anterolateral.   All other segments are normal.   Right Ventricle: Right ventricular cavity size is mildly dilated. Systolic function is normal.   Left Atrium: The atrium is mildly dilated.   Mitral Valve: There is mild regurgitation.   Tricuspid Valve: There is mild to moderate regurgitation.     Lab Results: I have reviewed the following results:  Results from last 7 days   Lab Units 10/13/24  0514 10/12/24  2112   WBC Thousand/uL 8.71 8.24   HEMOGLOBIN g/dL 15.1 15.5   HEMATOCRIT % 42.5 43.8   MCV fL 90 89   PLATELETS Thousands/uL 266 312   INR   --  1.02     Results from last 7 days   Lab Units 10/13/24  0514 10/12/24  2112   SODIUM mmol/L 139 140   POTASSIUM mmol/L 4.1 3.7   CHLORIDE mmol/L 107 102   CO2 mmol/L 27 29   BUN mg/dL 13 14   CREATININE mg/dL 0.90 1.21   CALCIUM mg/dL 9.0 9.9   EGFR ml/min/1.73sq m 89 62   GLUCOSE RANDOM mg/dL 103 112         Results from last 7 days   Lab Units 10/13/24  0101 10/12/24  2310 10/12/24  2112   HS TNI 0HR ng/L  --   --  104*   HS TNI 2HR ng/L  --  9,361*  --    HS TNI 4HR ng/L 4,378*  --   --                   Results from last 7 days   Lab Units 10/13/24  0514   HEMOGLOBIN A1C % 5.7*             Results from last 7 days   Lab Units 10/13/24  0514   TRIGLYCERIDES mg/dL 334*   CHOLESTEROL mg/dL 180   LDL CALC mg/dL 81   HDL mg/dL 32*       Incidental Findings:      Test Results Pending at Discharge (will require follow up):      Reason for Admission:   Chest Pain (Coming in from home, via EMS, 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 male patient who originally presented to the hospital on 10/12/2024 due to chest pain.  He was a STEMI alert underwent emergent cardiac catheterization requiring SUKHI.  He was stabilized and transferred to medical service.  No beta-blocker due to resting bradycardia.   Cost of Brilinta was confirmed to be cost of Brilinta confirmed to be $47.  Medication sent [facility] to [facility] and the patient will pick them up.      Please see above list of diagnoses and related plan for additional information.     Condition at Discharge: stable     Discharge Day Visit / Exam:   Subjective: Patient seen and examined.  No acute respiratory issues.    Vitals: Blood Pressure: 142/95 (10/14/24 1122)  Pulse: 62 (10/14/24 1122)  Temperature: 98 °F (36.7 °C) (10/14/24 1122)  Temp Source: Temporal (10/14/24 1122)  Respirations: 18 (10/14/24 1122)  Height: 6' 3\" (190.5 cm) (10/13/24 0845)  Weight - Scale: 82.1 kg (181 lb) (10/13/24 0845)  SpO2: 99 % (10/14/24 1122)    Exam:   Physical Exam  Vitals reviewed.   Constitutional:       General: He is not in acute distress.  HENT:      Head: Atraumatic.   Cardiovascular:      Rate and Rhythm: Regular rhythm.   Pulmonary:      Effort: Pulmonary effort is normal.      Breath sounds: No wheezing.   Abdominal:      General: Bowel sounds are normal.      Palpations: Abdomen is soft.      Tenderness: There is no abdominal tenderness. There is no rebound.   Musculoskeletal:         General: No swelling.   Skin:     General: Skin is warm and dry.   Neurological:      General: No focal deficit present.      Mental Status: He is alert.      Cranial Nerves: No cranial nerve deficit.   Psychiatric:         Mood and Affect: Mood normal.       Discussion with Family: Wife at bedside    Discharge instructions/Information to patient and family:   See after visit summary for information provided to patient and family.      Provisions for Follow-Up Care:  See after visit summary for " information related to follow-up care and any pertinent home health orders.      Mobility at time of Discharge:  Basic Mobility Inpatient Raw Score: 24  JH-HLM Goal: 8: Walk 250 feet or more  JH-HLM Achieved: 8: Walk 250 feet ot more  JH-HLM Goal achieved. Continue to encourage appropriate mobility.    Disposition:   Home    Planned Readmission: No     Discharge Medications:  See after visit summary for reconciled discharge medications provided to patient and family.      Administrative Statements   I spent 35 minutes discharging the patient. This time was spent on the day of discharge. I had direct contact with the patient on the day of discharge. Greater than 50% of the total time was spent examining patient, answering all patient questions, arranging and discussing plan of care with patient as well as directly providing post-discharge instructions.  Additional time then spent on discharge activities.    **Please Note: This note may have been constructed using a voice recognition system**

## 2024-10-14 NOTE — ASSESSMENT & PLAN NOTE
History of hypertension hyperlipidemia presented to the hospital for chest pain  Found to have STEMI underwent urgent cardiac catheterization  SUKHI to ramus lesion.    Lab Results   Component Value Date    HSTNI0 104 (H) 10/12/2024    HSTNI2 9,361 (H) 10/12/2024    HSTNI4 4,378 (H) 10/13/2024

## 2024-10-14 NOTE — ASSESSMENT & PLAN NOTE
CAD this admission requiring drug-eluting stenting.    Discharging with DAPT and atorvastatin.  No beta-blocker due to bradycardia.

## 2024-10-14 NOTE — NURSING NOTE
IV removed. AVS given to and reviewed with patient. Questions addressed. Patient discharged.     Rozina DICKERSONN, RN

## 2024-10-14 NOTE — RESTORATIVE TECHNICIAN NOTE
Restorative Technician Note      Patient Name: Richardson Paez     Restorative Tech Visit Date: 10/14/24  Note Type: Mobility  Patient Position Upon Consult: Supine  Activity Performed: Ambulated; Range of motion  Patient Position at End of Consult: All needs within reach; Bedside chair

## 2024-10-15 NOTE — UTILIZATION REVIEW
NOTIFICATION OF ADMISSION DISCHARGE   This is a Notification of Discharge from [facility]. Please be advised that this patient has been discharge from our facility. Below you will find the admission and discharge date and time including the patient’s disposition.   UTILIZATION REVIEW CONTACT:  Janie Mcqueen  Utilization   Network Utilization Review Department  Phone: [phone] x carefully listen to the prompts. All voicemails are confidential.  Email: [email or web address]     ADMISSION INFORMATION  PRESENTATION DATE: 10/12/2024  8:53 PM  OBERVATION ADMISSION DATE: 10/12/2024 2235  INPATIENT ADMISSION DATE: 10/13/24  8:16 AM   DISCHARGE DATE: 10/14/2024  1:45 PM   DISPOSITION:Home/Self Care    Network Utilization Review Department  ATTENTION: Please call with any questions or concerns to [phone] and carefully listen to the prompts so that you are directed to the right person. All voicemails are confidential.   For Discharge needs, contact Care Management DC Support Team at [phone] opt. 2  Send all requests for admission clinical reviews, approved or denied determinations and any other requests to dedicated fax number below belonging to the campus where the patient is receiving treatment. List of dedicated fax numbers for the Facilities:  FACILITY NAME UR FAX NUMBER   ADMISSION DENIALS (Administrative/Medical Necessity) [phone]   DISCHARGE SUPPORT TEAM ([facility]) [phone]   PARENT CHILD HEALTH (Maternity/NICU/Pediatrics) [phone]   [facility] [phone]   [facility] [phone]   [facility] [phone]   [facility] [phone]   [facility] [phone]   [facility] [phone]   [facility] [phone]   [facility]  [phone]   [facility] [phone]   [facility] [phone]   [facility] [phone]   [facility] [phone]

## 2024-10-23 ENCOUNTER — OFFICE VISIT (OUTPATIENT)
Dept: INTERNAL MEDICINE CLINIC | Facility: CLINIC | Age: 64
End: 2024-10-23
Payer: COMMERCIAL

## 2024-10-23 VITALS
TEMPERATURE: 97.7 F | RESPIRATION RATE: 14 BRPM | HEART RATE: 80 BPM | DIASTOLIC BLOOD PRESSURE: 80 MMHG | SYSTOLIC BLOOD PRESSURE: 120 MMHG | WEIGHT: 177 LBS | HEIGHT: 75 IN | BODY MASS INDEX: 22.01 KG/M2

## 2024-10-23 DIAGNOSIS — I25.10 CORONARY ARTERY DISEASE INVOLVING NATIVE CORONARY ARTERY OF NATIVE HEART WITHOUT ANGINA PECTORIS: ICD-10-CM

## 2024-10-23 DIAGNOSIS — E78.00 PURE HYPERCHOLESTEROLEMIA: ICD-10-CM

## 2024-10-23 DIAGNOSIS — I21.3 ST ELEVATION MYOCARDIAL INFARCTION (STEMI), UNSPECIFIED ARTERY (HCC): Primary | ICD-10-CM

## 2024-10-23 DIAGNOSIS — R73.09 ELEVATED HEMOGLOBIN A1C: ICD-10-CM

## 2024-10-23 DIAGNOSIS — I10 ESSENTIAL HYPERTENSION: ICD-10-CM

## 2024-10-23 DIAGNOSIS — Z12.5 SCREENING FOR PROSTATE CANCER: ICD-10-CM

## 2024-10-23 PROCEDURE — 99495 TRANSJ CARE MGMT MOD F2F 14D: CPT | Performed by: INTERNAL MEDICINE

## 2024-10-23 NOTE — PROGRESS NOTES
Transition of Care Visit  Name: Richardson Paez      : 1960      MRN: [id number]  Encounter Provider: Faisal Hernandez DO  Encounter Date: 10/23/2024   Encounter department: [facility] INTERNAL MEDICINE [place]    Medical history of CAD status post PCI, arthritis of the right ankle     Here today for TCM appointment    Admitted at [facility] from 10/12 to 10/14.  Presented with chest pain.  Was STEMI alert and underwent emergent cardiac cath.  Finding of 99% stenosis of the ramus which was the culprit lesion.  Status post SUKHI x 1.     Was not started on beta-blocker due to resting bradycardia.    Since discharge has been doing okay at home.  He does not have any significant concerns today.  No recurrent chest pain    Assessment & Plan  ST elevation myocardial infarction (STEMI), unspecified artery (HCC)         Coronary artery disease involving native coronary artery of native heart without angina pectoris  History of coronary stent placement in , now with recent STEMI 2024 status post SUKHI  Continue DAPT for at least 1 year  Started on atorvastatin 80 mg daily.  He had previously been on atorvastatin 40 mg daily but had been taking intermittently due to myalgias.  Advised continuing with atorvastatin 80 mg daily for now but he will be discussing with cardiology might want to consider trial of rosuvastatin instead to see if he tolerates that better    Not started on beta-blocker in the hospital due to resting bradycardia  -Continue follow-up with cardiology as directed         Essential hypertension  Well-controlled  Continue lisinopril    Orders:    CBC and differential; Future    Comprehensive metabolic panel; Future    Pure hypercholesterolemia  Continue atorvastatin for now.  See plan for coronary artery disease    Orders:    Lipid Panel with Direct LDL reflex; Future    Screening for prostate cancer    Orders:    PSA, Total Screen; Future    Elevated hemoglobin A1c    Orders:     "Hemoglobin A1C; Future         History of Present Illness     Transitional Care Management Review:   Richardson Paez is a 64 y.o. male here for TCM follow up.     During the TCM phone call patient stated:  TCM Call       Date and time call was made  10/14/2024  2:26 PM    Patient was hospitialized at  [facility]    Date of Admission  10/12/24    Date of discharge  10/14/24    Diagnosis  tcm d/c 10/14/2024 STEMI (ST elevation myocardial infarction)    Disposition  Home    Were the patients medications reviewed and updated  Yes    Current Symptoms  None          TCM Call       Post hospital issues  None    Should patient be enrolled in anticoag monitoring?  No    Scheduled for follow up?  Yes    Did you obtain your prescribed medications  Yes    Do you need help managing your prescriptions or medications  No    Is transportation to your appointment needed  No    I have advised the patient to call PCP with any new or worsening symptoms  Becka Del Rio MA          HPI  Review of Systems  Objective     /80 (BP Location: Left arm, Patient Position: Sitting, Cuff Size: Standard)   Pulse 80   Temp 97.7 °F (36.5 °C)   Resp 14   Ht 6' 3\" (1.905 m)   Wt 80.3 kg (177 lb)   BMI 22.12 kg/m²     Physical Exam  Cardiovascular:      Rate and Rhythm: Normal rate and regular rhythm.      Heart sounds: No murmur heard.  Pulmonary:      Effort: Pulmonary effort is normal.      Breath sounds: Normal breath sounds. No wheezing or rales.       Medications have been reviewed by provider in current encounter      "

## 2024-10-23 NOTE — ASSESSMENT & PLAN NOTE
Continue atorvastatin for now.  See plan for coronary artery disease    Orders:    Lipid Panel with Direct LDL reflex; Future

## 2024-10-23 NOTE — ASSESSMENT & PLAN NOTE
Well-controlled  Continue lisinopril    Orders:    CBC and differential; Future    Comprehensive metabolic panel; Future

## 2024-10-23 NOTE — ASSESSMENT & PLAN NOTE
History of coronary stent placement in 2012, now with recent STEMI October 2024 status post SUKHI  Continue DAPT for at least 1 year  Started on atorvastatin 80 mg daily.  He had previously been on atorvastatin 40 mg daily but had been taking intermittently due to myalgias.  Advised continuing with atorvastatin 80 mg daily for now but he will be discussing with cardiology might want to consider trial of rosuvastatin instead to see if he tolerates that better    Not started on beta-blocker in the hospital due to resting bradycardia  -Continue follow-up with cardiology as directed

## 2024-10-24 PROBLEM — I50.22 HEART FAILURE WITH MID-RANGE EJECTION FRACTION (HFMEF) (HCC): Status: ACTIVE | Noted: 2024-10-24

## 2024-10-24 NOTE — PROGRESS NOTES
Richardson Paez  1960  [id number]  CARDIOVASC PHYSICIAN  [address] [facility] [address]  [phone]  [phone]    1. Coronary artery disease involving native coronary artery of native heart without angina pectoris  metoprolol succinate (TOPROL-XL) 25 mg 24 hr tablet      2. Heart failure with mid-range ejection fraction (HFmEF) ([facility])  Echo complete w/ contrast if indicated      3. Essential hypertension        4. Pure hypercholesterolemia  ezetimibe (ZETIA) 10 mg tablet    Lipid Panel With Direct LDL      5. STEMI (ST elevation myocardial infarction) ([facility])  atorvastatin (LIPITOR) 40 mg tablet    DISCONTINUED: atorvastatin (LIPITOR) 80 mg tablet        Assessment/Plan  CAD   - STEMI 10/12/24: showed 99% ramus occlusion, status post PCI with SUKHI x 1, OM1 50%, moderate residual disease in the LCx and RCA   - s/p STEMI and BMS to the Lcx on 04/12/12  -discharged on ASA 81 mg daily and Brilinta and atorvastatin 80 mg daily,was not discharged on a beta blocker due to bradycardia   - pulse improved today will start him on Toprol-XL 12.5 mg daily. Will be able to assess BP and pulse at cardiac rehab  - discussed importance of taking DAPT for the next year  - encouraged cardiac rehabilitation    HFmEF, etiology likely ICM  - TTE 10/13/24: LVEF 48%, systolic function mildly reduced, grade I DD, inferior and anterior hypokinesis, mild LAE, mild MR, mild to moderate TR (exercise stress 2018 showed EF 52%)  - on no GDMT other than lisinopril   - will start Toprol-XL 12.5 mg daily  - repeat echo in 3 months to assess function   HTN  - BP in office: 112/64 mmHg  - current regimen: lisinopril 5 mg daily   - will add Toprol-XL 12.5 mg daily.   HLD   - lipid panel 10/13/24: cholesterol 180, triglycerides 334, HDL 32, LDL 81  - currently on atorvastatin 80 mg daily  - goal LDL < 70  - admits to muscle cramping with increased dose. Will decrease his atorvastatin to 40 mg daily and add zetia 10 mg daily. If continues to have  leg cramping or LDL > 70 in 3 months would consider PSK9 inhibitor  - repeat lipid panel in 3 months   Prediabetes     RTO in 3 months after echo and lipid panel    Interval History:   This is a 65 y/o male with a PMH of CAD s/p BMS to the LCX in ,and SUKHI to the ramus, HFmEF (etiology ICM), HTN, HLD, and prediabetic who is presenting today following his STEMI. Pt was admitted to [facility] 10/12/24-10/14/24 with troponin > 9,000 and ST elevations on EKG. He underwent emergent PCI and had successful stenting to the ramus with 0% residual restenosis. He was discharged with DAPT and high intensity statin. Unable to start beta blocker due to bradycardia.    Pt states he has been feeling pretty good since his cardiac catheterization. He is complaint with all his medications. He admits to muscle cramping on atorvastatin usually about 6 hours after taking it, which keeps him up at night. He did not get this cramping when he was on atorvastatin 40 mg daily. He has been eating a low fat diet. He is taking DAPT, year refills already sent to the pharmacy. He was not discharged on a beta blocker due to bradycardia. His pulse to day is 85 bpm. He has an appointment set up with cardiac rehab. Denies anginal symptoms, SOB, PITT, lightheadedness, dizziness, edema, and syncope. Denies alcohol, tobacco, and drug use.    Social History     Socioeconomic History    Marital status: /Civil Union     Spouse name: Not on file    Number of children: Not on file    Years of education: Not on file    Highest education level: Not on file   Occupational History    Not on file   Tobacco Use    Smoking status: Former     Current packs/day: 0.00     Types: Cigarettes     Quit date:      Years since quittin.8    Smokeless tobacco: Never   Vaping Use    Vaping status: Never Used   Substance and Sexual Activity    Alcohol use: Yes     Comment: social    Drug use: Not on file    Sexual activity: Not on file   Other Topics Concern    Not on  file   Social History Narrative    Not on file     Social Determinants of Health     Financial Resource Strain: Not on file   Food Insecurity: No Food Insecurity (10/14/2024)    Nursing - Inadequate Food Risk Classification     Worried About Running Out of Food in the Last Year: Never true     Ran Out of Food in the Last Year: Never true     Ran Out of Food in the Last Year: Not on file   Transportation Needs: No Transportation Needs (10/14/2024)    PRAPARE - Transportation     Lack of Transportation (Medical): No     Lack of Transportation (Non-Medical): No   Physical Activity: Not on file   Stress: Not on file   Social Connections: Not on file   Intimate Partner Violence: Not on file   Housing Stability: Unknown (10/14/2024)    Housing Stability Vital Sign     Unable to Pay for Housing in the Last Year: Not on file     Number of Times Moved in the Last Year: 0     Homeless in the Last Year: No      Family History   Problem Relation Age of Onset    Heart disease Mother     Heart disease Father      Past Surgical History:   Procedure Laterality Date    APPENDECTOMY      CARDIAC CATHETERIZATION N/A 10/12/2024    Procedure: Cardiac pci;  Surgeon: Rayne Miller DO;  Location: [facility];  Service: Cardiology    CARDIAC CATHETERIZATION Left 10/12/2024    Procedure: Cardiac Left Heart Cath;  Surgeon: Rayne Miller DO;  Location: [facility];  Service: Cardiology    CORONARY ANGIOPLASTY WITH STENT PLACEMENT      HERNIA REPAIR         Current Outpatient Medications:     aspirin (ECOTRIN LOW STRENGTH) 81 mg EC tablet, Take 1 tablet (81 mg total) by mouth daily, Disp: 90 tablet, Rfl: 3    atorvastatin (LIPITOR) 40 mg tablet, Take 1 tablet (40 mg total) by mouth daily, Disp: 90 tablet, Rfl: 3    ezetimibe (ZETIA) 10 mg tablet, Take 1 tablet (10 mg total) by mouth daily, Disp: 90 tablet, Rfl: 3    lisinopril (ZESTRIL) 5 mg tablet, Take 1 tablet (5 mg total) by mouth daily, Disp: 90 tablet, Rfl: 0     metoprolol succinate (TOPROL-XL) 25 mg 24 hr tablet, Take 0.5 tablets (12.5 mg total) by mouth daily, Disp: 90 tablet, Rfl: 1    ticagrelor (Brilinta) 90 MG, Take 1 tablet (90 mg total) by mouth every 12 (twelve) hours, Disp: 60 tablet, Rfl: 11  Allergies   Allergen Reactions    Penicillins Rash       Labs:     Chemistry        Component Value Date/Time     12/03/2014 1320    K 4.1 10/13/2024 0514    K 3.9 12/03/2014 1320     10/13/2024 0514     12/03/2014 1320    CO2 27 10/13/2024 0514    CO2 28 12/03/2014 1320    BUN 13 10/13/2024 0514    BUN 10 12/03/2014 1320    CREATININE 0.90 10/13/2024 0514    CREATININE 0.89 12/03/2014 1320        Component Value Date/Time    CALCIUM 9.0 10/13/2024 0514    CALCIUM 9.3 12/03/2014 1320    ALKPHOS 94 06/26/2023 0809    ALKPHOS 85 07/18/2017 0822    AST 18 06/26/2023 0809    AST 20 07/18/2017 0822    ALT 22 06/26/2023 0809    ALT 17 07/18/2017 0822    BILITOT 0.7 07/18/2017 0822        Lab Results   Component Value Date    CHOL 143 07/18/2017    CHOL 122 (L) 10/11/2013     Lab Results   Component Value Date    HDL 32 (L) 10/13/2024    HDL 36 (L) 10/12/2024    HDL 34 (L) 06/26/2023     Lab Results   Component Value Date    LDLCALC 81 10/13/2024    LDLCALC 113 (H) 10/12/2024    LDLCALC 114 (H) 06/26/2023     Lab Results   Component Value Date    TRIG 334 (H) 10/13/2024    TRIG 271 (H) 10/12/2024    TRIG 232 (H) 06/26/2023     Imaging: Echo complete w/ contrast if indicated    Result Date: 10/13/2024  Narrative:   Left Ventricle: Left ventricular cavity size is normal. Wall thickness is mildly increased. There is concentric remodeling. The left ventricular ejection fraction is 48%. Systolic function is mildly reduced. Diastolic function is mildly abnormal, consistent with grade I (abnormal) relaxation.   The following segments are hypokinetic: basal anterior, basal inferolateral, basal anterolateral, mid anterior, mid inferolateral and mid anterolateral.   All  "other segments are normal.   Right Ventricle: Right ventricular cavity size is mildly dilated. Systolic function is normal.   Left Atrium: The atrium is mildly dilated.   Mitral Valve: There is mild regurgitation.   Tricuspid Valve: There is mild to moderate regurgitation.     Cardiac catheterization    Result Date: 10/12/2024  Narrative:   S/P SUKHI x 1 to the Ramus lesion that was 99% stenosed and was the culprit for his STEMI   LVEDP is mildly elevated without gradient on LV-AO pullback     Review of Systems   Constitutional: Negative for chills, diaphoresis, fever, malaise/fatigue and weight gain.   Cardiovascular:  Negative for chest pain, dyspnea on exertion, irregular heartbeat, leg swelling, near-syncope, orthopnea, palpitations, paroxysmal nocturnal dyspnea and syncope.   Respiratory:  Negative for cough, shortness of breath, sleep disturbances due to breathing, snoring and wheezing.    Skin:  Negative for rash.   Gastrointestinal:  Negative for bloating, abdominal pain and nausea.   Neurological:  Negative for dizziness and light-headedness.     Vitals:    10/29/24 1351   BP: 112/64   Pulse: 85   SpO2: 98%     Vitals:    10/29/24 1351   Weight: 81.2 kg (179 lb)     Height: 6' 3\" (190.5 cm)   Body mass index is 22.37 kg/m².    Physical Exam  Vitals and nursing note reviewed.   Constitutional:       General: He is not in acute distress.     Appearance: Normal appearance. He is not ill-appearing.   HENT:      Head: Normocephalic and atraumatic.      Nose: Nose normal.      Mouth/Throat:      Mouth: Mucous membranes are moist.   Eyes:      Conjunctiva/sclera: Conjunctivae normal.   Cardiovascular:      Rate and Rhythm: Normal rate and regular rhythm.      Pulses:           Radial pulses are 2+ on the right side and 2+ on the left side.      Heart sounds: S1 normal and S2 normal. No murmur heard.  Pulmonary:      Effort: Pulmonary effort is normal. No respiratory distress.      Breath sounds: Normal breath " sounds. No stridor. No wheezing, rhonchi or rales.   Abdominal:      General: There is no distension.   Musculoskeletal:      Cervical back: Neck supple.      Right lower leg: No edema.      Left lower leg: No edema.   Skin:     General: Skin is warm.      Capillary Refill: Capillary refill takes less than 2 seconds.   Neurological:      General: No focal deficit present.      Mental Status: He is alert.   Psychiatric:         Thought Content: Thought content normal.

## 2024-10-29 ENCOUNTER — OFFICE VISIT (OUTPATIENT)
Dept: CARDIOLOGY CLINIC | Facility: CLINIC | Age: 64
End: 2024-10-29
Payer: COMMERCIAL

## 2024-10-29 VITALS
HEIGHT: 75 IN | WEIGHT: 179 LBS | SYSTOLIC BLOOD PRESSURE: 112 MMHG | OXYGEN SATURATION: 98 % | HEART RATE: 85 BPM | DIASTOLIC BLOOD PRESSURE: 64 MMHG | BODY MASS INDEX: 22.26 KG/M2

## 2024-10-29 DIAGNOSIS — I25.10 CORONARY ARTERY DISEASE INVOLVING NATIVE CORONARY ARTERY OF NATIVE HEART WITHOUT ANGINA PECTORIS: Primary | ICD-10-CM

## 2024-10-29 DIAGNOSIS — I10 ESSENTIAL HYPERTENSION: ICD-10-CM

## 2024-10-29 DIAGNOSIS — I50.22 HEART FAILURE WITH MID-RANGE EJECTION FRACTION (HFMEF) (HCC): ICD-10-CM

## 2024-10-29 DIAGNOSIS — E78.00 PURE HYPERCHOLESTEROLEMIA: ICD-10-CM

## 2024-10-29 PROCEDURE — 99214 OFFICE O/P EST MOD 30 MIN: CPT

## 2024-10-29 RX ORDER — METOPROLOL SUCCINATE 25 MG/1
12.5 TABLET, EXTENDED RELEASE ORAL DAILY
Qty: 90 TABLET | Refills: 1 | Status: SHIPPED | OUTPATIENT
Start: 2024-10-29

## 2024-10-29 RX ORDER — ATORVASTATIN CALCIUM 40 MG/1
40 TABLET, FILM COATED ORAL DAILY
Qty: 90 TABLET | Refills: 3 | Status: SHIPPED | OUTPATIENT
Start: 2024-10-29

## 2024-10-29 RX ORDER — EZETIMIBE 10 MG/1
10 TABLET ORAL DAILY
Qty: 90 TABLET | Refills: 3 | Status: SHIPPED | OUTPATIENT
Start: 2024-10-29

## 2024-10-29 RX ORDER — ATORVASTATIN CALCIUM 80 MG/1
40 TABLET, FILM COATED ORAL DAILY
Qty: 90 TABLET | Refills: 0 | Status: SHIPPED | OUTPATIENT
Start: 2024-10-29 | End: 2024-10-29 | Stop reason: SDUPTHER

## 2024-10-30 NOTE — PROGRESS NOTES
CARDIAC REHABILITATION   ASSESSMENT AND INDIVIDUALIZED TREATMENT PLAN  INITIAL       Today's date: 10/30/2024   # of Exercise Sessions Completed: 1  Patient name: Richardson Paez      : 1960  Age: 64 y.o.       MRN: [id number]  Referring Physician: Oracio Sy DO  Cardiologist: Edison Gibbons MD   Provider: [place]  Clinician: Starr Hampton MS, CEP      Treatment is tailored to this patient's individual needs.  The ITP was reviewed with the patient and all questions were answered to their satisfaction.  Additional ITP documentation can be found electronically including daily and monthly exercise summaries, daily session notes with ECG summaries, education notes, daily medication reconciliation, and daily physician supervision.      Comments: Today is Richard's initial evaluation to begin Cardiac Rehab now 2 wks post STEMI/stent to 99% occlusion of ramus. 50% stenosis remaining of OM1 and moderate disease of RCA and LCx. He had experienced upset stomach and back pain that transitioned to his chest - that is when he knew he was having an MI. Richard has a history of STEMI/BMS to LCx 2012, HFmEF (48% EF), HTN, HLD, Prediabetes, and former smoker (quit ). He does not currently follow a formal exercise program at home. He has resumed all ADLs without limitations. Today, Richard completed an initial submaximal TM ETT. We also discussed current dietary habits and goals of heart healthy eating for lipid management. He has started with minimally reduced portion sizes due to reduced activity. Depression and anxiety were assessed with PHQ-9 and MELINA-7 questionnaires with scores of 2 and 2 respectively, suggesting  1-4 = Minimal Depression and  0-4  = Not anxious. When addressed, Richard denies having depression/anxiety symptoms and reports good social/emotional support.    Dx: No diagnosis found.    Description of Diagnosis: STEMI: showed 99% ramus occlusion, status post PCI with SUKHI x 1, OM1 50%, moderate residual  disease in the LCx and RCA   Date of onset: 10/12/24  Other Cardiac History: STEMI and BMS to the Lcx on 04/12/12 , HFmEF (48% EF)        ASSESSMENT    Medical History:   No past medical history on file.    Family History:  Family History   Problem Relation Age of Onset    Heart disease Mother     Heart disease Father        Allergies:   Penicillins    Current Medications:   Current Outpatient Medications   Medication Sig Dispense Refill    aspirin (ECOTRIN LOW STRENGTH) 81 mg EC tablet Take 1 tablet (81 mg total) by mouth daily 90 tablet 3    atorvastatin (LIPITOR) 40 mg tablet Take 1 tablet (40 mg total) by mouth daily 90 tablet 3    ezetimibe (ZETIA) 10 mg tablet Take 1 tablet (10 mg total) by mouth daily 90 tablet 3    lisinopril (ZESTRIL) 5 mg tablet Take 1 tablet (5 mg total) by mouth daily 90 tablet 0    metoprolol succinate (TOPROL-XL) 25 mg 24 hr tablet Take 0.5 tablets (12.5 mg total) by mouth daily 90 tablet 1    ticagrelor (Brilinta) 90 MG Take 1 tablet (90 mg total) by mouth every 12 (twelve) hours 60 tablet 11     No current facility-administered medications for this visit.       Medication compliance: Yes   Comments: Pt reports to be compliant with medications    Physical Limitations: R ankle arthritis, occ L shoulder bursitis    Fall Risk: Low   Comments: Ambulates with a steady gait with no assist device and Denies a fall in the past 6 months    Cultural needs: none      CAD Risk Factors:  Cholesterol: Yes  HTN: Yes  DM: Pre-diabetes  Obesity: No   Inactivity: Yes      EXERCISE ASSESSMENT:     Initial Fitness Assessment: Submaximal TM ETT:  Resting:  BP: 116/76  HR: 86 bpm, Exercise:  BP: 160/76  HR: 121 bpm, METs:  7.5, ECG Summary: NSR, rare PVC, Symptoms: None, and Test terminated at:  RPE 6      ECG INTERPRETATION:  NSR, rave PVC    Current Functional Status:  Occupation: retired -   ADL’s:resumed all ADLs  [place]: resumed all ADLs able to perform self-care resumed driving  Home  "exercise: none    SMART Exercise Goals:   10% improvement in functional capacity based on max METs achieved in initial fitness assessment  increased exercise capacity by 40% based on peak METs tolerated in cardiac rehab exercise session  maintain > 150 minutes per week of moderate intensity exercise    Patient Specific EXERCISE GOALS:       Start exercise plan at home  Join [employer] with wife to continue exercise    Functional Capacity Screening Tool:  [facility] Activity Status Index:  9.89 METs      PSYCHOSOCIAL ASSESSMENT:    Date of last Assessment:  10/31/24  Depression screening:  PHQ-9 = 2    Interpretation:  1-4 = Minimal Depression  Anxiety screening:  MELINA-7 = 2    Interpretation: 0-4  = Not anxious    Pt self-report of depression and anxiety   Patient reports they are coping well with good social support and denies depression or anxiety    Self-reported stress level:  1    SMART Psychosocial Goals:     Physical Fitness in [place] Score < 3, Pain in [place] Score < 3, Change in Health in [place] Score < 3 , feel less tired with more energy, and reduced feelings of restlessness    Patient Specific PSYCHOCOSOCIAL GOALS:    Improved thoughts of well being      Quality of Life Screen:  (Higher score indicates disease impact on QOL)  [facility] COOP score: 22/45     Social Support:   spouse, children, and friends     Psychosocial Assessment as it relates to rehabilitation:   Patient denies issues with his/her family or home life that may affect their rehabilitation efforts.       NUTRITION ASSESSMENT:    Initial Weight:  179.8 lb   Current Weight: 179.8 lb    Height:   Ht Readings from Last 1 Encounters:   10/29/24 6' 3\" (1.905 m)       Rate Your Plate Score: 64/81    Diabetes: Pre-diabetes  A1c: 5.7%    last measured: 10/13/24    Lipid management: Discussed diet and lipid management and Last lipid profile 10/13/24  Chol 180    HDL 32  LDL 81    Current Dietary Habits:  Pt changed diet habits with MI in " 2012 and has continued with that    SMART Nutrition Goals:   HDL >40, TRG <150, and Improved Rate Your Plate score  >64    Patient Specific NUTRITION GOALS:     1. Continue with heart healthy diet   2. Check updated lipid panel in 3 months (elevated triglycerides)      OTHER CORE COMPONENT ASSESSMENT:    Tobacco Use:     N/A: Pt has a remote history of smoking    Anginal Symptoms:  chest pressure and excessive diaphoresis   NTG use: No prescription    SMART Goals:   consistent, controlled resting BP < 130/80 and medication compliance    Patient Specific CORE COMPONENT GOALS:    No additional cardiac issues for 10+ years      INDIVIDUALIZED TREATMENT PLAN      EXERCISE GOALS and PLAN      Progress toward Exercise goals:   Reviewed Pt goals and determined plan of care    Exercise Intervention/plan:    education on home exercise guidelines, Group class: Risk Factors for Heart Disease, and start cardiac rehab 3 days/week    The patient was counseled on exercise guidelines to achieve a minimum of 150 mins/wk of moderate intensity (RPE 4-6) exercise and encouraged to add 1-2 days of exercise on opposite days of cardiac rehab as tolerated.       PHYSICIAN PRESCRIBED EXERCISE:    Current Aerobic Exercise Prescription:      Frequency: 3 days/week    Minutes: 30 - 40         METS: 5-7            HR: RHR +30-40bpm 116-126 bpm   RPE: 4-6         Modalities: Treadmill, UBE, Lifecycle, and Elliptical     Exercise workloads will be progressed gradually as tolerated, within limits of patient's ability, and according to the patient's   response to the exercise program.      Aerobic Exercise Prescription Plan for Progression   Frequency: 3 days/week of cardiac rehab       Supplement with home exercise 2+ days/wk as tolerated    Minutes: 40       >150 mins/wk of moderate intensity exercise   METS: 5-7   HR: RHR +30-40bpm     RPE: 4-6   Modalities: Treadmill, UBE, Lifecycle, Elliptical, and Rower    Strength training:  Will be added  following 2-3 weeks of monitored exercise sessions   Modalities: Leg Press, Chest Press, Pull Downs, Lateral Raise, Arm Extension, Arm Curl, Upright Rows, Front Raises, Shoulder Shrugs, and Calf Raises    Home Exercise: none    Exercise Education: benefit of exercise for CAD risk factors, AHA guidelines to achieve >150 mins/wk of moderate exercise, and RPE scale     Readiness to change: Preparation:  (Getting ready to change)       NUTRITION GOALS AND PLAN      Nutritional   Reviewed patient's Rate your Plate. Discussed key elements of heart healthy eating. Reviewed patient goals for dietary modifications and their clinical implications.  Reviewed most recent lipid profile.     Patient's progress toward Nutrition goals:    Reviewed Pt goals and determined plan of care      Nutrition Intervention/plan:   group class: Reading Food Labels and group Class: Heart Healthy Eating    Measurable goals were based Rate Your Plate Dietary Self-Assessment. These are the areas in which the patient could score higher on the assessment.  Goals include recommendations for a heart healthy diet based on American Heart Association.    Nutrition Education:   heart healthy eating principles  nutrition for  lipid management    Readiness to change: Action:  (Changing behavior)      PSYCHOSOCIAL GOALS AND PLAN    Psychosocial Assessment as it relates to rehabilitation:   Patient denies issues with his/her family or home life that may affect their rehabilitation efforts.     Patient's progress toward Psychosocial goals:    Reviewed Pt goals and determined plan of care    Psychosocial Intervention/plan:   Class: Stress and Your Health, Exercise, and Keep a positive mindset    Psychosocial Education: benefits of a positive support system and depression and CAD    Information to utilize [employer] was provided as well as contact information for counseling through  Behavioral Health and group psychotherapy groups available.    Readiness to  change: Action:  (Changing behavior)      OTHER CORE COMPONENTS GOALS and PLAN    Blood Pressure will be monitored throughout the program and cardiologist will be notified of elevated trends.      Tobacco Intervention/plan:   N/A:  Pt is a non-smoker    Progress toward Core Component goals:   Reviewed Pt goals and determined plan of care    Other Core Components Intervention:   group class: Understanding Heart Disease, group class: Common Heart Medications, medication compliance, engage in regular exercise, and monitor home BP    Group and Individual Education:   benefit of moderate intensity exercise for the heart and benefit of cardiac rehab    Readiness to change: Preparation:  (Getting ready to change)

## 2024-10-31 ENCOUNTER — CLINICAL SUPPORT (OUTPATIENT)
Dept: CARDIAC REHAB | Facility: CLINIC | Age: 64
End: 2024-10-31

## 2024-10-31 DIAGNOSIS — I21.3 STEMI (ST ELEVATION MYOCARDIAL INFARCTION) (HCC): Primary | ICD-10-CM

## 2024-11-04 ENCOUNTER — CLINICAL SUPPORT (OUTPATIENT)
Dept: CARDIAC REHAB | Facility: CLINIC | Age: 64
End: 2024-11-04
Payer: COMMERCIAL

## 2024-11-04 DIAGNOSIS — I21.3 ST ELEVATION MYOCARDIAL INFARCTION (STEMI), UNSPECIFIED ARTERY (HCC): Primary | ICD-10-CM

## 2024-11-04 PROCEDURE — 93798 PHYS/QHP OP CAR RHAB W/ECG: CPT

## 2024-11-06 ENCOUNTER — CLINICAL SUPPORT (OUTPATIENT)
Dept: CARDIAC REHAB | Facility: CLINIC | Age: 64
End: 2024-11-06
Payer: COMMERCIAL

## 2024-11-06 DIAGNOSIS — I21.3 ST ELEVATION MYOCARDIAL INFARCTION (STEMI), UNSPECIFIED ARTERY (HCC): Primary | ICD-10-CM

## 2024-11-06 PROCEDURE — 93798 PHYS/QHP OP CAR RHAB W/ECG: CPT

## 2024-11-07 ENCOUNTER — CLINICAL SUPPORT (OUTPATIENT)
Dept: CARDIAC REHAB | Facility: CLINIC | Age: 64
End: 2024-11-07
Payer: COMMERCIAL

## 2024-11-07 DIAGNOSIS — I21.3 ST ELEVATION MYOCARDIAL INFARCTION (STEMI), UNSPECIFIED ARTERY (HCC): Primary | ICD-10-CM

## 2024-11-07 PROCEDURE — 93798 PHYS/QHP OP CAR RHAB W/ECG: CPT

## 2024-11-08 ENCOUNTER — APPOINTMENT (OUTPATIENT)
Dept: CARDIAC REHAB | Facility: CLINIC | Age: 64
End: 2024-11-08
Payer: COMMERCIAL

## 2024-11-11 ENCOUNTER — CLINICAL SUPPORT (OUTPATIENT)
Dept: CARDIAC REHAB | Facility: CLINIC | Age: 64
End: 2024-11-11
Payer: COMMERCIAL

## 2024-11-11 DIAGNOSIS — I21.3 ST ELEVATION MYOCARDIAL INFARCTION (STEMI), UNSPECIFIED ARTERY (HCC): Primary | ICD-10-CM

## 2024-11-11 PROCEDURE — 93798 PHYS/QHP OP CAR RHAB W/ECG: CPT

## 2024-11-12 DIAGNOSIS — I25.10 CAD (CORONARY ARTERY DISEASE): ICD-10-CM

## 2024-11-13 ENCOUNTER — CLINICAL SUPPORT (OUTPATIENT)
Dept: CARDIAC REHAB | Facility: CLINIC | Age: 64
End: 2024-11-13
Payer: COMMERCIAL

## 2024-11-13 DIAGNOSIS — I21.3 ST ELEVATION MYOCARDIAL INFARCTION (STEMI), UNSPECIFIED ARTERY (HCC): Primary | ICD-10-CM

## 2024-11-13 PROCEDURE — 93798 PHYS/QHP OP CAR RHAB W/ECG: CPT

## 2024-11-14 DIAGNOSIS — I25.10 CAD (CORONARY ARTERY DISEASE): ICD-10-CM

## 2024-11-15 ENCOUNTER — CLINICAL SUPPORT (OUTPATIENT)
Dept: CARDIAC REHAB | Facility: CLINIC | Age: 64
End: 2024-11-15
Payer: COMMERCIAL

## 2024-11-15 DIAGNOSIS — I21.3 ST ELEVATION MYOCARDIAL INFARCTION (STEMI), UNSPECIFIED ARTERY (HCC): Primary | ICD-10-CM

## 2024-11-15 PROCEDURE — 93798 PHYS/QHP OP CAR RHAB W/ECG: CPT

## 2024-11-18 ENCOUNTER — CLINICAL SUPPORT (OUTPATIENT)
Dept: CARDIAC REHAB | Facility: CLINIC | Age: 64
End: 2024-11-18
Payer: COMMERCIAL

## 2024-11-18 DIAGNOSIS — I21.3 ST ELEVATION MYOCARDIAL INFARCTION (STEMI), UNSPECIFIED ARTERY (HCC): Primary | ICD-10-CM

## 2024-11-18 PROCEDURE — 93798 PHYS/QHP OP CAR RHAB W/ECG: CPT

## 2024-11-20 ENCOUNTER — CLINICAL SUPPORT (OUTPATIENT)
Dept: CARDIAC REHAB | Facility: CLINIC | Age: 64
End: 2024-11-20
Payer: COMMERCIAL

## 2024-11-20 DIAGNOSIS — I21.3 ST ELEVATION MYOCARDIAL INFARCTION (STEMI), UNSPECIFIED ARTERY (HCC): Primary | ICD-10-CM

## 2024-11-20 PROCEDURE — 93798 PHYS/QHP OP CAR RHAB W/ECG: CPT

## 2024-11-22 ENCOUNTER — CLINICAL SUPPORT (OUTPATIENT)
Dept: CARDIAC REHAB | Facility: CLINIC | Age: 64
End: 2024-11-22
Payer: COMMERCIAL

## 2024-11-22 DIAGNOSIS — I21.3 ST ELEVATION MYOCARDIAL INFARCTION (STEMI), UNSPECIFIED ARTERY (HCC): Primary | ICD-10-CM

## 2024-11-22 PROCEDURE — 93798 PHYS/QHP OP CAR RHAB W/ECG: CPT

## 2024-11-25 ENCOUNTER — CLINICAL SUPPORT (OUTPATIENT)
Dept: CARDIAC REHAB | Facility: CLINIC | Age: 64
End: 2024-11-25
Payer: COMMERCIAL

## 2024-11-25 DIAGNOSIS — I21.3 ST ELEVATION MYOCARDIAL INFARCTION (STEMI), UNSPECIFIED ARTERY (HCC): Primary | ICD-10-CM

## 2024-11-25 PROCEDURE — 93798 PHYS/QHP OP CAR RHAB W/ECG: CPT

## 2024-11-27 ENCOUNTER — CLINICAL SUPPORT (OUTPATIENT)
Dept: CARDIAC REHAB | Facility: CLINIC | Age: 64
End: 2024-11-27
Payer: COMMERCIAL

## 2024-11-27 DIAGNOSIS — I21.3 ST ELEVATION MYOCARDIAL INFARCTION (STEMI), UNSPECIFIED ARTERY (HCC): Primary | ICD-10-CM

## 2024-11-27 PROCEDURE — 93798 PHYS/QHP OP CAR RHAB W/ECG: CPT

## 2024-11-27 NOTE — PROGRESS NOTES
CARDIAC REHABILITATION   ASSESSMENT AND INDIVIDUALIZED TREATMENT PLAN  30 DAY      Today's date: 2024   # of Exercise Sessions Completed: 12  Patient name: Richardson Paez      : 1960  Age: 64 y.o.       MRN: [id number]  Referring Physician: Oracio Sy DO  Cardiologist: Edison Gibbons MD   Provider: [place]  Clinician: Sonya Shaffer MS, CEP      Treatment is tailored to this patient's individual needs.  The ITP was reviewed with the patient and all questions were answered to their satisfaction.  Additional ITP documentation can be found electronically including daily and monthly exercise summaries, daily session notes with ECG summaries, education notes, daily medication reconciliation, and daily physician supervision.      Comments:  Richard is doing well in cardiac rehab, attending 3x/week. He completes 40-45 mins of exercise at 4.4-5.4 METs (6.7 METs on the rower) plus weight training. Normal resting HR and BP, with peak reaching 140bpm and 154/82 during exercise. NSR on telemetry with occ PACs and PVCs noted. Richard does not have any complaints during exercise but continues to report muscle cramps due to his statin and hopes one day won't have to take it. Richard walks 2x/week for about 2-3 miles and remains active at home. 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 Richard has maintained his weight of 179 lbs. He states he eats out less often since his last MI. No concern of depression or anxiety at this time and reports good emotional/social support.     Dx:   Encounter Diagnosis   Name Primary?    ST elevation myocardial infarction (STEMI), unspecified artery (HCC) Yes       Description of Diagnosis: STEMI: showed 99% ramus occlusion, status post PCI with SUKHI x 1, OM1 50%, moderate residual disease in the LCx and RCA   Date of onset: 10/12/24  Other Cardiac History: STEMI and BMS to the Lcx on 12 , HFmEF (48% EF)        ASSESSMENT    Medical History:   No past medical history on  file.    Family History:  Family History   Problem Relation Age of Onset    Heart disease Mother     Heart disease Father        Allergies:   Penicillins    Current Medications:   Current Outpatient Medications   Medication Sig Dispense Refill    aspirin (ECOTRIN LOW STRENGTH) 81 mg EC tablet Take 1 tablet (81 mg total) by mouth daily 90 tablet 3    atorvastatin (LIPITOR) 40 mg tablet Take 1 tablet (40 mg total) by mouth daily 90 tablet 3    ezetimibe (ZETIA) 10 mg tablet Take 1 tablet (10 mg total) by mouth daily 90 tablet 3    lisinopril (ZESTRIL) 5 mg tablet Take 1 tablet (5 mg total) by mouth daily 90 tablet 0    metoprolol succinate (TOPROL-XL) 25 mg 24 hr tablet Take 0.5 tablets (12.5 mg total) by mouth daily 90 tablet 1    ticagrelor (Brilinta) 90 MG Take 1 tablet (90 mg total) by mouth every 12 (twelve) hours 180 tablet 1     No current facility-administered medications for this visit.       Medication compliance: Yes   Comments: Pt reports to be compliant with medications    Physical Limitations: R ankle arthritis, occ L shoulder bursitis    Fall Risk: Low   Comments: Ambulates with a steady gait with no assist device and Denies a fall in the past 6 months    Cultural needs: none      CAD Risk Factors:  Cholesterol: Yes  HTN: Yes  DM: Pre-diabetes  Obesity: No   Inactivity: Yes      EXERCISE ASSESSMENT:     Initial Fitness Assessment: Submaximal TM ETT:  Resting:  BP: 116/76  HR: 86 bpm, Exercise:  BP: 160/76  HR: 121 bpm, METs:  7.5, ECG Summary: NSR, rare PVC, Symptoms: None, and Test terminated at:  RPE 6      ECG INTERPRETATION:  NSR, occ PACs and PVCs    Current Functional Status:  Occupation: retired -   ADL’s:resumed all ADLs  [place]: resumed all ADLs able to perform self-care resumed driving  Home exercise: none    SMART Exercise Goals:   10% improvement in functional capacity based on max METs achieved in initial fitness assessment  increased exercise capacity by 40% based on peak  "METs tolerated in cardiac rehab exercise session  maintain > 150 minutes per week of moderate intensity exercise    Patient Specific EXERCISE GOALS:       Start exercise plan at home  Join [employer] with wife to continue exercise    Functional Capacity Screening Tool:  [facility] Activity Status Index:  9.89 METs      PSYCHOSOCIAL ASSESSMENT:    Date of last Assessment:  10/31/24  Depression screening:  PHQ-9 = 2    Interpretation:  1-4 = Minimal Depression  Anxiety screening:  MELINA-7 = 2    Interpretation: 0-4  = Not anxious    Pt self-report of depression and anxiety   Patient reports they are coping well with good social support and denies depression or anxiety    Self-reported stress level:  1    SMART Psychosocial Goals:     Physical Fitness in [place] Score < 3, Pain in [place] Score < 3, Change in Health in [place] Score < 3 , feel less tired with more energy, and reduced feelings of restlessness    Patient Specific PSYCHOCOSOCIAL GOALS:    Improved thoughts of well being      Quality of Life Screen:  (Higher score indicates disease impact on QOL)  [facility] COOP score: 22/45     Social Support:   spouse, children, and friends     Psychosocial Assessment as it relates to rehabilitation:   Patient denies issues with his/her family or home life that may affect their rehabilitation efforts.       NUTRITION ASSESSMENT:    Initial Weight:  179.8 lb   Current Weight: 179.6 lb    Height:   Ht Readings from Last 1 Encounters:   10/29/24 6' 3\" (1.905 m)       Rate Your Plate Score: 64/81    Diabetes: Pre-diabetes  A1c: 5.7%    last measured: 10/13/24    Lipid management: Discussed diet and lipid management and Last lipid profile 10/13/24  Chol 180    HDL 32  LDL 81    Current Dietary Habits:  Pt changed diet habits with MI in 2012 and has continued with that    SMART Nutrition Goals:   HDL >40, TRG <150, and Improved Rate Your Plate score  >64    Patient Specific NUTRITION GOALS:     1. Continue with heart " healthy diet   2. Check updated lipid panel in 3 months (elevated triglycerides)      OTHER CORE COMPONENT ASSESSMENT:    Tobacco Use:     N/A: Pt has a remote history of smoking    Anginal Symptoms:  chest pressure and excessive diaphoresis   NTG use: No prescription    SMART Goals:   consistent, controlled resting BP < 130/80 and medication compliance    Patient Specific CORE COMPONENT GOALS:    No additional cardiac issues for 10+ years      INDIVIDUALIZED TREATMENT PLAN      EXERCISE GOALS and PLAN      Progress toward Exercise goals:   Pt is progressing and showing improvement  toward the following goals:  attends cardiac rehab regularly 3x/week, reaching 40 mins of exercise; walks 2x/week for 2-3 miles and is active at home completing projects.  , Will continue to educate and progress as tolerated.    Exercise Intervention/plan:    education on home exercise guidelines and home exercise 30+ mins 2 days opposite CR    The patient was counseled on exercise guidelines to achieve a minimum of 150 mins/wk of moderate intensity (RPE 4-6) exercise and encouraged to add 1-2 days of exercise on opposite days of cardiac rehab as tolerated.       PHYSICIAN PRESCRIBED EXERCISE:    Current Aerobic Exercise Prescription:      Frequency: 3 days/week    Minutes: 40-45         METS: 4.4-5.4 (6.7 on the rower)            HR:  115-140   RPE: 4-6         Modalities: Treadmill, UBE, Lifecycle, Elliptical, and Rower     Exercise workloads will be progressed gradually as tolerated, within limits of patient's ability, and according to the patient's   response to the exercise program.      Aerobic Exercise Prescription Plan for Progression   Frequency: 3 days/week of cardiac rehab       Supplement with home exercise 2+ days/wk as tolerated    Minutes: 40       >150 mins/wk of moderate intensity exercise   METS: 5-7   HR: RHR +30-40bpm     RPE: 4-6   Modalities: Treadmill, UBE, Lifecycle, Elliptical, and Rower    Strength training:   2-3 days / week  12-15 repetitions  1-2 sets per modality    Modalities: Leg Press, Chest Press, Arm Extension, Arm Curl, Upright Rows, and Calf Raises    Home Exercise: Type: walking, Frequency: 2 days/week, Distance 2-3 miles    Exercise Education: benefit of exercise for CAD risk factors, AHA guidelines to achieve >150 mins/wk of moderate exercise, RPE scale, and Group class: Risk Factors for Heart Disease     Readiness to change: Action:  (Changing behavior)      NUTRITION GOALS AND PLAN      Nutritional   Reviewed patient's Rate your Plate. Discussed key elements of heart healthy eating. Reviewed patient goals for dietary modifications and their clinical implications.  Reviewed most recent lipid profile.     Patient's progress toward Nutrition goals:    Pt is progressing and showing improvement  toward the following goals:  reports eating out less often. Maintained weight 179 lbs.  , Will continue to educate and progress as tolerated.      Nutrition Intervention/plan:   group class: Reading Food Labels and group Class: Heart Healthy Eating    Measurable goals were based Rate Your Plate Dietary Self-Assessment. These are the areas in which the patient could score higher on the assessment.  Goals include recommendations for a heart healthy diet based on American Heart Association.    Nutrition Education:   heart healthy eating principles  nutrition for  lipid management    Readiness to change: Action:  (Changing behavior)      PSYCHOSOCIAL GOALS AND PLAN    Psychosocial Assessment as it relates to rehabilitation:   Patient denies issues with his/her family or home life that may affect their rehabilitation efforts.     Patient's progress toward Psychosocial goals:    Pt is progressing and showing improvement  toward the following goals:  no concerns of depression or anxiety at this time.  , Will continue to educate and progress as tolerated.    Psychosocial Intervention/plan:   Exercise and Keep a positive  mindset    Psychosocial Education: benefits of a positive support system, depression and CAD, and class:  Stress and Your Health     Information to utilize [employer] was provided as well as contact information for counseling through  Behavioral [facility] and group psychotherapy groups available.    Readiness to change: Action:  (Changing behavior)      OTHER CORE COMPONENTS GOALS and PLAN    Blood Pressure will be monitored throughout the program and cardiologist will be notified of elevated trends.      Tobacco Intervention/plan:   N/A:  Pt is a non-smoker    Progress toward Core Component goals:   Pt is progressing and showing improvement  toward the following goals:  normal resting BP with appropriate response to exercise; 100% medication compliance.  , Will continue to educate and progress as tolerated.    Other Core Components Intervention:   medication compliance, engage in regular exercise, and monitor home BP    Group and Individual Education:  group class: Understanding Heart Disease, group class:  Common Heart Medications, and benefit of moderate intensity exercise for the heart and benefit of cardiac rehab    Readiness to change: Action:  (Changing behavior)

## 2024-11-29 ENCOUNTER — CLINICAL SUPPORT (OUTPATIENT)
Dept: CARDIAC REHAB | Facility: CLINIC | Age: 64
End: 2024-11-29
Payer: COMMERCIAL

## 2024-11-29 DIAGNOSIS — I21.3 ST ELEVATION MYOCARDIAL INFARCTION (STEMI), UNSPECIFIED ARTERY (HCC): Primary | ICD-10-CM

## 2024-11-29 PROCEDURE — 93798 PHYS/QHP OP CAR RHAB W/ECG: CPT

## 2024-12-02 ENCOUNTER — CLINICAL SUPPORT (OUTPATIENT)
Dept: CARDIAC REHAB | Facility: CLINIC | Age: 64
End: 2024-12-02
Payer: COMMERCIAL

## 2024-12-02 DIAGNOSIS — I21.3 ST ELEVATION MYOCARDIAL INFARCTION (STEMI), UNSPECIFIED ARTERY (HCC): Primary | ICD-10-CM

## 2024-12-02 PROCEDURE — 93798 PHYS/QHP OP CAR RHAB W/ECG: CPT

## 2024-12-04 ENCOUNTER — CLINICAL SUPPORT (OUTPATIENT)
Dept: CARDIAC REHAB | Facility: CLINIC | Age: 64
End: 2024-12-04
Payer: COMMERCIAL

## 2024-12-04 DIAGNOSIS — I21.3 ST ELEVATION MYOCARDIAL INFARCTION (STEMI), UNSPECIFIED ARTERY (HCC): Primary | ICD-10-CM

## 2024-12-04 PROCEDURE — 93798 PHYS/QHP OP CAR RHAB W/ECG: CPT

## 2024-12-06 ENCOUNTER — CLINICAL SUPPORT (OUTPATIENT)
Dept: CARDIAC REHAB | Facility: CLINIC | Age: 64
End: 2024-12-06
Payer: COMMERCIAL

## 2024-12-06 DIAGNOSIS — I21.3 ST ELEVATION MYOCARDIAL INFARCTION (STEMI), UNSPECIFIED ARTERY (HCC): Primary | ICD-10-CM

## 2024-12-06 PROCEDURE — 93798 PHYS/QHP OP CAR RHAB W/ECG: CPT

## 2024-12-09 ENCOUNTER — CLINICAL SUPPORT (OUTPATIENT)
Dept: CARDIAC REHAB | Facility: CLINIC | Age: 64
End: 2024-12-09
Payer: COMMERCIAL

## 2024-12-09 DIAGNOSIS — I21.3 ST ELEVATION MYOCARDIAL INFARCTION (STEMI), UNSPECIFIED ARTERY (HCC): Primary | ICD-10-CM

## 2024-12-09 PROCEDURE — 93798 PHYS/QHP OP CAR RHAB W/ECG: CPT

## 2024-12-11 ENCOUNTER — CLINICAL SUPPORT (OUTPATIENT)
Dept: CARDIAC REHAB | Facility: CLINIC | Age: 64
End: 2024-12-11
Payer: COMMERCIAL

## 2024-12-11 DIAGNOSIS — I21.3 ST ELEVATION MYOCARDIAL INFARCTION (STEMI), UNSPECIFIED ARTERY (HCC): Primary | ICD-10-CM

## 2024-12-11 PROCEDURE — 93798 PHYS/QHP OP CAR RHAB W/ECG: CPT

## 2024-12-13 ENCOUNTER — CLINICAL SUPPORT (OUTPATIENT)
Dept: CARDIAC REHAB | Facility: CLINIC | Age: 64
End: 2024-12-13
Payer: COMMERCIAL

## 2024-12-13 DIAGNOSIS — I21.3 ST ELEVATION MYOCARDIAL INFARCTION (STEMI), UNSPECIFIED ARTERY (HCC): Primary | ICD-10-CM

## 2024-12-13 PROCEDURE — 93798 PHYS/QHP OP CAR RHAB W/ECG: CPT

## 2024-12-16 ENCOUNTER — CLINICAL SUPPORT (OUTPATIENT)
Dept: CARDIAC REHAB | Facility: CLINIC | Age: 64
End: 2024-12-16
Payer: COMMERCIAL

## 2024-12-16 DIAGNOSIS — I21.3 ST ELEVATION MYOCARDIAL INFARCTION (STEMI), UNSPECIFIED ARTERY (HCC): Primary | ICD-10-CM

## 2024-12-16 DIAGNOSIS — I25.10 CAD (CORONARY ARTERY DISEASE): ICD-10-CM

## 2024-12-16 PROCEDURE — 93798 PHYS/QHP OP CAR RHAB W/ECG: CPT

## 2024-12-17 DIAGNOSIS — I25.10 CAD (CORONARY ARTERY DISEASE): ICD-10-CM

## 2024-12-18 ENCOUNTER — CLINICAL SUPPORT (OUTPATIENT)
Dept: CARDIAC REHAB | Facility: CLINIC | Age: 64
End: 2024-12-18
Payer: COMMERCIAL

## 2024-12-18 DIAGNOSIS — I21.3 ST ELEVATION MYOCARDIAL INFARCTION (STEMI), UNSPECIFIED ARTERY (HCC): Primary | ICD-10-CM

## 2024-12-18 PROCEDURE — 93798 PHYS/QHP OP CAR RHAB W/ECG: CPT

## 2024-12-20 ENCOUNTER — CLINICAL SUPPORT (OUTPATIENT)
Dept: CARDIAC REHAB | Facility: CLINIC | Age: 64
End: 2024-12-20
Payer: COMMERCIAL

## 2024-12-20 DIAGNOSIS — I21.3 ST ELEVATION MYOCARDIAL INFARCTION (STEMI), UNSPECIFIED ARTERY (HCC): Primary | ICD-10-CM

## 2024-12-20 PROCEDURE — 93798 PHYS/QHP OP CAR RHAB W/ECG: CPT

## 2024-12-23 ENCOUNTER — APPOINTMENT (OUTPATIENT)
Dept: CARDIAC REHAB | Facility: CLINIC | Age: 64
End: 2024-12-23
Payer: COMMERCIAL

## 2024-12-27 ENCOUNTER — CLINICAL SUPPORT (OUTPATIENT)
Dept: CARDIAC REHAB | Facility: CLINIC | Age: 64
End: 2024-12-27
Payer: COMMERCIAL

## 2024-12-27 ENCOUNTER — TELEPHONE (OUTPATIENT)
Age: 64
End: 2024-12-27

## 2024-12-27 DIAGNOSIS — I21.3 ST ELEVATION MYOCARDIAL INFARCTION (STEMI), UNSPECIFIED ARTERY (HCC): Primary | ICD-10-CM

## 2024-12-27 PROCEDURE — 93798 PHYS/QHP OP CAR RHAB W/ECG: CPT

## 2024-12-27 NOTE — TELEPHONE ENCOUNTER
Phone call to patient wife.  She states patient is having low bp readings in later afternoon and evenings. /40-50.  He becomes very tired around the same time.  His bp readings at cardiac rehab in mornings is running good 120's/60's  He was prescribed metoprolol succinate in October by STEFANIE Braun.  He only took it one week and stopped it.     His meds are  Lisinopril 5mg  Brilinta 90mg  Aspirin 81mg  Atovastatin 40mg  Ezetimibe 10mg     Questioned if it would help if he took lisinopril in the evening, supper or bedtime.    Pending office appointment with Dr. Gibbons 1/20/2025

## 2024-12-27 NOTE — TELEPHONE ENCOUNTER
Left message wife voice mail.    Per Dr. Gibbons  Patient can try taking lisinopril in the evening.  Suggested patient inform cardiac rehab he has adjusted the lisinopril to the evening so they can monitor him closely during rehab.    If any further questions to contact the office.

## 2024-12-27 NOTE — TELEPHONE ENCOUNTER
Caller: Charity wife    Doctor: KEARA Gibbons    Reason for call: Patient's wife called.  Please call her to discuss medications and blood pressure issues.  Please call her.    Thank you    Call back#: [phone]

## 2024-12-27 NOTE — PROGRESS NOTES
CARDIAC REHABILITATION   ASSESSMENT AND INDIVIDUALIZED TREATMENT PLAN  60 DAY      Today's date: 2024   # of Exercise Sessions Completed: 23  Patient name: Richardson Paez      : 1960  Age: 64 y.o.       MRN: [id number]  Referring Physician: Oracio Sy DO  Cardiologist: Edison Gibbons MD   Provider: [place]  Clinician: Starr Hampton MS, CEP      Treatment is tailored to this patient's individual needs.  The ITP was reviewed with the patient and all questions were answered to their satisfaction.  Additional ITP documentation can be found electronically including daily and monthly exercise summaries, daily session notes with ECG summaries, education notes, daily medication reconciliation, and daily physician supervision.      Comments:    Richard continues to do well in cardiac rehab, attending 3x/week. He tolerates 40 mins of exercise at 4.5-6.6 METs plus weight training, limited by joint and muscle pain. His bodily pain has not changed with reduced statin dose from 80 mg to 40 mg and he is looking forward to discussing this at his cardiologist visit the end of January. In cardiac rehab, normal resting HR and BP with normal hemodynamic response to exercise. NSR on telemetry with occ PACs and PVCs. Richard walks 2x/week for about 2-3 miles and remains active at home. He has maintained his weight at 178 - 179 lbs. No concern of depression or anxiety at this time and reports good emotional/social support.       Dx:   Encounter Diagnosis   Name Primary?    ST elevation myocardial infarction (STEMI), unspecified artery (HCC) Yes       Description of Diagnosis: STEMI: showed 99% ramus occlusion, status post PCI with SUKHI x 1, OM1 50%, moderate residual disease in the LCx and RCA   Date of onset: 10/12/24  Other Cardiac History: STEMI and BMS to the Lcx on 12 , HFmEF (48% EF)        ASSESSMENT    Medical History:   No past medical history on file.    Family History:  Family History   Problem Relation  Age of Onset    Heart disease Mother     Heart disease Father        Allergies:   Penicillins    Current Medications:   Current Outpatient Medications   Medication Sig Dispense Refill    aspirin (ECOTRIN LOW STRENGTH) 81 mg EC tablet Take 1 tablet (81 mg total) by mouth daily 90 tablet 3    atorvastatin (LIPITOR) 40 mg tablet Take 1 tablet (40 mg total) by mouth daily 90 tablet 3    ezetimibe (ZETIA) 10 mg tablet Take 1 tablet (10 mg total) by mouth daily 90 tablet 3    lisinopril (ZESTRIL) 5 mg tablet Take 1 tablet (5 mg total) by mouth daily 90 tablet 0    metoprolol succinate (TOPROL-XL) 25 mg 24 hr tablet Take 0.5 tablets (12.5 mg total) by mouth daily 90 tablet 1    ticagrelor (Brilinta) 90 MG Take 1 tablet (90 mg total) by mouth every 12 (twelve) hours 180 tablet 1     No current facility-administered medications for this visit.       Medication compliance: Yes   Comments: Pt reports to be compliant with medications    Physical Limitations: R ankle arthritis, occ L shoulder bursitis    Fall Risk: Low   Comments: Ambulates with a steady gait with no assist device and Denies a fall in the past 6 months    Cultural needs: none      CAD Risk Factors:  Cholesterol: Yes  HTN: Yes  DM: Pre-diabetes  Obesity: No   Inactivity: Yes      EXERCISE ASSESSMENT:     Initial Fitness Assessment: Submaximal TM ETT:  Resting:  BP: 116/76  HR: 86 bpm, Exercise:  BP: 160/76  HR: 121 bpm, METs:  7.5, ECG Summary: NSR, rare PVC, Symptoms: None, and Test terminated at:  RPE 6      ECG INTERPRETATION:  NSR, occ PACs and PVCs    Current Functional Status:  Occupation: retired -   ADL’s:resumed all ADLs  [place]: resumed all ADLs able to perform self-care resumed driving  Home exercise: none    SMART Exercise Goals:   10% improvement in functional capacity based on max METs achieved in initial fitness assessment  increased exercise capacity by 40% based on peak METs tolerated in cardiac rehab exercise session  maintain >  "150 minutes per week of moderate intensity exercise    Patient Specific EXERCISE GOALS:       Start exercise plan at home  Join [employer] with wife to continue exercise    Functional Capacity Screening Tool:  [facility] Activity Status Index:  9.89 METs      PSYCHOSOCIAL ASSESSMENT:    Date of last Assessment:  10/31/24  Depression screening:  PHQ-9 = 2    Interpretation:  1-4 = Minimal Depression  Anxiety screening:  MELINA-7 = 2    Interpretation: 0-4  = Not anxious    Pt self-report of depression and anxiety   Patient reports they are coping well with good social support and denies depression or anxiety    Self-reported stress level:  1    SMART Psychosocial Goals:     Physical Fitness in [place] Score < 3, Pain in [place] Score < 3, Change in Health in [place] Score < 3 , feel less tired with more energy, and reduced feelings of restlessness    Patient Specific PSYCHOCOSOCIAL GOALS:    Improved thoughts of well being      Quality of Life Screen:  (Higher score indicates disease impact on QOL)  [facility] COOP score: 22/45     Social Support:   spouse, children, and friends     Psychosocial Assessment as it relates to rehabilitation:   Patient denies issues with his/her family or home life that may affect their rehabilitation efforts.       NUTRITION ASSESSMENT:    Initial Weight:  179.8 lb   Current Weight: 178.6 lb    Height:   Ht Readings from Last 1 Encounters:   10/29/24 6' 3\" (1.905 m)       Rate Your Plate Score: 64/81    Diabetes: Pre-diabetes  A1c: 5.7%    last measured: 10/13/24    Lipid management: Last lipid profile 10/13/24  Chol 180    HDL 32  LDL 81    Current Dietary Habits:  Pt changed diet habits with MI in 2012 and has continued with that    SMART Nutrition Goals:   HDL >40, TRG <150, and Improved Rate Your Plate score  >64    Patient Specific NUTRITION GOALS:     1. Continue with heart healthy diet   2. Check updated lipid panel in 3 months (elevated triglycerides)      OTHER CORE COMPONENT " ASSESSMENT:    Tobacco Use:     N/A: Pt has a remote history of smoking    Anginal Symptoms:  chest pressure and excessive diaphoresis   NTG use: No prescription    SMART Goals:   consistent, controlled resting BP < 130/80 and medication compliance    Patient Specific CORE COMPONENT GOALS:    No additional cardiac issues for 10+ years      INDIVIDUALIZED TREATMENT PLAN      EXERCISE GOALS and PLAN      Progress toward Exercise goals:   Pt is progressing and showing improvement  toward the following goals:  attends cardiac rehab regularly 3x/week, reaching 40 mins of exercise; walks 2x/week for 2-3 miles and is active at home completing projects.      Exercise Intervention/plan:    home exercise 30+ mins 2 days opposite CR and gradual increase of exercise intensity in cardiac rehab (as tolerated)    PHYSICIAN PRESCRIBED EXERCISE:    Current Aerobic Exercise Prescription:      Frequency: 3 days/week    Supplement with home exercise 2+ days/wk as tolerated     Minutes: 40         METS: 4.4-6.6            HR:  102-142 bpm   RPE: 4-6         Modalities: Treadmill, UBE, Lifecycle, Elliptical, and Rower     Exercise workloads will be progressed gradually as tolerated, within limits of patient's ability, and according to the patient's response to the exercise program.      Aerobic Exercise Prescription Plan for Progression   Frequency: 3 days/week of cardiac rehab       Supplement with home exercise 2+ days/wk as tolerated    Minutes: 40       >150 mins/wk of moderate intensity exercise   METS: 5-7   HR: RHR +30-40bpm     RPE: 4-6   Modalities: Treadmill, UBE, Lifecycle, and Rower    Strength trainin-3 days / week  12-15 repetitions  1-2 sets per modality    Modalities: Leg Press, Chest Press, Arm Extension, Arm Curl, Upright Rows, and Calf Raises    Home Exercise: Type: walking, Frequency: 2 days/week, Distance 2-3 miles    Exercise Education: benefit of exercise for CAD risk factors, AHA guidelines to achieve >150  mins/wk of moderate exercise, RPE scale, and Group class: Risk Factors for Heart Disease     Readiness to change: Action:  (Changing behavior)      NUTRITION GOALS AND PLAN    Patient's progress toward Nutrition goals:    Pt is progressing and showing improvement  toward the following goals:  Maintaining weight 178-179 lbs.        Nutrition Intervention/plan:   Continue with heart healthy diet choices; repeat RYP score at discharge    Measurable goals were based Rate Your Plate Dietary Self-Assessment. These are the areas in which the patient could score higher on the assessment.  Goals include recommendations for a heart healthy diet based on American Heart Association.    Nutrition Education:   heart healthy eating principles  nutrition for  lipid management  group class: Heart Healthy Eating  group class:  Label Reading    Readiness to change: Maintenance: (Maintaining the behavior change)      PSYCHOSOCIAL GOALS AND PLAN    Psychosocial Assessment as it relates to rehabilitation:   Patient denies issues with his/her family or home life that may affect their rehabilitation efforts.     Patient's progress toward Psychosocial goals:    Pt is progressing and showing improvement  toward the following goals:  no concerns of depression or anxiety at this time.      Psychosocial Intervention/plan:   Exercise and Keep a positive mindset    Psychosocial Education: benefits of a positive support system, depression and CAD, and class:  Stress and Your Health     Readiness to change: Maintenance: (Maintaining the behavior change)      OTHER CORE COMPONENTS GOALS and PLAN    Tobacco Intervention/plan:   N/A:  Pt is a non-smoker    Progress toward Core Component goals:   Pt is progressing and showing improvement  toward the following goals:  normal resting BP with appropriate response to exercise; 100% medication compliance.  , Will continue to educate and progress as tolerated.    Other Core Components Intervention:    medication compliance, engage in regular exercise, and monitor home BP    Group and Individual Education:  group class: Understanding Heart Disease, group class:  Common Heart Medications, and benefit of moderate intensity exercise for the heart and benefit of cardiac rehab    Readiness to change: Maintenance: (Maintaining the behavior change)

## 2024-12-30 ENCOUNTER — CLINICAL SUPPORT (OUTPATIENT)
Dept: CARDIAC REHAB | Facility: CLINIC | Age: 64
End: 2024-12-30
Payer: COMMERCIAL

## 2024-12-30 DIAGNOSIS — I21.3 ST ELEVATION MYOCARDIAL INFARCTION (STEMI), UNSPECIFIED ARTERY (HCC): Primary | ICD-10-CM

## 2024-12-30 PROCEDURE — 93798 PHYS/QHP OP CAR RHAB W/ECG: CPT

## 2025-01-03 ENCOUNTER — CLINICAL SUPPORT (OUTPATIENT)
Dept: CARDIAC REHAB | Facility: CLINIC | Age: 65
End: 2025-01-03
Payer: COMMERCIAL

## 2025-01-03 DIAGNOSIS — I21.3 ST ELEVATION MYOCARDIAL INFARCTION (STEMI), UNSPECIFIED ARTERY (HCC): Primary | ICD-10-CM

## 2025-01-03 PROCEDURE — 93798 PHYS/QHP OP CAR RHAB W/ECG: CPT

## 2025-01-06 ENCOUNTER — APPOINTMENT (OUTPATIENT)
Dept: CARDIAC REHAB | Facility: CLINIC | Age: 65
End: 2025-01-06
Payer: COMMERCIAL

## 2025-01-08 ENCOUNTER — CLINICAL SUPPORT (OUTPATIENT)
Dept: CARDIAC REHAB | Facility: CLINIC | Age: 65
End: 2025-01-08
Payer: COMMERCIAL

## 2025-01-08 DIAGNOSIS — I21.3 STEMI (ST ELEVATION MYOCARDIAL INFARCTION) (HCC): ICD-10-CM

## 2025-01-08 DIAGNOSIS — I21.3 ST ELEVATION MYOCARDIAL INFARCTION (STEMI), UNSPECIFIED ARTERY (HCC): Primary | ICD-10-CM

## 2025-01-08 PROCEDURE — 93798 PHYS/QHP OP CAR RHAB W/ECG: CPT

## 2025-01-10 ENCOUNTER — CLINICAL SUPPORT (OUTPATIENT)
Dept: CARDIAC REHAB | Facility: CLINIC | Age: 65
End: 2025-01-10
Payer: COMMERCIAL

## 2025-01-10 DIAGNOSIS — I21.3 ST ELEVATION MYOCARDIAL INFARCTION (STEMI), UNSPECIFIED ARTERY (HCC): Primary | ICD-10-CM

## 2025-01-10 PROCEDURE — 93798 PHYS/QHP OP CAR RHAB W/ECG: CPT

## 2025-01-10 RX ORDER — LISINOPRIL 5 MG/1
5 TABLET ORAL DAILY
Qty: 90 TABLET | Refills: 1 | Status: SHIPPED | OUTPATIENT
Start: 2025-01-10

## 2025-01-13 ENCOUNTER — CLINICAL SUPPORT (OUTPATIENT)
Dept: CARDIAC REHAB | Facility: CLINIC | Age: 65
End: 2025-01-13
Payer: COMMERCIAL

## 2025-01-13 DIAGNOSIS — I21.3 ST ELEVATION MYOCARDIAL INFARCTION (STEMI), UNSPECIFIED ARTERY (HCC): Primary | ICD-10-CM

## 2025-01-13 PROCEDURE — 93798 PHYS/QHP OP CAR RHAB W/ECG: CPT

## 2025-01-15 ENCOUNTER — CLINICAL SUPPORT (OUTPATIENT)
Dept: CARDIAC REHAB | Facility: CLINIC | Age: 65
End: 2025-01-15
Payer: COMMERCIAL

## 2025-01-15 DIAGNOSIS — I21.3 ST ELEVATION MYOCARDIAL INFARCTION (STEMI), UNSPECIFIED ARTERY (HCC): Primary | ICD-10-CM

## 2025-01-15 PROCEDURE — 93798 PHYS/QHP OP CAR RHAB W/ECG: CPT

## 2025-01-17 ENCOUNTER — CLINICAL SUPPORT (OUTPATIENT)
Dept: CARDIAC REHAB | Facility: CLINIC | Age: 65
End: 2025-01-17
Payer: COMMERCIAL

## 2025-01-17 DIAGNOSIS — I21.3 ST ELEVATION MYOCARDIAL INFARCTION (STEMI), UNSPECIFIED ARTERY (HCC): Primary | ICD-10-CM

## 2025-01-17 PROCEDURE — 93798 PHYS/QHP OP CAR RHAB W/ECG: CPT

## 2025-01-20 ENCOUNTER — APPOINTMENT (OUTPATIENT)
Dept: CARDIAC REHAB | Facility: CLINIC | Age: 65
End: 2025-01-20
Payer: COMMERCIAL

## 2025-01-22 ENCOUNTER — CLINICAL SUPPORT (OUTPATIENT)
Dept: CARDIAC REHAB | Facility: CLINIC | Age: 65
End: 2025-01-22
Payer: COMMERCIAL

## 2025-01-22 DIAGNOSIS — I21.3 ST ELEVATION MYOCARDIAL INFARCTION (STEMI), UNSPECIFIED ARTERY (HCC): Primary | ICD-10-CM

## 2025-01-22 PROCEDURE — 93798 PHYS/QHP OP CAR RHAB W/ECG: CPT

## 2025-01-22 NOTE — PROGRESS NOTES
CARDIAC REHABILITATION   ASSESSMENT AND INDIVIDUALIZED TREATMENT PLAN  90 DAY      Today's date: 2025   # of Exercise Sessions Completed: 31  Patient name: Richardson Paez      : 1960  Age: 64 y.o.       MRN: [id number]  Referring Physician: Oracio Sy DO  Cardiologist: Edison Gibbons MD   Provider: [place]  Clinician: Sonya Shaffer MS, CEP      Treatment is tailored to this patient's individual needs.  The ITP was reviewed with the patient and all questions were answered to their satisfaction.  Additional ITP documentation can be found electronically including daily and monthly exercise summaries, daily session notes with ECG summaries, education notes, daily medication reconciliation, and daily physician supervision.      Comments:    Richard continues to do well in cardiac rehab, attending 3x/week. He has 5 more sessions remaining until discharge. He tolerates 40-50 mins of exercise at 4.8-6.2 METs plus weight training. He continues to be limited by muscle and joint pain. Richard's muscle pain has not changed, even with reduced statin dose from 80mg to 40mg and he plans to discuss with Dr. Gibbons at his appointment on . Richard has normal resting HR and BP with normal hemodynamic response to exercise, with peak HR reaching 130bpm during exercise. NSR on telemetry with occ PVCs.  Richard was walking 2x/week for about 2-3 miles but has not been recently with the extreme cold temperatures. He states that his insurance pays for a gym membership and plans to look into joining a gym prior to discharge. He is down 4lbs since his initial evaluation. No concern of depression or anxiety at this time and reports good emotional/social support.       Dx:   Encounter Diagnosis   Name Primary?    ST elevation myocardial infarction (STEMI), unspecified artery (HCC) Yes       Description of Diagnosis: STEMI: showed 99% ramus occlusion, status post PCI with SUKHI x 1, OM1 50%, moderate residual disease in the LCx and  RCA   Date of onset: 10/12/24  Other Cardiac History: STEMI and BMS to the Lcx on 04/12/12 , HFmEF (48% EF)        ASSESSMENT    Medical History:   No past medical history on file.    Family History:  Family History   Problem Relation Age of Onset    Heart disease Mother     Heart disease Father        Allergies:   Penicillins    Current Medications:   Current Outpatient Medications   Medication Sig Dispense Refill    aspirin (ECOTRIN LOW STRENGTH) 81 mg EC tablet Take 1 tablet (81 mg total) by mouth daily 90 tablet 3    atorvastatin (LIPITOR) 40 mg tablet Take 1 tablet (40 mg total) by mouth daily 90 tablet 3    ezetimibe (ZETIA) 10 mg tablet Take 1 tablet (10 mg total) by mouth daily 90 tablet 3    lisinopril (ZESTRIL) 5 mg tablet Take 1 tablet (5 mg total) by mouth daily 90 tablet 1    metoprolol succinate (TOPROL-XL) 25 mg 24 hr tablet Take 0.5 tablets (12.5 mg total) by mouth daily 90 tablet 1    ticagrelor (Brilinta) 90 MG Take 1 tablet (90 mg total) by mouth every 12 (twelve) hours 180 tablet 1     No current facility-administered medications for this visit.       Medication compliance: Yes   Comments: Pt reports to be compliant with medications    Physical Limitations: R ankle arthritis, occ L shoulder bursitis    Fall Risk: Low   Comments: Ambulates with a steady gait with no assist device and Denies a fall in the past 6 months    Cultural needs: none      CAD Risk Factors:  Cholesterol: Yes  HTN: Yes  DM: Pre-diabetes  Obesity: No   Inactivity: Yes      EXERCISE ASSESSMENT:     Initial Fitness Assessment: Submaximal TM ETT:  Resting:  BP: 116/76  HR: 86 bpm, Exercise:  BP: 160/76  HR: 121 bpm, METs:  7.5, ECG Summary: NSR, rare PVC, Symptoms: None, and Test terminated at:  RPE 6      ECG INTERPRETATION:  NSR, occ PVCs    Current Functional Status:  Occupation: retired -   ADL’s:resumed all ADLs  [place]: resumed all ADLs able to perform self-care resumed driving  Home exercise: none    SMART  "Exercise Goals:   10% improvement in functional capacity based on max METs achieved in initial fitness assessment  increased exercise capacity by 40% based on peak METs tolerated in cardiac rehab exercise session  maintain > 150 minutes per week of moderate intensity exercise    Patient Specific EXERCISE GOALS:       Start exercise plan at home  Join [employer] with wife to continue exercise    Functional Capacity Screening Tool:  [facility] Activity Status Index:  9.89 METs      PSYCHOSOCIAL ASSESSMENT:    Date of last Assessment:  10/31/24  Depression screening:  PHQ-9 = 2    Interpretation:  1-4 = Minimal Depression  Anxiety screening:  MELINA-7 = 2    Interpretation: 0-4  = Not anxious    Pt self-report of depression and anxiety   Patient reports they are coping well with good social support and denies depression or anxiety    Self-reported stress level:  1    SMART Psychosocial Goals:     Physical Fitness in [facility] Score < 3, Pain in [place] Score < 3, Change in Health in [place] Score < 3 , feel less tired with more energy, and reduced feelings of restlessness    Patient Specific PSYCHOCOSOCIAL GOALS:    Improved thoughts of well being      Quality of Life Screen:  (Higher score indicates disease impact on QOL)  [facility] COOP score: 22/45     Social Support:   spouse, children, and friends     Psychosocial Assessment as it relates to rehabilitation:   Patient denies issues with his/her family or home life that may affect their rehabilitation efforts.       NUTRITION ASSESSMENT:    Initial Weight:  179.8 lb   Current Weight: 175.2 lb    Height:   Ht Readings from Last 1 Encounters:   10/29/24 6' 3\" (1.905 m)       Rate Your Plate Score: 64/81    Diabetes: Pre-diabetes  A1c: 5.7%    last measured: 10/13/24    Lipid management: Last lipid profile 10/13/24  Chol 180    HDL 32  LDL 81    Current Dietary Habits:  Pt changed diet habits with MI in 2012 and has continued with that    SMART Nutrition Goals:   HDL " >40, TRG <150, and Improved Rate Your Plate score  >64    Patient Specific NUTRITION GOALS:     1. Continue with heart healthy diet   2. Check updated lipid panel in 3 months (elevated triglycerides)      OTHER CORE COMPONENT ASSESSMENT:    Tobacco Use:     N/A: Pt has a remote history of smoking    Anginal Symptoms:  chest pressure and excessive diaphoresis   NTG use: No prescription    SMART Goals:   consistent, controlled resting BP < 130/80 and medication compliance    Patient Specific CORE COMPONENT GOALS:    No additional cardiac issues for 10+ years      INDIVIDUALIZED TREATMENT PLAN      EXERCISE GOALS and PLAN      Progress toward Exercise goals:   Pt is progressing and showing improvement  toward the following goals:  attends cardiac rehab regularly 3x/week and only has 5 more sessions remaining until discharge, increased exercise duration to 50 mins; Pt was walking dog daily for 2-3 miles but has not the last few days due to the extreme cold temperatures (unsafe).      Exercise Intervention/plan:    home exercise 30+ mins 2 days opposite CR and reports that his insurance covers gym memberships- he plans to search for one to join in preparation for discharge    PHYSICIAN PRESCRIBED EXERCISE:    Current Aerobic Exercise Prescription:      Frequency: 3 days/week    Supplement with home exercise 2+ days/wk as tolerated     Minutes: 40-50         METS: 4.8-6.2           HR:  110-130 bpm   RPE: 4-6         Modalities: Treadmill, UBE, and Lifecycle     Exercise workloads will be progressed gradually as tolerated, within limits of patient's ability, and according to the patient's response to the exercise program.      Aerobic Exercise Prescription Plan for Progression   Frequency: 3 days/week of cardiac rehab       Supplement with home exercise 2+ days/wk as tolerated    Minutes: 40-50       >150 mins/wk of moderate intensity exercise   METS: 5-7   HR: RHR +30-40bpm     RPE: 4-6   Modalities: Treadmill, UBE,  Lifecycle, Elliptical, and Rower    Strength trainin-3 days / week  12-15 repetitions  1-2 sets per modality    Modalities: Leg Press, Chest Press, Arm Extension, Arm Curl, Upright Rows, and Calf Raises    Home Exercise: Type: walking, Frequency: 2 days/week, Distance 2-3 miles; has not walked the last few days due to extreme cold temperatures (unsafe)    Exercise Education: benefit of exercise for CAD risk factors, AHA guidelines to achieve >150 mins/wk of moderate exercise, RPE scale, and Group class: Risk Factors for Heart Disease     Readiness to change: Action:  (Changing behavior)      NUTRITION GOALS AND PLAN    Patient's progress toward Nutrition goals:    Pt is progressing and showing improvement  toward the following goals:  down 4 lbs since initial evaluation.        Nutrition Intervention/plan:   Continue with heart healthy diet choices; repeat RYP score at discharge    Measurable goals were based Rate Your Plate Dietary Self-Assessment. These are the areas in which the patient could score higher on the assessment.  Goals include recommendations for a heart healthy diet based on American Heart Association.    Nutrition Education:   heart healthy eating principles  nutrition for  lipid management  group class: Heart Healthy Eating  group class:  Label Reading    Readiness to change: Maintenance: (Maintaining the behavior change)      PSYCHOSOCIAL GOALS AND PLAN    Psychosocial Assessment as it relates to rehabilitation:   Patient denies issues with his/her family or home life that may affect their rehabilitation efforts.     Patient's progress toward Psychosocial goals:    Pt is progressing and showing improvement  toward the following goals:  no concerns of depression or anxiety at this time.      Psychosocial Intervention/plan:   Exercise and Keep a positive mindset    Psychosocial Education: benefits of a positive support system, depression and CAD, and class:  Stress and Your Health     Readiness  to change: Maintenance: (Maintaining the behavior change)      OTHER CORE COMPONENTS GOALS and PLAN    Tobacco Intervention/plan:   N/A:  Pt is a non-smoker    Progress toward Core Component goals:   Pt is progressing and showing improvement  toward the following goals:  normal resting BP with appropriate response to exercise; 100% medication compliance.  , Will continue to educate and progress as tolerated.    Other Core Components Intervention:   medication compliance, engage in regular exercise, and monitor home BP    Group and Individual Education:  group class: Understanding Heart Disease, group class:  Common Heart Medications, and benefit of moderate intensity exercise for the heart and benefit of cardiac rehab    Readiness to change: Maintenance: (Maintaining the behavior change)

## 2025-01-24 ENCOUNTER — CLINICAL SUPPORT (OUTPATIENT)
Dept: CARDIAC REHAB | Facility: CLINIC | Age: 65
End: 2025-01-24
Payer: COMMERCIAL

## 2025-01-24 DIAGNOSIS — I21.3 ST ELEVATION MYOCARDIAL INFARCTION (STEMI), UNSPECIFIED ARTERY (HCC): Primary | ICD-10-CM

## 2025-01-24 PROCEDURE — 93798 PHYS/QHP OP CAR RHAB W/ECG: CPT

## 2025-01-26 DIAGNOSIS — E78.00 PURE HYPERCHOLESTEROLEMIA: ICD-10-CM

## 2025-01-26 DIAGNOSIS — I25.10 CORONARY ARTERY DISEASE INVOLVING NATIVE CORONARY ARTERY OF NATIVE HEART WITHOUT ANGINA PECTORIS: ICD-10-CM

## 2025-01-27 ENCOUNTER — RESULTS FOLLOW-UP (OUTPATIENT)
Dept: NON INVASIVE DIAGNOSTICS | Facility: HOSPITAL | Age: 65
End: 2025-01-27

## 2025-01-27 ENCOUNTER — APPOINTMENT (OUTPATIENT)
Dept: LAB | Facility: CLINIC | Age: 65
End: 2025-01-27
Payer: COMMERCIAL

## 2025-01-27 ENCOUNTER — CLINICAL SUPPORT (OUTPATIENT)
Dept: CARDIAC REHAB | Facility: CLINIC | Age: 65
End: 2025-01-27
Payer: COMMERCIAL

## 2025-01-27 ENCOUNTER — TELEPHONE (OUTPATIENT)
Dept: CARDIOLOGY CLINIC | Facility: CLINIC | Age: 65
End: 2025-01-27

## 2025-01-27 DIAGNOSIS — E53.8 B12 DEFICIENCY: Primary | ICD-10-CM

## 2025-01-27 DIAGNOSIS — I10 ESSENTIAL HYPERTENSION: ICD-10-CM

## 2025-01-27 DIAGNOSIS — I21.3 ST ELEVATION MYOCARDIAL INFARCTION (STEMI), UNSPECIFIED ARTERY (HCC): Primary | ICD-10-CM

## 2025-01-27 DIAGNOSIS — D64.9 NORMOCYTIC ANEMIA: ICD-10-CM

## 2025-01-27 DIAGNOSIS — R73.09 ELEVATED HEMOGLOBIN A1C: ICD-10-CM

## 2025-01-27 DIAGNOSIS — Z12.5 SCREENING FOR PROSTATE CANCER: ICD-10-CM

## 2025-01-27 DIAGNOSIS — E53.8 FOLATE DEFICIENCY: ICD-10-CM

## 2025-01-27 DIAGNOSIS — E78.00 PURE HYPERCHOLESTEROLEMIA: ICD-10-CM

## 2025-01-27 LAB
ALBUMIN SERPL BCG-MCNC: 4.4 G/DL (ref 3.5–5)
ALP SERPL-CCNC: 96 U/L (ref 34–104)
ALT SERPL W P-5'-P-CCNC: 19 U/L (ref 7–52)
ANION GAP SERPL CALCULATED.3IONS-SCNC: 8 MMOL/L (ref 4–13)
AST SERPL W P-5'-P-CCNC: 18 U/L (ref 13–39)
BASOPHILS # BLD AUTO: 0.05 THOUSANDS/ΜL (ref 0–0.1)
BASOPHILS NFR BLD AUTO: 1 % (ref 0–1)
BILIRUB SERPL-MCNC: 0.27 MG/DL (ref 0.2–1)
BUN SERPL-MCNC: 13 MG/DL (ref 5–25)
CALCIUM SERPL-MCNC: 9.7 MG/DL (ref 8.4–10.2)
CHLORIDE SERPL-SCNC: 105 MMOL/L (ref 96–108)
CHOLEST SERPL-MCNC: 84 MG/DL (ref ?–200)
CO2 SERPL-SCNC: 27 MMOL/L (ref 21–32)
CREAT SERPL-MCNC: 0.78 MG/DL (ref 0.6–1.3)
EOSINOPHIL # BLD AUTO: 0.07 THOUSAND/ΜL (ref 0–0.61)
EOSINOPHIL NFR BLD AUTO: 1 % (ref 0–6)
ERYTHROCYTE [DISTWIDTH] IN BLOOD BY AUTOMATED COUNT: 13.9 % (ref 11.6–15.1)
EST. AVERAGE GLUCOSE BLD GHB EST-MCNC: 103 MG/DL
GFR SERPL CREATININE-BSD FRML MDRD: 94 ML/MIN/1.73SQ M
GLUCOSE P FAST SERPL-MCNC: 103 MG/DL (ref 65–99)
HBA1C MFR BLD: 5.2 %
HCT VFR BLD AUTO: 33 % (ref 36.5–49.3)
HDLC SERPL-MCNC: 39 MG/DL
HGB BLD-MCNC: 9.7 G/DL (ref 12–17)
IMM GRANULOCYTES # BLD AUTO: 0.01 THOUSAND/UL (ref 0–0.2)
IMM GRANULOCYTES NFR BLD AUTO: 0 % (ref 0–2)
LDLC SERPL CALC-MCNC: 30 MG/DL (ref 0–100)
LYMPHOCYTES # BLD AUTO: 1.6 THOUSANDS/ΜL (ref 0.6–4.47)
LYMPHOCYTES NFR BLD AUTO: 29 % (ref 14–44)
MCH RBC QN AUTO: 25.5 PG (ref 26.8–34.3)
MCHC RBC AUTO-ENTMCNC: 29.4 G/DL (ref 31.4–37.4)
MCV RBC AUTO: 87 FL (ref 82–98)
MONOCYTES # BLD AUTO: 0.57 THOUSAND/ΜL (ref 0.17–1.22)
MONOCYTES NFR BLD AUTO: 10 % (ref 4–12)
NEUTROPHILS # BLD AUTO: 3.2 THOUSANDS/ΜL (ref 1.85–7.62)
NEUTS SEG NFR BLD AUTO: 59 % (ref 43–75)
NRBC BLD AUTO-RTO: 0 /100 WBCS
PLATELET # BLD AUTO: 372 THOUSANDS/UL (ref 149–390)
PMV BLD AUTO: 10 FL (ref 8.9–12.7)
POTASSIUM SERPL-SCNC: 4.5 MMOL/L (ref 3.5–5.3)
PROT SERPL-MCNC: 6.9 G/DL (ref 6.4–8.4)
PSA SERPL-MCNC: 1.27 NG/ML (ref 0–4)
RBC # BLD AUTO: 3.8 MILLION/UL (ref 3.88–5.62)
SODIUM SERPL-SCNC: 140 MMOL/L (ref 135–147)
TRIGL SERPL-MCNC: 74 MG/DL (ref ?–150)
WBC # BLD AUTO: 5.5 THOUSAND/UL (ref 4.31–10.16)

## 2025-01-27 PROCEDURE — 93798 PHYS/QHP OP CAR RHAB W/ECG: CPT

## 2025-01-27 PROCEDURE — G0103 PSA SCREENING: HCPCS

## 2025-01-27 PROCEDURE — 80053 COMPREHEN METABOLIC PANEL: CPT

## 2025-01-27 PROCEDURE — 36415 COLL VENOUS BLD VENIPUNCTURE: CPT

## 2025-01-27 PROCEDURE — 80061 LIPID PANEL: CPT

## 2025-01-27 PROCEDURE — 85025 COMPLETE CBC W/AUTO DIFF WBC: CPT

## 2025-01-27 PROCEDURE — 83036 HEMOGLOBIN GLYCOSYLATED A1C: CPT

## 2025-01-27 RX ORDER — ATORVASTATIN CALCIUM 40 MG/1
40 TABLET, FILM COATED ORAL DAILY
Qty: 90 TABLET | Refills: 1 | Status: SHIPPED | OUTPATIENT
Start: 2025-01-27

## 2025-01-27 RX ORDER — EZETIMIBE 10 MG/1
10 TABLET ORAL DAILY
Qty: 90 TABLET | Refills: 1 | Status: SHIPPED | OUTPATIENT
Start: 2025-01-27

## 2025-01-27 NOTE — TELEPHONE ENCOUNTER
Called patient fide Braun that cholesterol is good and to continue same meds - Atorvastatin and Zetia.  Patient thanks us for phone call.

## 2025-01-28 ENCOUNTER — NURSE TRIAGE (OUTPATIENT)
Age: 65
End: 2025-01-28

## 2025-01-28 NOTE — TELEPHONE ENCOUNTER
"Spouse called to report the patient is at his wits end in pain and can't sleep. He feels it is related to the Atorvastatin he is taking. The patient wants to take an anti inflammatory medication. Advised against same. The patient reports pain and swelling in his hand and shoulder which sounds gout like to this SN, however the pt denies a hx of gout.     Advised pt would inform the provider of symptoms and call back with further recommendations.          Answer Assessment - Initial Assessment Questions  1. NAME of MEDICINE: \"What medicine(s) are you calling about?\"      Atorvastatin   2. QUESTION: \"What is your question?\" (e.g., double dose of medicine, side effect)      Pt is experiencing extreme joint pain and wants to take anti inflammatory. Reports pain in hand and shoulder. Sounds gout like, but denies hx of same   3. PRESCRIBER: \"Who prescribed the medicine?\" Reason: if prescribed by specialist, call should be referred to that group.      Dr. Holden Gibbons   4. SYMPTOMS: \"Do you have any symptoms?\" If Yes, ask: \"What symptoms are you having?\"  \"How bad are the symptoms (e.g., mild, moderate, severe)      Extreme joint pain and swelling    Protocols used: Medication Question Call-Adult-[place]    "

## 2025-01-29 ENCOUNTER — TELEPHONE (OUTPATIENT)
Dept: INTERNAL MEDICINE CLINIC | Facility: CLINIC | Age: 65
End: 2025-01-29

## 2025-01-29 ENCOUNTER — RESULTS FOLLOW-UP (OUTPATIENT)
Dept: NON INVASIVE DIAGNOSTICS | Facility: HOSPITAL | Age: 65
End: 2025-01-29

## 2025-01-29 ENCOUNTER — TELEPHONE (OUTPATIENT)
Dept: CARDIOLOGY CLINIC | Facility: CLINIC | Age: 65
End: 2025-01-29

## 2025-01-29 ENCOUNTER — APPOINTMENT (OUTPATIENT)
Dept: CARDIAC REHAB | Facility: CLINIC | Age: 65
End: 2025-01-29
Payer: COMMERCIAL

## 2025-01-29 ENCOUNTER — HOSPITAL ENCOUNTER (OUTPATIENT)
Dept: NON INVASIVE DIAGNOSTICS | Facility: HOSPITAL | Age: 65
Discharge: HOME | End: 2025-01-29
Payer: COMMERCIAL

## 2025-01-29 VITALS
SYSTOLIC BLOOD PRESSURE: 112 MMHG | DIASTOLIC BLOOD PRESSURE: 64 MMHG | BODY MASS INDEX: 22.26 KG/M2 | HEIGHT: 75 IN | HEART RATE: 73 BPM | WEIGHT: 179 LBS

## 2025-01-29 DIAGNOSIS — I50.22 HEART FAILURE WITH MID-RANGE EJECTION FRACTION (HFMEF) (HCC): ICD-10-CM

## 2025-01-29 LAB
AORTIC ROOT: 3 CM
AORTIC VALVE MEAN VELOCITY: 9.9 M/S
ASCENDING AORTA: 3.4 CM
AV LVOT MEAN GRADIENT: 3 MMHG
AV LVOT PEAK GRADIENT: 6 MMHG
AV MEAN PRESS GRAD SYS DOP V1V2: 5 MMHG
AV PEAK GRADIENT: 9 MMHG
AV VELOCITY RATIO: 0.8
AV VMAX SYS DOP: 1.5 M/S
BSA FOR ECHO PROCEDURE: 2.09 M2
DOP CALC AO VTI: 31.8 CM
DOP CALC LVOT PEAK VEL VTI: 25.52 CM
DOP CALC LVOT PEAK VEL: 1.27 M/S
E WAVE DECELERATION TIME: 220 MS
E/A RATIO: 0.91
FRACTIONAL SHORTENING: 27 (ref 28–44)
INTERVENTRICULAR SEPTUM IN DIASTOLE (PARASTERNAL SHORT AXIS VIEW): 1.1 CM
INTERVENTRICULAR SEPTUM: 1.1 CM (ref 0.6–1.1)
LAAS-AP2: 17.9 CM2
LAAS-AP4: 21.8 CM2
LEFT ATRIUM SIZE: 4.1 CM
LEFT ATRIUM VOLUME (MOD BIPLANE): 66 ML
LEFT ATRIUM VOLUME INDEX (MOD BIPLANE): 31.6 ML/M2
LEFT INTERNAL DIMENSION IN SYSTOLE: 3.8 CM (ref 2.1–4)
LEFT VENTRICLE DIASTOLIC VOLUME (MOD BIPLANE): 120 ML
LEFT VENTRICLE DIASTOLIC VOLUME INDEX (MOD BIPLANE): 57.4 ML/M2
LEFT VENTRICLE SYSTOLIC VOLUME (MOD BIPLANE): 51 ML
LEFT VENTRICLE SYSTOLIC VOLUME INDEX (MOD BIPLANE): 24.4 ML/M2
LEFT VENTRICULAR INTERNAL DIMENSION IN DIASTOLE: 5.2 CM (ref 3.5–6)
LEFT VENTRICULAR POSTERIOR WALL IN END DIASTOLE: 1 CM
LEFT VENTRICULAR STROKE VOLUME: 70 ML
LV EF BIPLANE MOD: 58 %
LV EF US.2D.A4C+ESTIMATED: 54 %
LVSV (TEICH): 70 ML
MV E'TISSUE VEL-LAT: 17 CM/S
MV E'TISSUE VEL-SEP: 9 CM/S
MV PEAK A VEL: 0.76 M/S
MV PEAK E VEL: 69 CM/S
MV STENOSIS PRESSURE HALF TIME: 64 MS
MV VALVE AREA P 1/2 METHOD: 3.44
RA PRESSURE ESTIMATED: 5 MMHG
RIGHT ATRIAL 2D VOLUME: 59 ML
RIGHT ATRIUM AREA SYSTOLE A4C: 19.3 CM2
RIGHT VENTRICLE ID DIMENSION: 3.9 CM
RV PSP: 30 MMHG
SL CV LEFT ATRIUM LENGTH A2C: 4.8 CM
SL CV LV EF: 60
SL CV PED ECHO LEFT VENTRICLE DIASTOLIC VOLUME (MOD BIPLANE) 2D: 131 ML
SL CV PED ECHO LEFT VENTRICLE SYSTOLIC VOLUME (MOD BIPLANE) 2D: 61 ML
TR MAX PG: 25 MMHG
TR PEAK VELOCITY: 2.5 M/S
TRICUSPID ANNULAR PLANE SYSTOLIC EXCURSION: 2.5 CM
TRICUSPID VALVE PEAK REGURGITATION VELOCITY: 2.5 M/S

## 2025-01-29 PROCEDURE — 93306 TTE W/DOPPLER COMPLETE: CPT

## 2025-01-29 PROCEDURE — 93306 TTE W/DOPPLER COMPLETE: CPT | Performed by: STUDENT IN AN ORGANIZED HEALTH CARE EDUCATION/TRAINING PROGRAM

## 2025-01-29 NOTE — TELEPHONE ENCOUNTER
A.  Relayed results to (patient/patient representative as listed on communication consent form) as per provider message. Patient/Patient Representative expressed understanding and had the following question(s): Patient returned call, states he has not noticed any blood in stool or urine and has not donated any blood recently . Patient states that he is on a blood thinner and was advised that this may cause the anemia and that he is going to see Cardiology tomorrow and will follow up with them in regards to this as well and then will have labs done and follow up . Please advise.     B. Route to OFFICE CLINICAL POOL for follow-up with provider.

## 2025-01-29 NOTE — TELEPHONE ENCOUNTER
----- Message from Faisal Hernandez DO sent at 1/29/2025 12:46 PM EST -----  Labs overall stable.  There is evidence of anemia with a drop in hemoglobin down to 9.7. Can you please ask Richardson if he has noticed any blood in his stool or urine or if he has donated blood recently?    I recommend repeating blood work to confirm the anemia and check some iron studies and related labs

## 2025-01-29 NOTE — TELEPHONE ENCOUNTER
PC to Richardson with Aparna's echo results for him. Heart function is improved since when he had his heart attack, which is good news.  I reminded him of his appointment with Dr Gibbons coming up on 1/30/25.  He was aware of this news, for he read it in his My Chart.  He thanks us for the call.

## 2025-01-30 ENCOUNTER — OFFICE VISIT (OUTPATIENT)
Dept: CARDIOLOGY CLINIC | Facility: CLINIC | Age: 65
End: 2025-01-30
Payer: COMMERCIAL

## 2025-01-30 ENCOUNTER — TELEPHONE (OUTPATIENT)
Age: 65
End: 2025-01-30

## 2025-01-30 VITALS
BODY MASS INDEX: 21.36 KG/M2 | DIASTOLIC BLOOD PRESSURE: 81 MMHG | HEIGHT: 75 IN | WEIGHT: 171.8 LBS | HEART RATE: 71 BPM | SYSTOLIC BLOOD PRESSURE: 132 MMHG

## 2025-01-30 DIAGNOSIS — D64.89 ANEMIA DUE TO OTHER CAUSE, NOT CLASSIFIED: ICD-10-CM

## 2025-01-30 DIAGNOSIS — E78.00 PURE HYPERCHOLESTEROLEMIA: ICD-10-CM

## 2025-01-30 DIAGNOSIS — I25.10 CORONARY ARTERY DISEASE INVOLVING NATIVE CORONARY ARTERY OF NATIVE HEART WITHOUT ANGINA PECTORIS: Primary | ICD-10-CM

## 2025-01-30 DIAGNOSIS — I10 ESSENTIAL HYPERTENSION: ICD-10-CM

## 2025-01-30 PROCEDURE — 99214 OFFICE O/P EST MOD 30 MIN: CPT | Performed by: INTERNAL MEDICINE

## 2025-01-30 RX ORDER — PRAVASTATIN SODIUM 40 MG
40 TABLET ORAL DAILY
Qty: 30 TABLET | Refills: 0 | Status: SHIPPED | OUTPATIENT
Start: 2025-01-30

## 2025-01-30 NOTE — TELEPHONE ENCOUNTER
Patient called and stated he had seen his cardiologist today and they had reviewed his CBC results. Patients cardiologist stated that he feels his CBC results were altered due to certain medications he is on. Patients cardiologist suggested patient repeat the CBC in a couple of days and take iron supplements. Patient was advised by his cardiologist to contact his pcp office to have CBC order placed. If order can be placed please return patient call to notify.

## 2025-01-30 NOTE — TELEPHONE ENCOUNTER
Called spoke with patient gave results and recommendations and advised repeat labs and order have been placed in chart

## 2025-01-30 NOTE — PROGRESS NOTES
Cardiology Follow Up    Richardson Paez  1960  [id number]  [facility] CARDIAC CATH LAB  [address] [address]  [phone]    1. Coronary artery disease involving native coronary artery of native heart without angina pectoris        2. Pure hypercholesterolemia  Hepatic function panel    Lipid panel    pravastatin (PRAVACHOL) 40 mg tablet      3. Essential hypertension        4. Anemia due to other cause, not classified            Interval History: Complains of significant myalgias from atorvastatin.  Denies any chest pain shortness of breath orthopnea paroxysmal nocturnal dyspnea or syncope.    Patient Active Problem List   Diagnosis    Coronary artery disease involving native coronary artery of native heart without angina pectoris    Pure hypercholesterolemia    Essential hypertension    Esophageal reflux    Allergic rhinitis    History of colon polyps    Arthritis of right ankle    STEMI (ST elevation myocardial infarction) ([facility])    Tobacco abuse    Heart failure with mid-range ejection fraction (HFmEF) ([facility])    Other specified anemias     No past medical history on file.  Social History     Socioeconomic History    Marital status: /Civil Union     Spouse name: Not on file    Number of children: Not on file    Years of education: Not on file    Highest education level: Not on file   Occupational History    Not on file   Tobacco Use    Smoking status: Former     Current packs/day: 0.00     Types: Cigarettes     Quit date:      Years since quittin.0    Smokeless tobacco: Never   Vaping Use    Vaping status: Never Used   Substance and Sexual Activity    Alcohol use: Yes     Comment: social    Drug use: Not on file    Sexual activity: Not on file   Other Topics Concern    Not on file   Social History Narrative    Not on file     Social Drivers of Health     Financial Resource Strain: Not on file   Food  Insecurity: No Food Insecurity (10/14/2024)    Nursing - Inadequate Food Risk Classification     Worried About Running Out of Food in the Last Year: Never true     Ran Out of Food in the Last Year: Never true     Ran Out of Food in the Last Year: Not on file   Transportation Needs: No Transportation Needs (10/14/2024)    PRAPARE - Transportation     Lack of Transportation (Medical): No     Lack of Transportation (Non-Medical): No   Physical Activity: Not on file   Stress: Not on file   Social Connections: Not on file   Intimate Partner Violence: Not on file   Housing Stability: Unknown (10/14/2024)    Housing Stability Vital Sign     Unable to Pay for Housing in the Last Year: Not on file     Number of Times Moved in the Last Year: 0     Homeless in the Last Year: No      Family History   Problem Relation Age of Onset    Heart disease Mother     Heart disease Father      Past Surgical History:   Procedure Laterality Date    APPENDECTOMY      CARDIAC CATHETERIZATION N/A 10/12/2024    Procedure: Cardiac pci;  Surgeon: Rayne Miller DO;  Location: [facility] CARDIAC CATH LAB;  Service: Cardiology    CARDIAC CATHETERIZATION Left 10/12/2024    Procedure: Cardiac Left Heart Cath;  Surgeon: Rayne Miller DO;  Location: [facility] CARDIAC CATH LAB;  Service: Cardiology    CORONARY ANGIOPLASTY WITH STENT PLACEMENT      HERNIA REPAIR         Current Outpatient Medications:     aspirin (ECOTRIN LOW STRENGTH) 81 mg EC tablet, Take 1 tablet (81 mg total) by mouth daily, Disp: 90 tablet, Rfl: 3    atorvastatin (LIPITOR) 40 mg tablet, Take 1 tablet (40 mg total) by mouth daily, Disp: 90 tablet, Rfl: 1    ezetimibe (ZETIA) 10 mg tablet, Take 1 tablet (10 mg total) by mouth daily, Disp: 90 tablet, Rfl: 1    lisinopril (ZESTRIL) 5 mg tablet, Take 1 tablet (5 mg total) by mouth daily, Disp: 90 tablet, Rfl: 1    pravastatin (PRAVACHOL) 40 mg tablet, Take 1 tablet (40 mg total) by mouth daily, Disp: 30 tablet, Rfl: 0    ticagrelor (Brilinta)  90 MG, Take 1 tablet (90 mg total) by mouth every 12 (twelve) hours, Disp: 180 tablet, Rfl: 1    metoprolol succinate (TOPROL-XL) 25 mg 24 hr tablet, Take 0.5 tablets (12.5 mg total) by mouth daily (Patient not taking: Reported on 1/30/2025), Disp: 90 tablet, Rfl: 1  Allergies   Allergen Reactions    Penicillins Rash       Labs:  Hospital Outpatient Visit on 01/29/2025   Component Date Value    BSA 01/29/2025 2.09     A4C EF 01/29/2025 54     LV Diastolic Volume (BP) 01/29/2025 120     LV Systolic Volume (BP) 01/29/2025 51     EF 01/29/2025 58     LVIDd 01/29/2025 5.20     LVIDS 01/29/2025 3.80     IVSd 01/29/2025 1.10     LVPWd 01/29/2025 1.00     LVOT peak VTI 01/29/2025 25.52     FS 01/29/2025 27     MV E' Tissue Velocity Se* 01/29/2025 9     MV E' Tissue Velocity La* 01/29/2025 17     LA Volume Index (BP) 01/29/2025 31.6     E/A ratio 01/29/2025 0.91     E wave deceleration time 01/29/2025 220     MV Peak E Josesito 01/29/2025 69     MV Peak A Josesito 01/29/2025 0.76     AV LVOT peak gradient 01/29/2025 6     LVOT peak josesito 01/29/2025 1.27     RVID d 01/29/2025 3.9     Tricuspid annular plane * 01/29/2025 2.50     LA size 01/29/2025 4.1     LA length (A2C) 01/29/2025 4.80     LA volume (BP) 01/29/2025 66     RA 2D Volume 01/29/2025 59.0     RAA A4C 01/29/2025 19.3     Aortic valve peak veloci* 01/29/2025 1.5     Ao VTI 01/29/2025 31.8     AV mean gradient 01/29/2025 5     LVOT mn grad 01/29/2025 3.0     AV peak gradient 01/29/2025 9     MV stenosis pressure 1/2* 01/29/2025 64     MV valve area p 1/2 meth* 01/29/2025 3.44     TR Peak Josesito 01/29/2025 2.5     Triscuspid Valve Regurgi* 01/29/2025 25.0     Ao root 01/29/2025 3.00     Asc Ao 01/29/2025 3.4     Aortic valve mean veloci* 01/29/2025 9.90     Tricuspid valve peak reg* 01/29/2025 2.50     Left ventricular stroke * 01/29/2025 70.00     IVS 01/29/2025 1.1     LEFT VENTRICLE SYSTOLIC * 01/29/2025 61     LV DIASTOLIC VOLUME (MOD* 01/29/2025 131     Left Atrium  Area-systoli* 01/29/2025 21.8     Left Atrium Area-systoli* 01/29/2025 17.9     LVSV, 2D 01/29/2025 70     DVI 01/29/2025 0.80     LV Diastolic Volume Inde* 01/29/2025 57.4     LV Systolic Volume Index* 01/29/2025 24.4     LV EF 01/29/2025 60     Est. RA pres 01/29/2025 5.0     Right Ventricular Peak S* 01/29/2025 30.00    Appointment on 01/27/2025   Component Date Value    Hemoglobin A1C 01/27/2025 5.2     EAG 01/27/2025 103     Cholesterol 01/27/2025 84     Triglycerides 01/27/2025 74     HDL, Direct 01/27/2025 39 (L)     LDL Calculated 01/27/2025 30     WBC 01/27/2025 5.50     RBC 01/27/2025 3.80 (L)     Hemoglobin 01/27/2025 9.7 (L)     Hematocrit 01/27/2025 33.0 (L)     MCV 01/27/2025 87     MCH 01/27/2025 25.5 (L)     MCHC 01/27/2025 29.4 (L)     RDW 01/27/2025 13.9     MPV 01/27/2025 10.0     Platelets 01/27/2025 372     nRBC 01/27/2025 0     Segmented % 01/27/2025 59     Immature Grans % 01/27/2025 0     Lymphocytes % 01/27/2025 29     Monocytes % 01/27/2025 10     Eosinophils Relative 01/27/2025 1     Basophils Relative 01/27/2025 1     Absolute Neutrophils 01/27/2025 3.20     Absolute Immature Grans 01/27/2025 0.01     Absolute Lymphocytes 01/27/2025 1.60     Absolute Monocytes 01/27/2025 0.57     Eosinophils Absolute 01/27/2025 0.07     Basophils Absolute 01/27/2025 0.05     Sodium 01/27/2025 140     Potassium 01/27/2025 4.5     Chloride 01/27/2025 105     CO2 01/27/2025 27     ANION GAP 01/27/2025 8     BUN 01/27/2025 13     Creatinine 01/27/2025 0.78     Glucose, Fasting 01/27/2025 103 (H)     Calcium 01/27/2025 9.7     AST 01/27/2025 18     ALT 01/27/2025 19     Alkaline Phosphatase 01/27/2025 96     Total Protein 01/27/2025 6.9     Albumin 01/27/2025 4.4     Total Bilirubin 01/27/2025 0.27     eGFR 01/27/2025 94     PSA 01/27/2025 1.267      Imaging: Echo complete w/ contrast if indicated  Result Date: 1/29/2025  Narrative:   Left Ventricle: Left ventricular cavity size is normal. Wall thickness is  mildly increased. There is concentric remodeling. The left ventricular ejection fraction is 60%. Systolic function is normal. Diastolic function is normal.   The following segments are hypokinetic: basal inferolateral and mid inferolateral.   All other segments are normal.   Right Ventricle: Right ventricular cavity size is normal. Systolic function is normal.   Mitral Valve: There is mild to moderate regurgitation.   Prior TTE study available for comparison. Prior study date: 10/13/2024. Changes noted when compared to prior study. Changes include: Left ventricular systolic function is improved.  Right ventricle is no longer dilated.  Tricuspid regurgitation is also improved.       Review of Systems:  Review of Systems   Constitutional:  Negative for chills and fever.   HENT:  Negative for ear pain and sore throat.    Eyes:  Negative for pain and visual disturbance.   Respiratory:  Negative for cough and shortness of breath.    Cardiovascular:  Negative for chest pain and palpitations.   Gastrointestinal:  Negative for abdominal pain and vomiting.   Genitourinary:  Negative for dysuria and hematuria.   Musculoskeletal:  Negative for arthralgias and back pain.   Skin:  Negative for color change and rash.   Neurological:  Negative for seizures and syncope.   All other systems reviewed and are negative.      Physical Exam:  Physical Exam  Vitals and nursing note reviewed.   Constitutional:       Appearance: Normal appearance.   HENT:      Head: Normocephalic and atraumatic.      Nose: Nose normal.      Mouth/Throat:      Mouth: Mucous membranes are moist.   Eyes:      Conjunctiva/sclera: Conjunctivae normal.   Cardiovascular:      Rate and Rhythm: Normal rate and regular rhythm.   Pulmonary:      Effort: Pulmonary effort is normal.      Breath sounds: Normal breath sounds.   Abdominal:      Palpations: Abdomen is soft.   Musculoskeletal:         General: Normal range of motion.      Cervical back: Normal range of  motion.   Skin:     General: Skin is warm and dry.   Neurological:      General: No focal deficit present.      Mental Status: He is alert and oriented to person, place, and time.   Psychiatric:         Mood and Affect: Mood normal.         Discussion/Summary: MI in October with stenting of the left ramus coronary artery.  Previously had stenting of the circumflex coronary artery in 2012.  No cardiovascular issues since that time.  Denies chest pain or shortness of breath and is functional class I.  Echocardiogram done recently showed preserved left-ventricular systolic function.  In the setting of her recent acute coronary stent in her room with stent placement he is currently on aspirin and Brilinta 90 mg twice daily.  He was found to be anemic on recent blood work and this is currently being worked up by his primary care physician.  I told him to make sure he continues to follow-up on this.  In the setting of his ACS we will continue with Brilinta.  Blood pressure is consistent controlled on lisinopril.  Has noted significant myalgias on atorvastatin 40 and Zetia 10.  Most recent LDL was 33.  Ideally would like to see his LDL less than 70 and ideally 55.  He does not tolerate the atorvastatin.  I will change him to pravastatin and start with 20 mg daily with titration to 40 mg daily.  If he tolerates it we will check another fasting lipid profile in 3 months.  If he does not tolerate pravastatin then we will consider a P CS K9 inhibitor.  I will see him again in 6 months.

## 2025-01-31 ENCOUNTER — CLINICAL SUPPORT (OUTPATIENT)
Dept: CARDIAC REHAB | Facility: CLINIC | Age: 65
End: 2025-01-31
Payer: COMMERCIAL

## 2025-01-31 DIAGNOSIS — I21.3 ST ELEVATION MYOCARDIAL INFARCTION (STEMI), UNSPECIFIED ARTERY (HCC): Primary | ICD-10-CM

## 2025-01-31 PROCEDURE — 93798 PHYS/QHP OP CAR RHAB W/ECG: CPT

## 2025-02-03 ENCOUNTER — CLINICAL SUPPORT (OUTPATIENT)
Dept: CARDIAC REHAB | Facility: CLINIC | Age: 65
End: 2025-02-03
Payer: COMMERCIAL

## 2025-02-03 DIAGNOSIS — I21.3 ST ELEVATION MYOCARDIAL INFARCTION (STEMI), UNSPECIFIED ARTERY (HCC): Primary | ICD-10-CM

## 2025-02-03 PROCEDURE — 93798 PHYS/QHP OP CAR RHAB W/ECG: CPT

## 2025-02-05 ENCOUNTER — CLINICAL SUPPORT (OUTPATIENT)
Dept: CARDIAC REHAB | Facility: CLINIC | Age: 65
End: 2025-02-05
Payer: COMMERCIAL

## 2025-02-05 DIAGNOSIS — I21.3 ST ELEVATION MYOCARDIAL INFARCTION (STEMI), UNSPECIFIED ARTERY (HCC): Primary | ICD-10-CM

## 2025-02-05 PROCEDURE — 93798 PHYS/QHP OP CAR RHAB W/ECG: CPT

## 2025-02-05 NOTE — PROGRESS NOTES
CARDIAC REHABILITATION   ASSESSMENT AND INDIVIDUALIZED TREATMENT PLAN  DISCHARGE        Today's date: 2025   # of Exercise Sessions Completed: 36  Patient name: iRchardson Paez      : 1960  Age: 64 y.o.       MRN: [id number]  Referring Physician: Oracio Sy DO  Cardiologist: Edison Gibbons MD   Provider: [place]  Clinician: Starr Hampton MS, CEP      Treatment is tailored to this patient's individual needs.  The ITP was reviewed with the patient and all questions were answered to their satisfaction.  Additional ITP documentation can be found electronically including daily and monthly exercise summaries, daily session notes with ECG summaries, education notes, daily medication reconciliation, and daily physician supervision.      Comments:    Discharge note for Richard.  He had 28% improvement in functional capacity increasing His max METs in the submaximal TM ETT  from 7.5 to 9.6 with test termination of RPE 6. His exercise tolerance (max METs in tolerated in cardiac rehab) increased by 14.8% and he reports significantly less muscle/joint pain since switch from atorvastatin to pravastatin. His [facility] QOL improved by 18.2%.  PHQ-9 score decreased from 2 to 1.  MELINA-7 decreased from 2 to 0.  His weight decreased by 7.4 pounds. Rate Your Plate score improved from 64 to 70.  In cardiac rehab, Richard tolerates 40-50 mins of exercise at 4.9 - 6.2 METs plus wt training. NSR with occ PVCs noted on telemetry.  RHR 62-75 bpm, ExHR 105-135 bpm. Resting BP ranges from 100/68 - 144/84. Richard has attended all group educational classes offered on risk factor reduction and healthy eating.   Discharge plans include continued exercise walking. Encouraged Richard to continue a disciplined exercise regimen: Frequency: 4-6 days/wk, Intensity: RPE 4-5, Time: 40-50 mins daily, 150-200 mins/wk. Richard was encouraged to remain compliant with medications and follow up with cardiologist with any cardiac symptoms and medication  management.      Dx:   Encounter Diagnosis   Name Primary?    ST elevation myocardial infarction (STEMI), unspecified artery (HCC) Yes       Description of Diagnosis: STEMI: showed 99% ramus occlusion, status post PCI with SUKHI x 1, OM1 50%, moderate residual disease in the LCx and RCA   Date of onset: 10/12/24  Other Cardiac History: STEMI and BMS to the Lcx on 04/12/12 , HFmEF (48% EF)        ASSESSMENT    Medical History:   No past medical history on file.    Family History:  Family History   Problem Relation Age of Onset    Heart disease Mother     Heart disease Father        Allergies:   Penicillins    Current Medications:   Current Outpatient Medications   Medication Sig Dispense Refill    aspirin (ECOTRIN LOW STRENGTH) 81 mg EC tablet Take 1 tablet (81 mg total) by mouth daily 90 tablet 3    atorvastatin (LIPITOR) 40 mg tablet Take 1 tablet (40 mg total) by mouth daily 90 tablet 1    ezetimibe (ZETIA) 10 mg tablet Take 1 tablet (10 mg total) by mouth daily 90 tablet 1    lisinopril (ZESTRIL) 5 mg tablet Take 1 tablet (5 mg total) by mouth daily 90 tablet 1    metoprolol succinate (TOPROL-XL) 25 mg 24 hr tablet Take 0.5 tablets (12.5 mg total) by mouth daily (Patient not taking: Reported on 1/30/2025) 90 tablet 1    pravastatin (PRAVACHOL) 40 mg tablet Take 1 tablet (40 mg total) by mouth daily 30 tablet 0    ticagrelor (Brilinta) 90 MG Take 1 tablet (90 mg total) by mouth every 12 (twelve) hours 180 tablet 1     No current facility-administered medications for this visit.       Medication compliance: Yes   Comments: Pt reports to be compliant with medications    Physical Limitations: R ankle arthritis, occ L shoulder bursitis    Fall Risk: Low   Comments: Ambulates with a steady gait with no assist device and Denies a fall in the past 6 months    Cultural needs: none      CAD Risk Factors:  Cholesterol: Yes  HTN: Yes  DM: Pre-diabetes  Obesity: No   Inactivity: Yes      EXERCISE ASSESSMENT:    Initial Fitness  Assessment 10/31/24: Submaximal TM ETT:  Resting:  BP: 116/76  HR: 86 bpm, Exercise:  BP: 160/76  HR: 121 bpm, METs:  7.5, ECG Summary: NSR, rare PVC, Symptoms: None, and Test terminated at:  RPE 6  Discharge Fitness Assessment 1/31/25: Submaximal TM ETT:  Resting:  BP: 126/64  HR: 65 bpm, Exercise:  BP: 172/80  HR: 135 bpm, METs:  9.6, ECG Summary: NSR, rare PVC, Symptoms: None, and Test terminated at:  RPE 6      ECG INTERPRETATION:  NSR, occ PVCs    Current Functional Status:  Occupation: retired -   ADL’s:resumed all ADLs  [place]: resumed all ADLs able to perform self-care resumed driving    SMART Exercise Goals:   10% improvement in functional capacity based on max METs achieved in initial fitness assessment  increased exercise capacity by 40% based on peak METs tolerated in cardiac rehab exercise session  maintain > 150 minutes per week of moderate intensity exercise    Patient Specific EXERCISE GOALS:       Start exercise plan at home  Join [employer] with wife to continue exercise    Functional Capacity Screening Tool:  [facility] Activity Status Index:  8.97 METs       PSYCHOSOCIAL ASSESSMENT:    Date of last Assessment:  10/31/24  Depression screening:  PHQ-9 = 1    Interpretation:  1-4 = Minimal Depression  Anxiety screening:  MELINA-7 = 0    Interpretation: 0-4  = Not anxious    Pt self-report of depression and anxiety   Patient reports they are coping well with good social support and denies depression or anxiety    Self-reported stress level:  1    SMART Psychosocial Goals:     Physical Fitness in [facility] Score < 3, Pain in [facility] Score < 3, Change in Health in [facility] Score < 3 , feel less tired with more energy, and reduced feelings of restlessness    Patient Specific PSYCHOCOSOCIAL GOALS:    Improved thoughts of well being      Quality of Life Screen:  (Higher score indicates disease impact on QOL)  [facility] COOP score: 18/45     Social Support:   spouse, children, and  "friends     Psychosocial Assessment as it relates to rehabilitation:   Patient denies issues with his/her family or home life that may affect their rehabilitation efforts.       NUTRITION ASSESSMENT:    Initial Weight:  179.8 lb   Current Weight: 1772.4 lb    Height:   Ht Readings from Last 1 Encounters:   01/30/25 6' 3\" (1.905 m)       Rate Your Plate Score: 70/81    Diabetes: Pre-diabetes  A1c: 5.2%    last measured: 1/27/25    Lipid management: Last lipid profile 1/27/25  Chol 84  TRG 74  HDL 39  LDL 30    Current Dietary Habits:  Pt changed diet habits with MI in 2012 and has continued with that    SMART Nutrition Goals:   HDL >40, TRG <150, and Improved Rate Your Plate score  >64    Patient Specific NUTRITION GOALS:     1. Continue with heart healthy diet   2. Check updated lipid panel      OTHER CORE COMPONENT ASSESSMENT:    Tobacco Use:     N/A: Pt has a remote history of smoking    Anginal Symptoms:  chest pressure and excessive diaphoresis   NTG use: No prescription    SMART Goals:   consistent, controlled resting BP < 130/80 and medication compliance     Patient Specific CORE COMPONENT GOALS:    No additional cardiac issues for 10+ years      INDIVIDUALIZED TREATMENT PLAN      EXERCISE GOALS and PLAN      Progress toward Exercise goals:   Goals met: Join [employer] with wife to continue exercise, 10% improvement in functional capacity based on max METs achieved in initial fitness assessment.    Exercise Intervention/plan:    After discharge patient will continue to follow a regular exercise regimen with the goal of a minimum of 150 minutes per week of moderate intensity exercise.      PHYSICIAN PRESCRIBED EXERCISE:    Current Aerobic Exercise Prescription:      Frequency: 3 days/week    Supplement with home exercise 2+ days/wk as tolerated     Minutes: 40-50         METS: 3.8-6.2          HR:  105-137 bpm   RPE: 4-6         Modalities: Treadmill, UBE, and Lifecycle       Aerobic Exercise Prescription Plan " for Discharge   Frequency: 3-5 days/week    Minutes: 30-50       >150 mins/wk of moderate intensity exercise   METS: 5-7   HR:  119-137 bpm (60-80%HRR)      RPE: 4-6   Modalities: Treadmill, UBE, and Lifecycle    Strength trainin-3 days / week  12-15 repetitions  1-2 sets per modality    Modalities: Leg Press, Chest Press, Arm Extension, Arm Curl, Upright Rows, and Calf Raises    Home Exercise: Type: walking, Frequency: 2 days/week, Distance 2-3 miles    Exercise Education: benefit of exercise for CAD risk factors, home exercise guidelines, AHA guidelines to achieve >150 mins/wk of moderate exercise, RPE scale, Group class: Risk Factors for Heart Disease, and exercise instructions/guidelines for discharge      Readiness to change: Maintenance: (Maintaining the behavior change)      NUTRITION GOALS AND PLAN    Patient's progress toward Nutrition goals:    Goals met: Continue with heart healthy diet, HDL >40, TRG <150, and Improved Rate Your Plate score  >64.      Nutrition Intervention/plan:   After discharge patient will continue to maintain healthy lifestyle habits and focus on risk factor modification.    Nutrition Education:   heart healthy eating principles  nutrition for  lipid management  group class: Heart Healthy Eating  group class:  Label Reading    Readiness to change: Maintenance: (Maintaining the behavior change)      PSYCHOSOCIAL GOALS AND PLAN    Psychosocial Assessment as it relates to rehabilitation:   Patient denies issues with his/her family or home life that may affect their rehabilitation efforts.     Patient's progress toward Psychosocial goals:    Goals met: Improved thoughts of well being, Change in Health in [place] Score < 3, Physical Fitness in [place] Score < 3, and  reduced feelings of restlessness.    Psychosocial Intervention/plan:   Exercise and Keep a positive mindset    Psychosocial Education: benefits of a positive support system, depression and CAD, and class:  Stress and  Your Health     Readiness to change: Maintenance: (Maintaining the behavior change)      OTHER CORE COMPONENTS GOALS and PLAN    Tobacco Intervention/plan:   N/A:  Pt is a non-smoker    Progress toward Core Component goals:   Goals met: consistent, controlled resting BP < 130/80 and medication compliance .    Other Core Components Intervention:   After discharge patient will continue to maintain healthy lifestyle habits and focus on risk factor modification.    Group and Individual Education:  group class: Understanding Heart Disease, group class:  Common Heart Medications, and benefit of moderate intensity exercise for the heart and benefit of cardiac rehab    Readiness to change: Maintenance: (Maintaining the behavior change)

## 2025-02-23 DIAGNOSIS — E78.00 PURE HYPERCHOLESTEROLEMIA: ICD-10-CM

## 2025-02-25 RX ORDER — PRAVASTATIN SODIUM 40 MG
40 TABLET ORAL DAILY
Qty: 90 TABLET | Refills: 1 | Status: SHIPPED | OUTPATIENT
Start: 2025-02-25

## 2025-03-25 ENCOUNTER — TELEPHONE (OUTPATIENT)
Age: 65
End: 2025-03-25

## 2025-03-25 NOTE — TELEPHONE ENCOUNTER
FOLLOW UP: S/p PCI 10/12/24  Pt was switched from atorvastatin to pravastatin 40 mg due to significant myalgias. Pt called today stating the symptoms have returned in b/l LE's. He stopped taking it for four days and symptoms completely resolved. Pt stated he was told there is a third option and is interested in giving it a try. Please advise    DISPOSITION: discuss with provider

## 2025-03-26 NOTE — TELEPHONE ENCOUNTER
Pt is not commercially insured - cannot use a co-pay card. We have 2 bottles of Brilinta @ [facility].    Called pt - he will come to [facility] today and p/u sample until Dr. Gibbons can place orders for switch to Plavix.

## 2025-03-26 NOTE — TELEPHONE ENCOUNTER
Received call from pt, he's following up on call yesterday regarding Pravastatin.    He also said his brilinta increased in price, from $150 to $250.  He cannot afford this.  He needs an alternative prescribed today, he does not have any more brilinta to continue on in the meantime.    Please advise.

## 2025-04-02 DIAGNOSIS — I25.10 CAD (CORONARY ARTERY DISEASE): Primary | ICD-10-CM

## 2025-04-02 RX ORDER — CLOPIDOGREL BISULFATE 75 MG/1
75 TABLET ORAL DAILY
Qty: 90 TABLET | Refills: 3 | Status: SHIPPED | OUTPATIENT
Start: 2025-04-02

## 2025-04-02 NOTE — TELEPHONE ENCOUNTER
Patient called back today regarding the change from Brilinta to Plavix. He has one more day's worth of Brilinta left and will have none by Friday.     Please send script for Plavix to patient's retail pharmacy

## 2025-04-14 ENCOUNTER — RA CDI HCC (OUTPATIENT)
Dept: OTHER | Facility: HOSPITAL | Age: 65
End: 2025-04-14

## 2025-04-14 PROBLEM — I11.0 HYPERTENSIVE HEART DISEASE WITH CONGESTIVE HEART FAILURE (HCC): Status: ACTIVE | Noted: 2025-04-14

## 2025-04-14 PROBLEM — I25.2 OLD NON-ST ELEVATION MYOCARDIAL INFARCTION (NSTEMI): Status: ACTIVE | Noted: 2025-04-14

## 2025-04-14 PROBLEM — I11.0 HYPERTENSIVE HEART DISEASE WITH CONGESTIVE HEART FAILURE (HCC): Status: ACTIVE | Noted: 2018-06-21

## 2025-04-14 PROBLEM — I25.2 OLD NON-ST ELEVATION MYOCARDIAL INFARCTION (NSTEMI): Status: ACTIVE | Noted: 2024-10-12

## 2025-04-14 NOTE — PROGRESS NOTES
HCC coding opportunities          Chart Reviewed number of suggestions sent to Provider: 1   I11.0      Patients Insurance     Medicare Insurance: [employer]

## 2025-04-17 ENCOUNTER — APPOINTMENT (OUTPATIENT)
Dept: LAB | Facility: CLINIC | Age: 65
End: 2025-04-17
Payer: COMMERCIAL

## 2025-04-17 DIAGNOSIS — E53.8 FOLATE DEFICIENCY: ICD-10-CM

## 2025-04-17 DIAGNOSIS — E53.8 B12 DEFICIENCY: ICD-10-CM

## 2025-04-17 DIAGNOSIS — D64.9 NORMOCYTIC ANEMIA: ICD-10-CM

## 2025-04-17 LAB
BASOPHILS # BLD AUTO: 0.02 THOUSANDS/ÂΜL (ref 0–0.1)
BASOPHILS NFR BLD AUTO: 0 % (ref 0–1)
EOSINOPHIL # BLD AUTO: 0.06 THOUSAND/ÂΜL (ref 0–0.61)
EOSINOPHIL NFR BLD AUTO: 1 % (ref 0–6)
ERYTHROCYTE [DISTWIDTH] IN BLOOD BY AUTOMATED COUNT: 19.9 % (ref 11.6–15.1)
FERRITIN SERPL-MCNC: 20 NG/ML (ref 30–336)
FOLATE SERPL-MCNC: >22.3 NG/ML
HCT VFR BLD AUTO: 45.5 % (ref 36.5–49.3)
HGB BLD-MCNC: 14.3 G/DL (ref 12–17)
IMM GRANULOCYTES # BLD AUTO: 0.03 THOUSAND/UL (ref 0–0.2)
IMM GRANULOCYTES NFR BLD AUTO: 0 % (ref 0–2)
IRON SATN MFR SERPL: 20 % (ref 15–50)
IRON SERPL-MCNC: 81 UG/DL (ref 50–212)
LYMPHOCYTES # BLD AUTO: 1.59 THOUSANDS/ÂΜL (ref 0.6–4.47)
LYMPHOCYTES NFR BLD AUTO: 20 % (ref 14–44)
MCH RBC QN AUTO: 27.4 PG (ref 26.8–34.3)
MCHC RBC AUTO-ENTMCNC: 31.4 G/DL (ref 31.4–37.4)
MCV RBC AUTO: 87 FL (ref 82–98)
MONOCYTES # BLD AUTO: 0.5 THOUSAND/ÂΜL (ref 0.17–1.22)
MONOCYTES NFR BLD AUTO: 6 % (ref 4–12)
NEUTROPHILS # BLD AUTO: 5.69 THOUSANDS/ÂΜL (ref 1.85–7.62)
NEUTS SEG NFR BLD AUTO: 73 % (ref 43–75)
NRBC BLD AUTO-RTO: 0 /100 WBCS
PLATELET # BLD AUTO: 329 THOUSANDS/UL (ref 149–390)
PMV BLD AUTO: 9.8 FL (ref 8.9–12.7)
RBC # BLD AUTO: 5.22 MILLION/UL (ref 3.88–5.62)
RETICS # AUTO: NORMAL 10*3/UL (ref 14356–105094)
RETICS # CALC: 0.81 % (ref 0.37–1.87)
TIBC SERPL-MCNC: 404.6 UG/DL (ref 250–450)
TRANSFERRIN SERPL-MCNC: 289 MG/DL (ref 203–362)
UIBC SERPL-MCNC: 324 UG/DL (ref 155–355)
VIT B12 SERPL-MCNC: 507 PG/ML (ref 180–914)
WBC # BLD AUTO: 7.89 THOUSAND/UL (ref 4.31–10.16)

## 2025-04-17 PROCEDURE — 82746 ASSAY OF FOLIC ACID SERUM: CPT

## 2025-04-17 PROCEDURE — 82728 ASSAY OF FERRITIN: CPT

## 2025-04-17 PROCEDURE — 82607 VITAMIN B-12: CPT

## 2025-04-17 PROCEDURE — 85045 AUTOMATED RETICULOCYTE COUNT: CPT

## 2025-04-17 PROCEDURE — 85025 COMPLETE CBC W/AUTO DIFF WBC: CPT

## 2025-04-17 PROCEDURE — 83540 ASSAY OF IRON: CPT

## 2025-04-17 PROCEDURE — 83550 IRON BINDING TEST: CPT

## 2025-04-17 PROCEDURE — 36415 COLL VENOUS BLD VENIPUNCTURE: CPT

## 2025-04-19 ENCOUNTER — RESULTS FOLLOW-UP (OUTPATIENT)
Dept: INTERNAL MEDICINE CLINIC | Facility: CLINIC | Age: 65
End: 2025-04-19

## 2025-04-23 ENCOUNTER — OFFICE VISIT (OUTPATIENT)
Dept: INTERNAL MEDICINE CLINIC | Facility: CLINIC | Age: 65
End: 2025-04-23
Payer: COMMERCIAL

## 2025-04-23 VITALS
OXYGEN SATURATION: 98 % | WEIGHT: 170 LBS | BODY MASS INDEX: 21.14 KG/M2 | TEMPERATURE: 97.7 F | SYSTOLIC BLOOD PRESSURE: 112 MMHG | HEART RATE: 54 BPM | RESPIRATION RATE: 18 BRPM | DIASTOLIC BLOOD PRESSURE: 64 MMHG | HEIGHT: 75 IN

## 2025-04-23 DIAGNOSIS — Z00.00 MEDICARE ANNUAL WELLNESS VISIT, SUBSEQUENT: Primary | ICD-10-CM

## 2025-04-23 DIAGNOSIS — E78.00 PURE HYPERCHOLESTEROLEMIA: ICD-10-CM

## 2025-04-23 DIAGNOSIS — Z86.0100 HISTORY OF COLON POLYPS: ICD-10-CM

## 2025-04-23 DIAGNOSIS — I25.10 CORONARY ARTERY DISEASE INVOLVING NATIVE CORONARY ARTERY OF NATIVE HEART WITHOUT ANGINA PECTORIS: ICD-10-CM

## 2025-04-23 DIAGNOSIS — R79.0 LOW FERRITIN LEVEL: ICD-10-CM

## 2025-04-23 DIAGNOSIS — Z78.9 STATIN INTOLERANCE: ICD-10-CM

## 2025-04-23 PROCEDURE — G0439 PPPS, SUBSEQ VISIT: HCPCS | Performed by: INTERNAL MEDICINE

## 2025-04-23 PROCEDURE — 99214 OFFICE O/P EST MOD 30 MIN: CPT | Performed by: INTERNAL MEDICINE

## 2025-04-23 PROCEDURE — G2211 COMPLEX E/M VISIT ADD ON: HCPCS | Performed by: INTERNAL MEDICINE

## 2025-04-23 RX ORDER — EZETIMIBE 10 MG/1
10 TABLET ORAL DAILY
Qty: 90 TABLET | Refills: 1 | Status: SHIPPED | OUTPATIENT
Start: 2025-04-23 | End: 2025-04-23

## 2025-04-23 RX ORDER — EZETIMIBE 10 MG/1
10 TABLET ORAL DAILY
Qty: 90 TABLET | Refills: 1 | Status: SHIPPED | OUTPATIENT
Start: 2025-04-23

## 2025-04-23 NOTE — ASSESSMENT & PLAN NOTE
Colonoscopy completed January 2020.  3 mm polyp removed from the sigmoid colon, pathology consistent with hyperplastic polyp    5-year recall colonoscopy was recommended by GI.  Encouraged recall colonoscopy at this time especially in the setting of iron deficiency. Richardson received recall letter from GI and can schedule when he returns from vacation next month

## 2025-04-23 NOTE — ASSESSMENT & PLAN NOTE
New onset anemia with hemoglobin of 9.7 on prior blood work in January 2025.  Normocytic, normal RDW, white cells and platelets within normal limits.  Prior hemoglobin within normal limits.  Does not report any blood in the stool or urine.  He is currently taking over-the-counter iron supplement.  Repeat hemoglobin of 14.3 on recent labs.  Ferritin level of 20.  TSAT of 20%  -Recommend continuation of iron supplement for now.  Recommend follow-up with GI in regards to endoscopic evaluation.  He is due for recall colonoscopy regardless.  He received recall letter and can contact GI when he returns from vacation  Orders:  •  CBC and differential; Future  •  TIBC Panel (incl. Iron, TIBC, % Iron Saturation); Future  •  Ferritin; Future

## 2025-04-23 NOTE — PROGRESS NOTES
Name: Richardson Paez      : 1960      MRN: [id number]  Encounter Provider: Faisal Hernandez DO  Encounter Date: 2025   Encounter department: [facility]  :  Assessment & Plan  Medicare annual wellness visit, subsequent         Low ferritin level  New onset anemia with hemoglobin of 9.7 on prior blood work in 2025.  Normocytic, normal RDW, white cells and platelets within normal limits.  Prior hemoglobin within normal limits.  Does not report any blood in the stool or urine.  He is currently taking over-the-counter iron supplement.  Repeat hemoglobin of 14.3 on recent labs.  Ferritin level of 20.  TSAT of 20%  -Recommend continuation of iron supplement for now.  Recommend follow-up with GI in regards to endoscopic evaluation.  He is due for recall colonoscopy regardless.  He received recall letter and can contact GI when he returns from vacation  Orders:  •  CBC and differential; Future  •  TIBC Panel (incl. Iron, TIBC, % Iron Saturation); Future  •  Ferritin; Future    Coronary artery disease involving native coronary artery of native heart without angina pectoris  History of coronary stent placement in , now with STEMI 2024 status post SUKHI  Continue DAPT for at least 1 year  Difficulty with statin intolerance as below  Not started on beta-blocker due to resting bradycardia    -Stable.  Does not report any recent anginal symptoms  -Continue follow-up with cardiology as directed           Statin intolerance         Pure hypercholesterolemia  Bothersome myalgias with atorvastatin.  Was transition to pravastatin and ezetimibe and continues to have bothersome symptoms.  Currently taking pravastatin 20 mg in addition to ezetimibe.  -He will be going for repeat lipid panel prior to follow-up with cardiology in the summer.  Can discuss initiation of PCSK9 inhibitor      Orders:  •  ezetimibe (ZETIA) 10 mg tablet; Take 1 tablet (10 mg total) by mouth  daily    History of colon polyps  Colonoscopy completed January 2020.  3 mm polyp removed from the sigmoid colon, pathology consistent with hyperplastic polyp    5-year recall colonoscopy was recommended by GI.  Encouraged recall colonoscopy at this time especially in the setting of iron deficiency. Richardson received recall letter from GI and can schedule when he returns from vacation next month          Preventive health issues were discussed with patient, and age appropriate screening tests were ordered as noted in patient's After Visit Summary. Personalized health advice and appropriate referrals for health education or preventive services given if needed, as noted in patient's After Visit Summary.    History of Present Illness     HPI   Patient Care Team:  Faisal Hernandez DO as PCP - General (Internal Medicine)  DO Edison Wen MD    Review of Systems  Medical History Reviewed by provider this encounter:  Problems       Annual Wellness Visit Questionnaire   Richardson is here for his Subsequent Wellness visit.     Health Risk Assessment:   Patient rates overall health as very good. Patient feels that their physical health rating is same. Patient is satisfied with their life. Eyesight was rated as same. Hearing was rated as same. Patient feels that their emotional and mental health rating is same. Patients states they are never, rarely angry. Patient states they are sometimes unusually tired/fatigued. Pain experienced in the last 7 days has been some. Patient's pain rating has been 6/10. Patient states that he has experienced no weight loss or gain in last 6 months. The pain is from the statins even thiugh i am only taking them occasionally so i can still work out. If i take a full dose daily i can’t exercise.    Depression Screening:   PHQ-2 Score: 0      Fall Risk Screening:   In the past year, patient has experienced: no history of falling in past year      Home Safety:  Patient does  not have trouble with stairs inside or outside of their home. Patient has working smoke alarms and has working carbon monoxide detector. Home safety hazards include: none.     Nutrition:   Current diet is Regular, Low Cholesterol and Low Saturated Fat.     Medications:   Patient is currently taking over-the-counter supplements. OTC medications include: see medication list. Patient is able to manage medications.     Activities of Daily Living (ADLs)/Instrumental Activities of Daily Living (IADLs):   Walk and transfer into and out of bed and chair?: Yes  Dress and groom yourself?: Yes    Bathe or shower yourself?: Yes    Feed yourself? Yes  Do your laundry/housekeeping?: Yes  Manage your money, pay your bills and track your expenses?: Yes  Make your own meals?: Yes    Do your own shopping?: Yes    Previous Hospitalizations:   Any hospitalizations or ED visits within the last 12 months?: Yes    How many hospitalizations have you had in the last year?: 1-2    Advance Care Planning:   Living will: No    Durable POA for healthcare: No    Advanced directive: No      Cognitive Screening:   Provider or family/friend/caregiver concerned regarding cognition?: No    Preventive Screenings      Cardiovascular Screening:    General: Screening Not Indicated and History Lipid Disorder      Diabetes Screening:     General: Screening Current      Colorectal Cancer Screening:     General: Screening Current      Prostate Cancer Screening:    General: Screening Current      Osteoporosis Screening:    General: Screening Not Indicated      Abdominal Aortic Aneurysm (AAA) Screening:    Risk factors include: age between 65-76 yo and tobacco use        Lung Cancer Screening:     General: Screening Not Indicated      Hepatitis C Screening:    General: Screening Current    Immunizations:  - Immunizations due: Influenza, Prevnar 20 and Zoster (Shingrix)    Screening, Brief Intervention, and Referral to Treatment (SBIRT)     Screening  Typical  "number of drinks in a day: 0  Typical number of drinks in a week: 3  Interpretation: Low risk drinking behavior.    AUDIT-C Screenin) How often did you have a drink containing alcohol in the past year? 2 to 4 times a month  2) How many drinks did you have on a typical day when you were drinking in the past year? 1 to 2  3) How often did you have 6 or more drinks on one occasion in the past year? never    AUDIT-C Score: 2  Interpretation: Score 0-3 (male): Negative screen for alcohol misuse    Single Item Drug Screening:  How often have you used an illegal drug (including marijuana) or a prescription medication for non-medical reasons in the past year? less than monthly    Single Item Drug Screen Score: 1  Interpretation: POSITIVE screen for possible drug use disorder    Drug Abuse Screening Test (DAST-10):  1) Have you used drugs other than those required for medical reasons? No  2) Do you abuse more than one drug at a time? No  3) Are you always able to stop using drugs when you want to? Yes  4) Have you had \"blackouts\" or \"flashbacks\" as a result of drug use? No  5) Do you ever feel bad or guilty about your drug use? No  6) Does your spouse (or parents) ever complain about your involvement with drugs? No  7) Have you neglected your family because of your use of drugs? No  8) Have you engaged in illegal activities in order to obtain drugs? No  9) Have you ever experienced withdrawal symptoms (felt sick) when you stopped taking drugs? No  10) Have you had medical problems as a result of your drug use (e.g., memory loss, hepatitis, convulsions, bleeding, etc.)? No    DAST-10 Score: 0  Interpretation: No problems reported    Brief Intervention  Alcohol & drug use screenings were reviewed. No concerns regarding substance use disorder identified.     Social Drivers of Health     Food Insecurity: No Food Insecurity (2025)    Hunger Vital Sign    • Worried About Running Out of Food in the Last Year: Never true " "   • Ran Out of Food in the Last Year: Never true   Transportation Needs: No Transportation Needs (4/22/2025)    PRAPARE - Transportation    • Lack of Transportation (Medical): No    • Lack of Transportation (Non-Medical): No   Housing Stability: Low Risk  (4/22/2025)    Housing Stability Vital Sign    • Unable to Pay for Housing in the Last Year: No    • Number of Times Moved in the Last Year: 0    • Homeless in the Last Year: No   Utilities: Not At Risk (4/22/2025)    [facility] Utilities    • Threatened with loss of utilities: No     No results found.    Objective   /64 (BP Location: Left arm, Patient Position: Sitting, Cuff Size: Standard)   Pulse (!) 54   Temp 97.7 °F (36.5 °C) (Temporal)   Resp 18   Ht 6' 3\" (1.905 m)   Wt 77.1 kg (170 lb)   SpO2 98%   BMI 21.25 kg/m²     Physical Exam  Cardiovascular:      Rate and Rhythm: Normal rate and regular rhythm.      Heart sounds: No murmur heard.  Pulmonary:      Effort: Pulmonary effort is normal.      Breath sounds: Normal breath sounds. No wheezing or rales.         "

## 2025-04-23 NOTE — ASSESSMENT & PLAN NOTE
Bothersome myalgias with atorvastatin.  Was transition to pravastatin and ezetimibe and continues to have bothersome symptoms.  Currently taking pravastatin 20 mg in addition to ezetimibe.  -He will be going for repeat lipid panel prior to follow-up with cardiology in the summer.  Can discuss initiation of PCSK9 inhibitor      Orders:  •  ezetimibe (ZETIA) 10 mg tablet; Take 1 tablet (10 mg total) by mouth daily

## 2025-04-23 NOTE — ASSESSMENT & PLAN NOTE
History of coronary stent placement in 2012, now with STEMI October 2024 status post SUKHI  Continue DAPT for at least 1 year  Difficulty with statin intolerance as below  Not started on beta-blocker due to resting bradycardia    -Stable.  Does not report any recent anginal symptoms  -Continue follow-up with cardiology as directed

## 2025-07-01 DIAGNOSIS — I21.3 STEMI (ST ELEVATION MYOCARDIAL INFARCTION) (HCC): ICD-10-CM

## 2025-07-02 RX ORDER — LISINOPRIL 5 MG/1
5 TABLET ORAL DAILY
Qty: 90 TABLET | Refills: 1 | Status: SHIPPED | OUTPATIENT
Start: 2025-07-02

## 2025-07-21 DIAGNOSIS — I21.3 STEMI (ST ELEVATION MYOCARDIAL INFARCTION) (HCC): ICD-10-CM

## 2025-07-23 RX ORDER — LISINOPRIL 5 MG/1
5 TABLET ORAL DAILY
Qty: 90 TABLET | Refills: 1 | Status: SHIPPED | OUTPATIENT
Start: 2025-07-23

## 2025-07-24 ENCOUNTER — OFFICE VISIT (OUTPATIENT)
Dept: CARDIOLOGY CLINIC | Facility: CLINIC | Age: 65
End: 2025-07-24
Payer: COMMERCIAL

## 2025-07-24 VITALS
BODY MASS INDEX: 20.07 KG/M2 | DIASTOLIC BLOOD PRESSURE: 68 MMHG | OXYGEN SATURATION: 100 % | HEIGHT: 75 IN | SYSTOLIC BLOOD PRESSURE: 110 MMHG | WEIGHT: 161.4 LBS

## 2025-07-24 DIAGNOSIS — I10 ESSENTIAL HYPERTENSION: ICD-10-CM

## 2025-07-24 DIAGNOSIS — I25.10 CORONARY ARTERY DISEASE INVOLVING NATIVE CORONARY ARTERY OF NATIVE HEART WITHOUT ANGINA PECTORIS: Primary | ICD-10-CM

## 2025-07-24 DIAGNOSIS — E78.00 PURE HYPERCHOLESTEROLEMIA: ICD-10-CM

## 2025-07-24 LAB
ATRIAL RATE: 62 BPM
P AXIS: 64 DEGREES
PR INTERVAL: 142 MS
QRS AXIS: 14 DEGREES
QRSD INTERVAL: 102 MS
QT INTERVAL: 388 MS
QTC INTERVAL: 393 MS
T WAVE AXIS: 49 DEGREES
VENTRICULAR RATE: 62 BPM

## 2025-07-24 PROCEDURE — 93000 ELECTROCARDIOGRAM COMPLETE: CPT | Performed by: NURSE PRACTITIONER

## 2025-07-24 PROCEDURE — 99214 OFFICE O/P EST MOD 30 MIN: CPT | Performed by: NURSE PRACTITIONER

## 2025-07-24 RX ORDER — PRAVASTATIN SODIUM 20 MG
20 TABLET ORAL DAILY
Qty: 90 TABLET | Refills: 3 | Status: SHIPPED | OUTPATIENT
Start: 2025-07-24

## 2025-07-24 NOTE — PROGRESS NOTES
Cardiology Follow Up    Richardson Paez  1960  [id number]  [facility]  [address]  [place] [address] [phone]    Assessment & Plan  Coronary artery disease involving native coronary artery of native heart without angina pectoris  10/2024 STEMI   SP SUKHI to RAMUS  Denies CP  Continue on Plavix 75 mg daily, Zetia 10 mg daily, lisinopril 5 mg daily complaining of myalgia in the morning difficult to get going due to stiffness.  Decrease pravastatin from 40 mg daily to 20 mg daily, heart healthy diet.  Essential hypertension  BP well controlled on Lisinopril 5mg daily   DASH Diet   Pure hypercholesterolemia  1/27/25 TC 84, TG 74, HDL 39, LDL 30   complaining of myalgia in the morning difficult to get going due to stiffness.  Decrease pravastatin from 40 mg daily to 20 mg daily, suggested over-the-counter coenzyme Q 10 daily  Fasting lipid profile in 3 months.    Interval History:   Mr Richardson Paez presents to our office for a follow up visit. He admits to myalgia in the morning, improved after he is moving around for a while.  Richardson denies CP, shortness of breath with minimal or moderate exertion lightheadedness or dizziness.  He has lost 5 pounds since April he admits to working out 180 minutes a week.  He was traveling assisting his family with [employer].      Medical History   Primary Cardiologist Dr Edison Gibbons  CAD  10/2024 STEMI   SP SUKHI to RAMUS   ICM LVEF 48% improved to 60% by TTE on 1/29/25  Hypertension  Hypercholesterolemia 1/27/25 TC 84, TG 74, HDL 39, LDL 30   Anemia   Denies tobacco use     1/29/25 TTE LVEF 60% diastolic function normal.  Hypokinetic basal inferior lateral and mid inferolateral wall.  All other segments normal.  Right ventricular size systolic function normal.  Mild to moderate MRSarah  Problem List[1]  Past Medical History[2]  Social History     Socioeconomic History    Marital  status: /Civil Union     Spouse name: Not on file    Number of children: Not on file    Years of education: Not on file    Highest education level: Not on file   Occupational History    Not on file   Tobacco Use    Smoking status: Former     Current packs/day: 0.00     Types: Cigarettes     Quit date:      Years since quittin.5    Smokeless tobacco: Never   Vaping Use    Vaping status: Never Used   Substance and Sexual Activity    Alcohol use: Yes     Comment: social    Drug use: Never    Sexual activity: Not Currently   Other Topics Concern    Not on file   Social History Narrative    Not on file     Social Drivers of Health     Financial Resource Strain: Not on file   Food Insecurity: No Food Insecurity (2025)    Nursing - Inadequate Food Risk Classification     Worried About Running Out of Food in the Last Year: Never true     Ran Out of Food in the Last Year: Never true     Ran Out of Food in the Last Year: Not on file   Transportation Needs: No Transportation Needs (2025)    PRAPARE - Transportation     Lack of Transportation (Medical): No     Lack of Transportation (Non-Medical): No   Physical Activity: Not on file   Stress: Not on file   Social Connections: Not on file   Intimate Partner Violence: Not on file   Housing Stability: Low Risk  (2025)    Housing Stability Vital Sign     Unable to Pay for Housing in the Last Year: No     Number of Times Moved in the Last Year: 0     Homeless in the Last Year: No      Family History[3]  Past Surgical History[4]  Current Medications[5]  Allergies   Allergen Reactions    Penicillins Rash       Labs:  No visits with results within 2 Month(s) from this visit.   Latest known visit with results is:   Appointment on 2025   Component Date Value    WBC 2025 7.89     RBC 2025 5.22     Hemoglobin 2025 14.3     Hematocrit 2025 45.5     MCV 2025 87     MCH 2025 27.4     MCHC 2025 31.4     RDW  04/17/2025 19.9 (H)     MPV 04/17/2025 9.8     Platelets 04/17/2025 329     nRBC 04/17/2025 0     Segmented % 04/17/2025 73     Immature Grans % 04/17/2025 0     Lymphocytes % 04/17/2025 20     Monocytes % 04/17/2025 6     Eosinophils Relative 04/17/2025 1     Basophils Relative 04/17/2025 0     Absolute Neutrophils 04/17/2025 5.69     Absolute Immature Grans 04/17/2025 0.03     Absolute Lymphocytes 04/17/2025 1.59     Absolute Monocytes 04/17/2025 0.50     Eosinophils Absolute 04/17/2025 0.06     Basophils Absolute 04/17/2025 0.02     Ferritin 04/17/2025 20 (L)     Iron Saturation 04/17/2025 20     TIBC 04/17/2025 404.6     Iron 04/17/2025 81     Transferrin 04/17/2025 289     UIBC 04/17/2025 324     Retic Ct Abs 04/17/2025 42,300     Retic Ct Pct 04/17/2025 0.81     Vitamin B-12 04/17/2025 507     Folate 04/17/2025 >22.3      Imaging: No results found.    Review of Systems:  Review of Systems   Musculoskeletal:  Positive for arthralgias and myalgias.        Per HPI   All other systems reviewed and are negative.      Physical Exam:  Physical Exam  Vitals reviewed.   Constitutional:       Appearance: Normal appearance.     Cardiovascular:      Rate and Rhythm: Normal rate and regular rhythm.      Pulses: Normal pulses.      Heart sounds: Normal heart sounds.   Pulmonary:      Effort: Pulmonary effort is normal.      Breath sounds: Normal breath sounds.     Musculoskeletal:         General: Normal range of motion.      Cervical back: Normal range of motion and neck supple.      Right lower leg: No edema.      Left lower leg: No edema.     Skin:     General: Skin is warm and dry.      Capillary Refill: Capillary refill takes less than 2 seconds.     Neurological:      General: No focal deficit present.      Mental Status: He is alert and oriented to person, place, and time.     Psychiatric:         Mood and Affect: Mood normal.         Behavior: Behavior normal.              [1]   Patient Active Problem  List  Diagnosis    Coronary artery disease involving native coronary artery of native heart without angina pectoris    Pure hypercholesterolemia    Esophageal reflux    Allergic rhinitis    History of colon polyps    Arthritis of right ankle    Tobacco abuse    Heart failure with mid-range ejection fraction (HFmEF) (HCC)    Other specified anemias    Old non-ST elevation myocardial infarction (NSTEMI)    Hypertensive heart disease with congestive heart failure (HCC)    Low ferritin level   [2] No past medical history on file.  [3]   Family History  Problem Relation Name Age of Onset    Heart disease Mother      Heart disease Father     [4]   Past Surgical History:  Procedure Laterality Date    APPENDECTOMY      CARDIAC CATHETERIZATION N/A 10/12/2024    Procedure: Cardiac pci;  Surgeon: Rayne Miller DO;  Location: [facility];  Service: Cardiology    CARDIAC CATHETERIZATION Left 10/12/2024    Procedure: Cardiac Left Heart Cath;  Surgeon: Rayne Miller DO;  Location: [facility];  Service: Cardiology    CORONARY ANGIOPLASTY WITH STENT PLACEMENT      HERNIA REPAIR     [5]   Current Outpatient Medications:     aspirin (ECOTRIN LOW STRENGTH) 81 mg EC tablet, Take 1 tablet (81 mg total) by mouth daily, Disp: 90 tablet, Rfl: 3    clopidogrel (Plavix) 75 mg tablet, Take 1 tablet (75 mg total) by mouth daily, Disp: 90 tablet, Rfl: 3    ezetimibe (ZETIA) 10 mg tablet, Take 1 tablet (10 mg total) by mouth daily, Disp: 90 tablet, Rfl: 1    lisinopril (ZESTRIL) 5 mg tablet, Take 1 tablet (5 mg total) by mouth daily, Disp: 90 tablet, Rfl: 1    pravastatin (PRAVACHOL) 40 mg tablet, take 1 tablet by mouth once daily, Disp: 90 tablet, Rfl: 1

## 2025-07-24 NOTE — ASSESSMENT & PLAN NOTE
10/2024 STEMI   SP SUKHI to CORIN Van CP  Continue on Plavix 75 mg daily, Zetia 10 mg daily, lisinopril 5 mg daily complaining of myalgia in the morning difficult to get going due to stiffness.  Decrease pravastatin from 40 mg daily to 20 mg daily, heart healthy diet.

## 2025-07-29 LAB
ATRIAL RATE: 62 BPM
P AXIS: 64 DEGREES
PR INTERVAL: 142 MS
QRS AXIS: 14 DEGREES
QRSD INTERVAL: 102 MS
QT INTERVAL: 388 MS
QTC INTERVAL: 393 MS
T WAVE AXIS: 49 DEGREES
VENTRICULAR RATE: 62 BPM

## (undated) DEVICE — RUNTHROUGH NS EXTRA FLOPPY PTCA GUIDEWIRE: Brand: RUNTHROUGH

## (undated) DEVICE — CATH GUIDE LAUNCHER 6FR EBU 3.5

## (undated) DEVICE — CATH DIAG 5FR .045 100CM FR4

## (undated) DEVICE — GUIDEWIRE WHOLEY .035 145 CM FLOP STR TIP

## (undated) DEVICE — CATH GUIDE LAUNCHER 6FR JR 4.0

## (undated) DEVICE — RADIFOCUS OPTITORQUE ANGIOGRAPHIC CATHETER: Brand: OPTITORQUE

## (undated) DEVICE — BALLOON EUPHORA RX 2 X 15MM

## (undated) DEVICE — BALLOON NC EUPHORA 2.75 X 15MM

## (undated) DEVICE — GUIDEWIRE .035 260CM 3MM J TIP

## (undated) DEVICE — GLIDESHEATH BASIC HYDROPHILIC COATED INTRODUCER SHEATH: Brand: GLIDESHEATH